# Patient Record
Sex: FEMALE | Race: WHITE | HISPANIC OR LATINO
[De-identification: names, ages, dates, MRNs, and addresses within clinical notes are randomized per-mention and may not be internally consistent; named-entity substitution may affect disease eponyms.]

---

## 2017-05-30 ENCOUNTER — NON-APPOINTMENT (OUTPATIENT)
Age: 62
End: 2017-05-30

## 2017-05-30 ENCOUNTER — APPOINTMENT (OUTPATIENT)
Dept: CARDIOLOGY | Facility: CLINIC | Age: 62
End: 2017-05-30

## 2017-05-30 VITALS
HEIGHT: 62 IN | SYSTOLIC BLOOD PRESSURE: 96 MMHG | BODY MASS INDEX: 44.53 KG/M2 | WEIGHT: 242 LBS | OXYGEN SATURATION: 95 % | HEART RATE: 80 BPM | DIASTOLIC BLOOD PRESSURE: 64 MMHG

## 2017-05-30 VITALS — DIASTOLIC BLOOD PRESSURE: 82 MMHG | SYSTOLIC BLOOD PRESSURE: 134 MMHG

## 2017-09-08 ENCOUNTER — INPATIENT (INPATIENT)
Facility: HOSPITAL | Age: 62
LOS: 16 days | Discharge: HOME | End: 2017-09-25
Attending: PSYCHIATRY & NEUROLOGY | Admitting: PSYCHIATRY & NEUROLOGY
Payer: COMMERCIAL

## 2017-09-08 ENCOUNTER — EMERGENCY (EMERGENCY)
Facility: HOSPITAL | Age: 62
LOS: 0 days | Discharge: PSYCHIATRIC OR SUBSTANCE USE FACILITY | End: 2017-09-08
Attending: EMERGENCY MEDICINE | Admitting: EMERGENCY MEDICINE
Payer: COMMERCIAL

## 2017-09-08 VITALS
HEART RATE: 81 BPM | DIASTOLIC BLOOD PRESSURE: 51 MMHG | SYSTOLIC BLOOD PRESSURE: 124 MMHG | OXYGEN SATURATION: 95 % | RESPIRATION RATE: 19 BRPM | TEMPERATURE: 98 F

## 2017-09-08 VITALS — HEIGHT: 62 IN | RESPIRATION RATE: 20 BRPM | WEIGHT: 212.97 LBS | TEMPERATURE: 97 F

## 2017-09-08 VITALS — HEIGHT: 62 IN | WEIGHT: 235.89 LBS

## 2017-09-08 DIAGNOSIS — F31.4 BIPOLAR DISORDER, CURRENT EPISODE DEPRESSED, SEVERE, WITHOUT PSYCHOTIC FEATURES: ICD-10-CM

## 2017-09-08 DIAGNOSIS — E78.5 HYPERLIPIDEMIA, UNSPECIFIED: ICD-10-CM

## 2017-09-08 DIAGNOSIS — I47.1 SUPRAVENTRICULAR TACHYCARDIA: ICD-10-CM

## 2017-09-08 DIAGNOSIS — F32.2 MAJOR DEPRESSIVE DISORDER, SINGLE EPISODE, SEVERE WITHOUT PSYCHOTIC FEATURES: ICD-10-CM

## 2017-09-08 DIAGNOSIS — R69 ILLNESS, UNSPECIFIED: ICD-10-CM

## 2017-09-08 DIAGNOSIS — E11.9 TYPE 2 DIABETES MELLITUS WITHOUT COMPLICATIONS: ICD-10-CM

## 2017-09-08 LAB
ADD ON TEST-SPECIMEN IN LAB: SIGNIFICANT CHANGE UP
ALBUMIN SERPL ELPH-MCNC: 4.2 G/DL — SIGNIFICANT CHANGE UP (ref 3.3–5)
ALP SERPL-CCNC: 114 U/L — SIGNIFICANT CHANGE UP (ref 40–120)
ALT FLD-CCNC: 30 U/L — SIGNIFICANT CHANGE UP (ref 12–78)
AMPHET UR-MCNC: NEGATIVE — SIGNIFICANT CHANGE UP
ANION GAP SERPL CALC-SCNC: 8 MMOL/L — SIGNIFICANT CHANGE UP (ref 5–17)
APAP SERPL-MCNC: <2 UG/ML — SIGNIFICANT CHANGE UP (ref 10–30)
AST SERPL-CCNC: 14 U/L — LOW (ref 15–37)
BARBITURATES UR SCN-MCNC: NEGATIVE — SIGNIFICANT CHANGE UP
BENZODIAZ UR-MCNC: POSITIVE — SIGNIFICANT CHANGE UP
BILIRUB SERPL-MCNC: 0.6 MG/DL — SIGNIFICANT CHANGE UP (ref 0.2–1.2)
BUN SERPL-MCNC: 11 MG/DL — SIGNIFICANT CHANGE UP (ref 7–23)
CALCIUM SERPL-MCNC: 10.7 MG/DL — HIGH (ref 8.5–10.1)
CHLORIDE SERPL-SCNC: 102 MMOL/L — SIGNIFICANT CHANGE UP (ref 96–108)
CO2 SERPL-SCNC: 27 MMOL/L — SIGNIFICANT CHANGE UP (ref 22–31)
COCAINE METAB.OTHER UR-MCNC: NEGATIVE — SIGNIFICANT CHANGE UP
CREAT SERPL-MCNC: 0.84 MG/DL — SIGNIFICANT CHANGE UP (ref 0.5–1.3)
ETHANOL SERPL-MCNC: <10 MG/DL — SIGNIFICANT CHANGE UP (ref 0–10)
GLUCOSE SERPL-MCNC: 125 MG/DL — HIGH (ref 70–99)
HCT VFR BLD CALC: 45.7 % — HIGH (ref 34.5–45)
HGB BLD-MCNC: 15.7 G/DL — HIGH (ref 11.5–15.5)
MCHC RBC-ENTMCNC: 29.7 PG — SIGNIFICANT CHANGE UP (ref 27–34)
MCHC RBC-ENTMCNC: 34.4 GM/DL — SIGNIFICANT CHANGE UP (ref 32–36)
MCV RBC AUTO: 86.4 FL — SIGNIFICANT CHANGE UP (ref 80–100)
METHADONE UR-MCNC: NEGATIVE — SIGNIFICANT CHANGE UP
OPIATES UR-MCNC: NEGATIVE — SIGNIFICANT CHANGE UP
PCP SPEC-MCNC: SIGNIFICANT CHANGE UP
PCP UR-MCNC: NEGATIVE — SIGNIFICANT CHANGE UP
PLATELET # BLD AUTO: 302 K/UL — SIGNIFICANT CHANGE UP (ref 150–400)
POTASSIUM SERPL-MCNC: 4 MMOL/L — SIGNIFICANT CHANGE UP (ref 3.5–5.3)
POTASSIUM SERPL-SCNC: 4 MMOL/L — SIGNIFICANT CHANGE UP (ref 3.5–5.3)
PROT SERPL-MCNC: 7.8 GM/DL — SIGNIFICANT CHANGE UP (ref 6–8.3)
RBC # BLD: 5.28 M/UL — HIGH (ref 3.8–5.2)
RBC # FLD: 12.5 % — SIGNIFICANT CHANGE UP (ref 10.3–14.5)
SALICYLATES SERPL-MCNC: <1.7 MG/DL — LOW (ref 2.8–20)
SODIUM SERPL-SCNC: 137 MMOL/L — SIGNIFICANT CHANGE UP (ref 135–145)
THC UR QL: NEGATIVE — SIGNIFICANT CHANGE UP
WBC # BLD: 8.2 K/UL — SIGNIFICANT CHANGE UP (ref 3.8–10.5)
WBC # FLD AUTO: 8.2 K/UL — SIGNIFICANT CHANGE UP (ref 3.8–10.5)

## 2017-09-08 PROCEDURE — 99223 1ST HOSP IP/OBS HIGH 75: CPT

## 2017-09-08 PROCEDURE — 99285 EMERGENCY DEPT VISIT HI MDM: CPT

## 2017-09-08 PROCEDURE — 93010 ELECTROCARDIOGRAM REPORT: CPT

## 2017-09-08 RX ORDER — ATENOLOL 25 MG/1
25 TABLET ORAL DAILY
Qty: 0 | Refills: 0 | Status: DISCONTINUED | OUTPATIENT
Start: 2017-09-08 | End: 2017-09-25

## 2017-09-08 RX ORDER — MIRTAZAPINE 45 MG/1
7.5 TABLET, ORALLY DISINTEGRATING ORAL AT BEDTIME
Qty: 0 | Refills: 0 | Status: DISCONTINUED | OUTPATIENT
Start: 2017-09-08 | End: 2017-09-11

## 2017-09-08 RX ORDER — PREGABALIN 225 MG/1
500 CAPSULE ORAL DAILY
Qty: 0 | Refills: 0 | Status: DISCONTINUED | OUTPATIENT
Start: 2017-09-08 | End: 2017-09-25

## 2017-09-08 RX ORDER — ATENOLOL 25 MG/1
25 TABLET ORAL AT BEDTIME
Qty: 0 | Refills: 0 | Status: DISCONTINUED | OUTPATIENT
Start: 2017-09-08 | End: 2017-09-08

## 2017-09-08 RX ORDER — HALOPERIDOL DECANOATE 100 MG/ML
5 INJECTION INTRAMUSCULAR EVERY 6 HOURS
Qty: 0 | Refills: 0 | Status: DISCONTINUED | OUTPATIENT
Start: 2017-09-08 | End: 2017-09-25

## 2017-09-08 RX ORDER — DIPHENHYDRAMINE HCL 50 MG
50 CAPSULE ORAL EVERY 6 HOURS
Qty: 0 | Refills: 0 | Status: DISCONTINUED | OUTPATIENT
Start: 2017-09-08 | End: 2017-09-25

## 2017-09-08 RX ORDER — HYDROXYZINE HCL 10 MG
50 TABLET ORAL EVERY 6 HOURS
Qty: 0 | Refills: 0 | Status: DISCONTINUED | OUTPATIENT
Start: 2017-09-08 | End: 2017-09-25

## 2017-09-08 RX ORDER — DULOXETINE HYDROCHLORIDE 30 MG/1
60 CAPSULE, DELAYED RELEASE ORAL AT BEDTIME
Qty: 0 | Refills: 0 | Status: DISCONTINUED | OUTPATIENT
Start: 2017-09-08 | End: 2017-09-11

## 2017-09-08 RX ORDER — METFORMIN HYDROCHLORIDE 850 MG/1
500 TABLET ORAL
Qty: 0 | Refills: 0 | Status: DISCONTINUED | OUTPATIENT
Start: 2017-09-08 | End: 2017-09-25

## 2017-09-08 RX ORDER — LAMOTRIGINE 25 MG/1
200 TABLET, ORALLY DISINTEGRATING ORAL AT BEDTIME
Qty: 0 | Refills: 0 | Status: DISCONTINUED | OUTPATIENT
Start: 2017-09-08 | End: 2017-09-25

## 2017-09-08 RX ORDER — ALPRAZOLAM 0.25 MG
0.25 TABLET ORAL AT BEDTIME
Qty: 0 | Refills: 0 | Status: DISCONTINUED | OUTPATIENT
Start: 2017-09-08 | End: 2017-09-11

## 2017-09-08 RX ORDER — ATORVASTATIN CALCIUM 80 MG/1
10 TABLET, FILM COATED ORAL AT BEDTIME
Qty: 0 | Refills: 0 | Status: DISCONTINUED | OUTPATIENT
Start: 2017-09-08 | End: 2017-09-25

## 2017-09-08 RX ADMIN — DULOXETINE HYDROCHLORIDE 60 MILLIGRAM(S): 30 CAPSULE, DELAYED RELEASE ORAL at 21:58

## 2017-09-08 RX ADMIN — LAMOTRIGINE 200 MILLIGRAM(S): 25 TABLET, ORALLY DISINTEGRATING ORAL at 21:58

## 2017-09-08 RX ADMIN — MIRTAZAPINE 7.5 MILLIGRAM(S): 45 TABLET, ORALLY DISINTEGRATING ORAL at 21:58

## 2017-09-08 RX ADMIN — METFORMIN HYDROCHLORIDE 500 MILLIGRAM(S): 850 TABLET ORAL at 21:58

## 2017-09-08 RX ADMIN — ATORVASTATIN CALCIUM 10 MILLIGRAM(S): 80 TABLET, FILM COATED ORAL at 21:58

## 2017-09-08 RX ADMIN — Medication 30 MILLIGRAM(S): at 21:58

## 2017-09-08 NOTE — ED PROVIDER NOTE - PSH
Benign Tumor of Breast  right  Fallen Bladder  Bladder Sling  Hernia  Right Inguinal  repair  History of Arthroscopy of Knee  Right  S/P Dilation and Curettage

## 2017-09-08 NOTE — ED ADULT TRIAGE NOTE - CHIEF COMPLAINT QUOTE
Patient comes to ED for SI and paranoia. Pt states she has  been having fears pf work, computers and going to store. Pt states she took xanax last night and this morning. pt states her fear was bad thru the night when she had thoughts of taking 45 xanax to harm herself. Pt was sent to psychiatrist office.

## 2017-09-08 NOTE — ED BEHAVIORAL HEALTH ASSESSMENT NOTE - HPI (INCLUDE ILLNESS QUALITY, SEVERITY, DURATION, TIMING, CONTEXT, MODIFYING FACTORS, ASSOCIATED SIGNS AND SYMPTOMS)
61 yodwf, employed as  for [employer], , lives alone in [place], [facility] Bipolar disorder, Catatonia, past h/o raul, one prior psych admission at  2013 for Catatonia, no h/o SA or SI, no h/o cutting, violence, subsdtance use/abuse, PMH Mitral Regurgitation, SVT, Palpitations, PRESTON, NIDDM, HLD, recent abnormal parathyroid, bib sister from employers parking lot after calling in sick, unable to go into work, due to overwhelming anxiety and fear.    Pt reports increasing depression over past 4 weeks, in treatment at [facility] with Dr Lizette Arredondo, who has been adjusting meds and changed to bedtime.  Current c/o s include severe depression, impaired sleep, using Xanax to sleep, cannot stay asleep, last night took 2, 0.25 mg to fall asleep and 3 more over night.  Recent h/o SI, last night had thoughts of taking entire bottle of Xanax 60 tabs dispensed yesterday,, 45 elsie,  but stopped due to fear of consequence of failed attempt, "being a vegetable", admits to past sporadic SI, thoughts of driving into a wall, but denies prior attempts or SI being part of prior depression.  Pt has h/o raul but never admitted to hospital for raul, with h/o $5000 debt, recently paid off.  Last psych admission at  for Catatonia, when diagnosed Bipolar.  Pt had been in treatment for approx 10 yrs prior to  admission, with  Dr Flores for Depressive Disorder.  Pt reports weight loss, and fear irrational fear of leaving home, or  to get food, occasionally can go to drive through.  Pt  went for DNA testing this past Tues ordered by Psychiatrist due to refractory depression and prior failed meds.  Pt had been on Geodon in past with side effect diaphoresis, and Seroquel caused "addiction", she would obsess about taking it due to sedation and desire to sleep. 61 yodwf, employed as  for [employer], , lives alone in [place], [facility] Bipolar disorder, Catatonia, past h/o raul, one prior psych admission at  2013 for Catatonia, no h/o SA or SI, no h/o cutting, violence, subsdtance use/abuse, PMH Mitral Regurgitation, SVT, Palpitations, PRESTON, NIDDM, HLD, recent abnormal parathyroid, bib sister from employers parking lot after calling in sick, unable to go into work, due to overwhelming anxiety and fear.    Pt reports increasing depression over past 4 weeks, in treatment at [facility] with Dr Lizette Arredondo, who has been adjusting meds and changed to bedtime.  Current c/o s include severe depression, impaired sleep, using Xanax to sleep, cannot stay asleep, last night took 2, 0.25 mg to fall asleep and 3 more over night.  Recent h/o SI, last night had thoughts of taking entire bottle of Xanax 60 tabs dispensed yesterday,, 45 elsie,  but stopped due to fear of consequence of failed attempt, "being a vegetable", admits to past sporadic SI, thoughts of driving into a wall, but denies prior attempts or SI being part of prior depression.  Pt has h/o raul but never admitted to hospital for raul, with h/o $5000 debt, recently paid off.  Last psych admission at  for Catatonia, when diagnosed Bipolar.  Pt had been in treatment for approx 10 yrs prior to  admission, with  Dr Flores for Depressive Disorder.  Pt reports weight loss, and  irrational fear of leaving home, or  to get food, occasionally can go to drive through.  Pt  went for DNA testing this past Tues ordered by Psychiatrist due to refractory depression and prior failed meds.  Pt had been on Geodon in past with side effect of  diaphoresis, and Seroquel caused "addiction", she would obsess about taking it due to sedation and desire to sleep.

## 2017-09-08 NOTE — ED BEHAVIORAL HEALTH ASSESSMENT NOTE - SUMMARY
61 yodwf, PPH Depression, Bipolar,  Catatonia and Claire with one prior psych admission for Catatonia in 2013,  no h/o SA, presents to ED with severe depression, SI, with pan and questionably intent, insomnia weight loss  debilitating anxiety with impaired functioning.  Pt is a potential danger to self and requires in Pt psych admission for mood stabilization, adjustment of meds and safety.  Case reviewed with Dr Patton.  No beds available on 5N today and none at [facility].  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  Will discuss with Pt regarding [facility] vs [place] per abed availability.  Pt wants to say in sytem

## 2017-09-08 NOTE — ED BEHAVIORAL HEALTH ASSESSMENT NOTE - CURRENT MEDICATION
Cymbalta 60 mg qd, Lamictal 100 mg bid, Buspar 30 mg bid, Xanax 0.25 mg bid prn (uses for sleep)  Metformin 500mg qd, Cardizem and Atenolol for SVT, dose unk, B12, [place] col for HLD.

## 2017-09-08 NOTE — ED ADULT NURSE REASSESSMENT NOTE - NS ED NURSE REASSESS COMMENT FT1
Peter Torres NP is at the bedside evaluating patient. Will continue to monitor for further evaluation and treatment

## 2017-09-08 NOTE — ED BEHAVIORAL HEALTH ASSESSMENT NOTE - OTHER PAST PSYCHIATRIC HISTORY (INCLUDE DETAILS REGARDING ONSET, COURSE OF ILLNESS, INPATIENT/OUTPATIENT TREATMENT)
one prior psych admission [facility] 2013 Catatonia  h/o Claire, not hospitalized.  Current Psych MS Dr Lizette Arredondo [facility] Guidance  Past MD Dr Flores  no h/o self harm

## 2017-09-08 NOTE — ED BEHAVIORAL HEALTH ASSESSMENT NOTE - SUICIDE RISK FACTORS
Global insomnia/Anhedonia/Hopelessness/Mood episode/Agitation/severe anxiety/Access to means (pills, firearms, etc.)

## 2017-09-08 NOTE — ED PROVIDER NOTE - PMH
Anxiety    Hypercholesteremia    IBS (Irritable Bowel Syndrome)    Mild Depression    Mitral Valve Prolapse    PRESTON (Obstructive Sleep Apnea)  Diagnosed, no Machine  Sustained Supraventricular Tachycardia

## 2017-09-08 NOTE — ED BEHAVIORAL HEALTH ASSESSMENT NOTE - PATIENT'S CHIEF COMPLAINT
"I am not myself. ..I had thoughts of taking the whole bottle of Xanax and of driving my car into a wall".

## 2017-09-08 NOTE — ED PROVIDER NOTE - OBJECTIVE STATEMENT
62 y/o F with a PMHx of bipolar disorder, SVT on Cardizem, DM, HLD presents to the ED c/o worsening anxiety, depression with SI. Pt states that she takes Xanax at night to help her sleep and thought about taking x45 Xanax last night. Pt states that she has an empty feeling inside, not experiencing any pain, currently calm, able to provide adequate hx and denies any other acute c/o at this time. PMD Bob.

## 2017-09-08 NOTE — ED BEHAVIORAL HEALTH ASSESSMENT NOTE - RISK ASSESSMENT
Pt is mod risk of self harm due to new onset SI with plan and pos intent, h/o severe depression with Catatonia and prolonged depression due to refractory depression on meds.

## 2017-09-08 NOTE — ED ADULT NURSE NOTE - OBJECTIVE STATEMENT
60 y/o F presents to the ED c/o SI and paranoia. Pt states she has lost interest in things and just "sits in front of the TV." She also states she is fearful of going to work, her computer and going to the grocery store. Pt takes xanax for anxiety and states she had a plan to take "45 pills but decided not to."

## 2017-09-08 NOTE — ED BEHAVIORAL HEALTH ASSESSMENT NOTE - DESCRIPTION
Mood Depressed, anxious affect flat, delayed slowed speech, endorsed SI with plans to OD or drive car into wall.  Intent unclear, fears becoming "vegetable", no evidence of a thought disorder, denies AH/VH/OH/Delusions.  Oriented fully. NIDDM, HLD, SVT, PRESTON, Mitral valve regurg, Palpitaion, parathyroid divorded x 2, 2 adult children with first , currently dating 2nd , lives alone, works full time for [employer] as  and worked 30 yrs at [email or web address]

## 2017-09-09 LAB
ALBUMIN SERPL ELPH-MCNC: 4.7 G/DL — SIGNIFICANT CHANGE UP (ref 3.3–5)
ALP SERPL-CCNC: 107 U/L — SIGNIFICANT CHANGE UP (ref 40–120)
ALT FLD-CCNC: 22 U/L — SIGNIFICANT CHANGE UP (ref 4–33)
AMPHET UR-MCNC: NEGATIVE — SIGNIFICANT CHANGE UP
APPEARANCE UR: SIGNIFICANT CHANGE UP
AST SERPL-CCNC: 14 U/L — SIGNIFICANT CHANGE UP (ref 4–32)
BARBITURATES UR SCN-MCNC: NEGATIVE — SIGNIFICANT CHANGE UP
BASOPHILS # BLD AUTO: 0.04 K/UL — SIGNIFICANT CHANGE UP (ref 0–0.2)
BASOPHILS NFR BLD AUTO: 0.5 % — SIGNIFICANT CHANGE UP (ref 0–2)
BENZODIAZ UR-MCNC: NEGATIVE — SIGNIFICANT CHANGE UP
BILIRUB SERPL-MCNC: 0.6 MG/DL — SIGNIFICANT CHANGE UP (ref 0.2–1.2)
BILIRUB UR-MCNC: NEGATIVE — SIGNIFICANT CHANGE UP
BLOOD UR QL VISUAL: NEGATIVE — SIGNIFICANT CHANGE UP
BUN SERPL-MCNC: 12 MG/DL — SIGNIFICANT CHANGE UP (ref 7–23)
CALCIUM SERPL-MCNC: 10.8 MG/DL — HIGH (ref 8.4–10.5)
CANNABINOIDS UR-MCNC: NEGATIVE — SIGNIFICANT CHANGE UP
CHLORIDE SERPL-SCNC: 97 MMOL/L — LOW (ref 98–107)
CHOLEST SERPL-MCNC: 194 MG/DL — SIGNIFICANT CHANGE UP (ref 120–199)
CO2 SERPL-SCNC: 28 MMOL/L — SIGNIFICANT CHANGE UP (ref 22–31)
COCAINE METAB.OTHER UR-MCNC: NEGATIVE — SIGNIFICANT CHANGE UP
COD CRY URNS QL: SIGNIFICANT CHANGE UP (ref 0–0)
COLOR SPEC: YELLOW — SIGNIFICANT CHANGE UP
CREAT SERPL-MCNC: 1.06 MG/DL — SIGNIFICANT CHANGE UP (ref 0.5–1.3)
EOSINOPHIL # BLD AUTO: 0.12 K/UL — SIGNIFICANT CHANGE UP (ref 0–0.5)
EOSINOPHIL NFR BLD AUTO: 1.6 % — SIGNIFICANT CHANGE UP (ref 0–6)
GLUCOSE SERPL-MCNC: 242 MG/DL — HIGH (ref 70–99)
GLUCOSE UR-MCNC: NEGATIVE — SIGNIFICANT CHANGE UP
HBA1C BLD-MCNC: 6.8 % — HIGH (ref 4–5.6)
HCT VFR BLD CALC: 48.6 % — HIGH (ref 34.5–45)
HDLC SERPL-MCNC: 55 MG/DL — SIGNIFICANT CHANGE UP (ref 45–65)
HGB BLD-MCNC: 15.4 G/DL — SIGNIFICANT CHANGE UP (ref 11.5–15.5)
IMM GRANULOCYTES # BLD AUTO: 0.02 # — SIGNIFICANT CHANGE UP
IMM GRANULOCYTES NFR BLD AUTO: 0.3 % — SIGNIFICANT CHANGE UP (ref 0–1.5)
KETONES UR-MCNC: NEGATIVE — SIGNIFICANT CHANGE UP
LEUKOCYTE ESTERASE UR-ACNC: HIGH
LIPID PNL WITH DIRECT LDL SERPL: 127 MG/DL — SIGNIFICANT CHANGE UP
LYMPHOCYTES # BLD AUTO: 2.11 K/UL — SIGNIFICANT CHANGE UP (ref 1–3.3)
LYMPHOCYTES # BLD AUTO: 28.6 % — SIGNIFICANT CHANGE UP (ref 13–44)
MCHC RBC-ENTMCNC: 28.5 PG — SIGNIFICANT CHANGE UP (ref 27–34)
MCHC RBC-ENTMCNC: 31.7 % — LOW (ref 32–36)
MCV RBC AUTO: 90 FL — SIGNIFICANT CHANGE UP (ref 80–100)
METHADONE UR-MCNC: NEGATIVE — SIGNIFICANT CHANGE UP
MONOCYTES # BLD AUTO: 0.49 K/UL — SIGNIFICANT CHANGE UP (ref 0–0.9)
MONOCYTES NFR BLD AUTO: 6.6 % — SIGNIFICANT CHANGE UP (ref 2–14)
MUCOUS THREADS # UR AUTO: SIGNIFICANT CHANGE UP
NEUTROPHILS # BLD AUTO: 4.61 K/UL — SIGNIFICANT CHANGE UP (ref 1.8–7.4)
NEUTROPHILS NFR BLD AUTO: 62.4 % — SIGNIFICANT CHANGE UP (ref 43–77)
NITRITE UR-MCNC: NEGATIVE — SIGNIFICANT CHANGE UP
NRBC # FLD: 0 — SIGNIFICANT CHANGE UP
OPIATES UR-MCNC: NEGATIVE — SIGNIFICANT CHANGE UP
OXYCODONE UR-MCNC: NEGATIVE — SIGNIFICANT CHANGE UP
PCP UR-MCNC: NEGATIVE — SIGNIFICANT CHANGE UP
PH UR: 6 — SIGNIFICANT CHANGE UP (ref 4.6–8)
PLATELET # BLD AUTO: 309 K/UL — SIGNIFICANT CHANGE UP (ref 150–400)
PMV BLD: 11 FL — SIGNIFICANT CHANGE UP (ref 7–13)
POTASSIUM SERPL-MCNC: 4.7 MMOL/L — SIGNIFICANT CHANGE UP (ref 3.5–5.3)
POTASSIUM SERPL-SCNC: 4.7 MMOL/L — SIGNIFICANT CHANGE UP (ref 3.5–5.3)
PROT SERPL-MCNC: 7.8 G/DL — SIGNIFICANT CHANGE UP (ref 6–8.3)
PROT UR-MCNC: 20 — SIGNIFICANT CHANGE UP
RBC # BLD: 5.4 M/UL — HIGH (ref 3.8–5.2)
RBC # FLD: 13.1 % — SIGNIFICANT CHANGE UP (ref 10.3–14.5)
RBC CASTS # UR COMP ASSIST: SIGNIFICANT CHANGE UP (ref 0–?)
SODIUM SERPL-SCNC: 141 MMOL/L — SIGNIFICANT CHANGE UP (ref 135–145)
SP GR SPEC: 1.02 — SIGNIFICANT CHANGE UP (ref 1–1.03)
SQUAMOUS # UR AUTO: SIGNIFICANT CHANGE UP
TRIGL SERPL-MCNC: 155 MG/DL — HIGH (ref 10–149)
TSH SERPL-MCNC: 3.38 UIU/ML — SIGNIFICANT CHANGE UP (ref 0.27–4.2)
UROBILINOGEN FLD QL: NORMAL E.U. — SIGNIFICANT CHANGE UP (ref 0.1–0.2)
WBC # BLD: 7.39 K/UL — SIGNIFICANT CHANGE UP (ref 3.8–10.5)
WBC # FLD AUTO: 7.39 K/UL — SIGNIFICANT CHANGE UP (ref 3.8–10.5)
WBC UR QL: HIGH (ref 0–?)

## 2017-09-09 RX ADMIN — ATORVASTATIN CALCIUM 10 MILLIGRAM(S): 80 TABLET, FILM COATED ORAL at 21:00

## 2017-09-09 RX ADMIN — METFORMIN HYDROCHLORIDE 500 MILLIGRAM(S): 850 TABLET ORAL at 16:46

## 2017-09-09 RX ADMIN — LAMOTRIGINE 200 MILLIGRAM(S): 25 TABLET, ORALLY DISINTEGRATING ORAL at 21:00

## 2017-09-09 RX ADMIN — ATENOLOL 25 MILLIGRAM(S): 25 TABLET ORAL at 08:45

## 2017-09-09 RX ADMIN — PREGABALIN 500 MICROGRAM(S): 225 CAPSULE ORAL at 08:45

## 2017-09-09 RX ADMIN — Medication 30 MILLIGRAM(S): at 21:00

## 2017-09-09 RX ADMIN — MIRTAZAPINE 7.5 MILLIGRAM(S): 45 TABLET, ORALLY DISINTEGRATING ORAL at 21:00

## 2017-09-09 RX ADMIN — DULOXETINE HYDROCHLORIDE 60 MILLIGRAM(S): 30 CAPSULE, DELAYED RELEASE ORAL at 21:00

## 2017-09-10 RX ADMIN — ATENOLOL 25 MILLIGRAM(S): 25 TABLET ORAL at 08:49

## 2017-09-10 RX ADMIN — Medication 30 MILLIGRAM(S): at 21:01

## 2017-09-10 RX ADMIN — LAMOTRIGINE 200 MILLIGRAM(S): 25 TABLET, ORALLY DISINTEGRATING ORAL at 21:01

## 2017-09-10 RX ADMIN — ATORVASTATIN CALCIUM 10 MILLIGRAM(S): 80 TABLET, FILM COATED ORAL at 21:01

## 2017-09-10 RX ADMIN — METFORMIN HYDROCHLORIDE 500 MILLIGRAM(S): 850 TABLET ORAL at 16:48

## 2017-09-10 RX ADMIN — PREGABALIN 500 MICROGRAM(S): 225 CAPSULE ORAL at 08:50

## 2017-09-10 RX ADMIN — MIRTAZAPINE 7.5 MILLIGRAM(S): 45 TABLET, ORALLY DISINTEGRATING ORAL at 21:01

## 2017-09-10 RX ADMIN — DULOXETINE HYDROCHLORIDE 60 MILLIGRAM(S): 30 CAPSULE, DELAYED RELEASE ORAL at 21:01

## 2017-09-11 PROCEDURE — 99232 SBSQ HOSP IP/OBS MODERATE 35: CPT | Mod: GC

## 2017-09-11 RX ORDER — GABAPENTIN 400 MG/1
100 CAPSULE ORAL THREE TIMES A DAY
Qty: 0 | Refills: 0 | Status: DISCONTINUED | OUTPATIENT
Start: 2017-09-11 | End: 2017-09-18

## 2017-09-11 RX ORDER — DULOXETINE HYDROCHLORIDE 30 MG/1
40 CAPSULE, DELAYED RELEASE ORAL DAILY
Qty: 0 | Refills: 0 | Status: DISCONTINUED | OUTPATIENT
Start: 2017-09-11 | End: 2017-09-14

## 2017-09-11 RX ORDER — MIRTAZAPINE 45 MG/1
15 TABLET, ORALLY DISINTEGRATING ORAL AT BEDTIME
Qty: 0 | Refills: 0 | Status: DISCONTINUED | OUTPATIENT
Start: 2017-09-11 | End: 2017-09-14

## 2017-09-11 RX ADMIN — ATENOLOL 25 MILLIGRAM(S): 25 TABLET ORAL at 08:30

## 2017-09-11 RX ADMIN — GABAPENTIN 100 MILLIGRAM(S): 400 CAPSULE ORAL at 21:06

## 2017-09-11 RX ADMIN — MIRTAZAPINE 15 MILLIGRAM(S): 45 TABLET, ORALLY DISINTEGRATING ORAL at 21:07

## 2017-09-11 RX ADMIN — LAMOTRIGINE 200 MILLIGRAM(S): 25 TABLET, ORALLY DISINTEGRATING ORAL at 21:07

## 2017-09-11 RX ADMIN — METFORMIN HYDROCHLORIDE 500 MILLIGRAM(S): 850 TABLET ORAL at 16:14

## 2017-09-11 RX ADMIN — Medication 30 MILLIGRAM(S): at 21:06

## 2017-09-11 RX ADMIN — ATORVASTATIN CALCIUM 10 MILLIGRAM(S): 80 TABLET, FILM COATED ORAL at 21:06

## 2017-09-11 RX ADMIN — PREGABALIN 500 MICROGRAM(S): 225 CAPSULE ORAL at 08:30

## 2017-09-12 PROCEDURE — 99232 SBSQ HOSP IP/OBS MODERATE 35: CPT | Mod: GC

## 2017-09-12 RX ADMIN — GABAPENTIN 100 MILLIGRAM(S): 400 CAPSULE ORAL at 08:06

## 2017-09-12 RX ADMIN — GABAPENTIN 100 MILLIGRAM(S): 400 CAPSULE ORAL at 14:10

## 2017-09-12 RX ADMIN — DULOXETINE HYDROCHLORIDE 40 MILLIGRAM(S): 30 CAPSULE, DELAYED RELEASE ORAL at 08:06

## 2017-09-12 RX ADMIN — METFORMIN HYDROCHLORIDE 500 MILLIGRAM(S): 850 TABLET ORAL at 17:06

## 2017-09-12 RX ADMIN — MIRTAZAPINE 15 MILLIGRAM(S): 45 TABLET, ORALLY DISINTEGRATING ORAL at 20:56

## 2017-09-12 RX ADMIN — PREGABALIN 500 MICROGRAM(S): 225 CAPSULE ORAL at 08:06

## 2017-09-12 RX ADMIN — LAMOTRIGINE 200 MILLIGRAM(S): 25 TABLET, ORALLY DISINTEGRATING ORAL at 20:55

## 2017-09-12 RX ADMIN — ATORVASTATIN CALCIUM 10 MILLIGRAM(S): 80 TABLET, FILM COATED ORAL at 20:56

## 2017-09-12 RX ADMIN — ATENOLOL 25 MILLIGRAM(S): 25 TABLET ORAL at 08:06

## 2017-09-12 RX ADMIN — GABAPENTIN 100 MILLIGRAM(S): 400 CAPSULE ORAL at 20:55

## 2017-09-12 RX ADMIN — Medication 15 MILLIGRAM(S): at 20:56

## 2017-09-13 PROCEDURE — 90853 GROUP PSYCHOTHERAPY: CPT

## 2017-09-13 PROCEDURE — 99232 SBSQ HOSP IP/OBS MODERATE 35: CPT | Mod: 25,GC

## 2017-09-13 RX ADMIN — ATORVASTATIN CALCIUM 10 MILLIGRAM(S): 80 TABLET, FILM COATED ORAL at 20:34

## 2017-09-13 RX ADMIN — GABAPENTIN 100 MILLIGRAM(S): 400 CAPSULE ORAL at 20:34

## 2017-09-13 RX ADMIN — GABAPENTIN 100 MILLIGRAM(S): 400 CAPSULE ORAL at 08:46

## 2017-09-13 RX ADMIN — LAMOTRIGINE 200 MILLIGRAM(S): 25 TABLET, ORALLY DISINTEGRATING ORAL at 20:34

## 2017-09-13 RX ADMIN — ATENOLOL 25 MILLIGRAM(S): 25 TABLET ORAL at 08:46

## 2017-09-13 RX ADMIN — PREGABALIN 500 MICROGRAM(S): 225 CAPSULE ORAL at 08:46

## 2017-09-13 RX ADMIN — Medication 15 MILLIGRAM(S): at 20:34

## 2017-09-13 RX ADMIN — METFORMIN HYDROCHLORIDE 500 MILLIGRAM(S): 850 TABLET ORAL at 17:02

## 2017-09-13 RX ADMIN — MIRTAZAPINE 15 MILLIGRAM(S): 45 TABLET, ORALLY DISINTEGRATING ORAL at 20:34

## 2017-09-13 RX ADMIN — DULOXETINE HYDROCHLORIDE 40 MILLIGRAM(S): 30 CAPSULE, DELAYED RELEASE ORAL at 08:46

## 2017-09-13 RX ADMIN — GABAPENTIN 100 MILLIGRAM(S): 400 CAPSULE ORAL at 12:46

## 2017-09-14 PROCEDURE — 99232 SBSQ HOSP IP/OBS MODERATE 35: CPT | Mod: GC

## 2017-09-14 RX ORDER — DULOXETINE HYDROCHLORIDE 30 MG/1
20 CAPSULE, DELAYED RELEASE ORAL DAILY
Qty: 0 | Refills: 0 | Status: DISCONTINUED | OUTPATIENT
Start: 2017-09-14 | End: 2017-09-19

## 2017-09-14 RX ORDER — MIRTAZAPINE 45 MG/1
30 TABLET, ORALLY DISINTEGRATING ORAL AT BEDTIME
Qty: 0 | Refills: 0 | Status: DISCONTINUED | OUTPATIENT
Start: 2017-09-14 | End: 2017-09-21

## 2017-09-14 RX ADMIN — GABAPENTIN 100 MILLIGRAM(S): 400 CAPSULE ORAL at 20:42

## 2017-09-14 RX ADMIN — MIRTAZAPINE 30 MILLIGRAM(S): 45 TABLET, ORALLY DISINTEGRATING ORAL at 20:56

## 2017-09-14 RX ADMIN — LAMOTRIGINE 200 MILLIGRAM(S): 25 TABLET, ORALLY DISINTEGRATING ORAL at 20:42

## 2017-09-14 RX ADMIN — PREGABALIN 500 MICROGRAM(S): 225 CAPSULE ORAL at 09:01

## 2017-09-14 RX ADMIN — ATENOLOL 25 MILLIGRAM(S): 25 TABLET ORAL at 09:01

## 2017-09-14 RX ADMIN — METFORMIN HYDROCHLORIDE 500 MILLIGRAM(S): 850 TABLET ORAL at 17:32

## 2017-09-14 RX ADMIN — GABAPENTIN 100 MILLIGRAM(S): 400 CAPSULE ORAL at 13:58

## 2017-09-14 RX ADMIN — DULOXETINE HYDROCHLORIDE 40 MILLIGRAM(S): 30 CAPSULE, DELAYED RELEASE ORAL at 09:01

## 2017-09-14 RX ADMIN — Medication 15 MILLIGRAM(S): at 20:42

## 2017-09-14 RX ADMIN — ATORVASTATIN CALCIUM 10 MILLIGRAM(S): 80 TABLET, FILM COATED ORAL at 20:42

## 2017-09-14 RX ADMIN — GABAPENTIN 100 MILLIGRAM(S): 400 CAPSULE ORAL at 09:01

## 2017-09-15 PROCEDURE — 99232 SBSQ HOSP IP/OBS MODERATE 35: CPT | Mod: GC

## 2017-09-15 RX ADMIN — GABAPENTIN 100 MILLIGRAM(S): 400 CAPSULE ORAL at 08:50

## 2017-09-15 RX ADMIN — ATENOLOL 25 MILLIGRAM(S): 25 TABLET ORAL at 08:50

## 2017-09-15 RX ADMIN — DULOXETINE HYDROCHLORIDE 20 MILLIGRAM(S): 30 CAPSULE, DELAYED RELEASE ORAL at 08:50

## 2017-09-15 RX ADMIN — METFORMIN HYDROCHLORIDE 500 MILLIGRAM(S): 850 TABLET ORAL at 17:42

## 2017-09-15 RX ADMIN — GABAPENTIN 100 MILLIGRAM(S): 400 CAPSULE ORAL at 13:49

## 2017-09-15 RX ADMIN — MIRTAZAPINE 30 MILLIGRAM(S): 45 TABLET, ORALLY DISINTEGRATING ORAL at 21:17

## 2017-09-15 RX ADMIN — LAMOTRIGINE 200 MILLIGRAM(S): 25 TABLET, ORALLY DISINTEGRATING ORAL at 21:17

## 2017-09-15 RX ADMIN — GABAPENTIN 100 MILLIGRAM(S): 400 CAPSULE ORAL at 21:17

## 2017-09-15 RX ADMIN — ATORVASTATIN CALCIUM 10 MILLIGRAM(S): 80 TABLET, FILM COATED ORAL at 21:16

## 2017-09-15 RX ADMIN — PREGABALIN 500 MICROGRAM(S): 225 CAPSULE ORAL at 08:50

## 2017-09-16 RX ADMIN — ATORVASTATIN CALCIUM 10 MILLIGRAM(S): 80 TABLET, FILM COATED ORAL at 21:31

## 2017-09-16 RX ADMIN — GABAPENTIN 100 MILLIGRAM(S): 400 CAPSULE ORAL at 13:27

## 2017-09-16 RX ADMIN — GABAPENTIN 100 MILLIGRAM(S): 400 CAPSULE ORAL at 08:56

## 2017-09-16 RX ADMIN — MIRTAZAPINE 30 MILLIGRAM(S): 45 TABLET, ORALLY DISINTEGRATING ORAL at 21:31

## 2017-09-16 RX ADMIN — DULOXETINE HYDROCHLORIDE 20 MILLIGRAM(S): 30 CAPSULE, DELAYED RELEASE ORAL at 08:56

## 2017-09-16 RX ADMIN — PREGABALIN 500 MICROGRAM(S): 225 CAPSULE ORAL at 08:56

## 2017-09-16 RX ADMIN — ATENOLOL 25 MILLIGRAM(S): 25 TABLET ORAL at 08:56

## 2017-09-16 RX ADMIN — METFORMIN HYDROCHLORIDE 500 MILLIGRAM(S): 850 TABLET ORAL at 17:07

## 2017-09-16 RX ADMIN — LAMOTRIGINE 200 MILLIGRAM(S): 25 TABLET, ORALLY DISINTEGRATING ORAL at 21:31

## 2017-09-16 RX ADMIN — GABAPENTIN 100 MILLIGRAM(S): 400 CAPSULE ORAL at 21:31

## 2017-09-17 RX ADMIN — MIRTAZAPINE 30 MILLIGRAM(S): 45 TABLET, ORALLY DISINTEGRATING ORAL at 20:34

## 2017-09-17 RX ADMIN — ATORVASTATIN CALCIUM 10 MILLIGRAM(S): 80 TABLET, FILM COATED ORAL at 20:33

## 2017-09-17 RX ADMIN — Medication 50 MILLIGRAM(S): at 21:34

## 2017-09-17 RX ADMIN — ATENOLOL 25 MILLIGRAM(S): 25 TABLET ORAL at 08:50

## 2017-09-17 RX ADMIN — GABAPENTIN 100 MILLIGRAM(S): 400 CAPSULE ORAL at 08:50

## 2017-09-17 RX ADMIN — LAMOTRIGINE 200 MILLIGRAM(S): 25 TABLET, ORALLY DISINTEGRATING ORAL at 20:34

## 2017-09-17 RX ADMIN — GABAPENTIN 100 MILLIGRAM(S): 400 CAPSULE ORAL at 20:33

## 2017-09-17 RX ADMIN — GABAPENTIN 100 MILLIGRAM(S): 400 CAPSULE ORAL at 13:02

## 2017-09-17 RX ADMIN — PREGABALIN 500 MICROGRAM(S): 225 CAPSULE ORAL at 08:50

## 2017-09-17 RX ADMIN — METFORMIN HYDROCHLORIDE 500 MILLIGRAM(S): 850 TABLET ORAL at 16:08

## 2017-09-17 RX ADMIN — DULOXETINE HYDROCHLORIDE 20 MILLIGRAM(S): 30 CAPSULE, DELAYED RELEASE ORAL at 08:50

## 2017-09-18 PROCEDURE — 99232 SBSQ HOSP IP/OBS MODERATE 35: CPT

## 2017-09-18 RX ORDER — GABAPENTIN 400 MG/1
100 CAPSULE ORAL
Qty: 0 | Refills: 0 | Status: DISCONTINUED | OUTPATIENT
Start: 2017-09-18 | End: 2017-09-20

## 2017-09-18 RX ORDER — LANOLIN ALCOHOL/MO/W.PET/CERES
3 CREAM (GRAM) TOPICAL AT BEDTIME
Qty: 0 | Refills: 0 | Status: DISCONTINUED | OUTPATIENT
Start: 2017-09-18 | End: 2017-09-25

## 2017-09-18 RX ORDER — GABAPENTIN 400 MG/1
200 CAPSULE ORAL AT BEDTIME
Qty: 0 | Refills: 0 | Status: DISCONTINUED | OUTPATIENT
Start: 2017-09-18 | End: 2017-09-20

## 2017-09-18 RX ADMIN — DULOXETINE HYDROCHLORIDE 20 MILLIGRAM(S): 30 CAPSULE, DELAYED RELEASE ORAL at 08:29

## 2017-09-18 RX ADMIN — ATENOLOL 25 MILLIGRAM(S): 25 TABLET ORAL at 08:29

## 2017-09-18 RX ADMIN — Medication 3 MILLIGRAM(S): at 21:45

## 2017-09-18 RX ADMIN — GABAPENTIN 200 MILLIGRAM(S): 400 CAPSULE ORAL at 21:43

## 2017-09-18 RX ADMIN — ATORVASTATIN CALCIUM 10 MILLIGRAM(S): 80 TABLET, FILM COATED ORAL at 21:43

## 2017-09-18 RX ADMIN — LAMOTRIGINE 200 MILLIGRAM(S): 25 TABLET, ORALLY DISINTEGRATING ORAL at 21:43

## 2017-09-18 RX ADMIN — GABAPENTIN 100 MILLIGRAM(S): 400 CAPSULE ORAL at 08:29

## 2017-09-18 RX ADMIN — PREGABALIN 500 MICROGRAM(S): 225 CAPSULE ORAL at 08:29

## 2017-09-18 RX ADMIN — METFORMIN HYDROCHLORIDE 500 MILLIGRAM(S): 850 TABLET ORAL at 16:49

## 2017-09-18 RX ADMIN — GABAPENTIN 100 MILLIGRAM(S): 400 CAPSULE ORAL at 12:22

## 2017-09-18 RX ADMIN — MIRTAZAPINE 30 MILLIGRAM(S): 45 TABLET, ORALLY DISINTEGRATING ORAL at 21:43

## 2017-09-19 PROCEDURE — 99232 SBSQ HOSP IP/OBS MODERATE 35: CPT | Mod: GC

## 2017-09-19 RX ORDER — GLUCAGON INJECTION, SOLUTION 0.5 MG/.1ML
1 INJECTION, SOLUTION SUBCUTANEOUS ONCE
Qty: 0 | Refills: 0 | Status: DISCONTINUED | OUTPATIENT
Start: 2017-09-19 | End: 2017-09-25

## 2017-09-19 RX ORDER — DEXTROSE 50 % IN WATER 50 %
1 SYRINGE (ML) INTRAVENOUS ONCE
Qty: 0 | Refills: 0 | Status: DISCONTINUED | OUTPATIENT
Start: 2017-09-19 | End: 2017-09-25

## 2017-09-19 RX ORDER — DEXTROSE 50 % IN WATER 50 %
25 SYRINGE (ML) INTRAVENOUS ONCE
Qty: 0 | Refills: 0 | Status: DISCONTINUED | OUTPATIENT
Start: 2017-09-19 | End: 2017-09-25

## 2017-09-19 RX ORDER — SODIUM CHLORIDE 9 MG/ML
1000 INJECTION, SOLUTION INTRAVENOUS
Qty: 0 | Refills: 0 | Status: DISCONTINUED | OUTPATIENT
Start: 2017-09-19 | End: 2017-09-25

## 2017-09-19 RX ORDER — INSULIN LISPRO 100/ML
VIAL (ML) SUBCUTANEOUS
Qty: 0 | Refills: 0 | Status: DISCONTINUED | OUTPATIENT
Start: 2017-09-19 | End: 2017-09-25

## 2017-09-19 RX ORDER — DEXTROSE 50 % IN WATER 50 %
12.5 SYRINGE (ML) INTRAVENOUS ONCE
Qty: 0 | Refills: 0 | Status: DISCONTINUED | OUTPATIENT
Start: 2017-09-19 | End: 2017-09-25

## 2017-09-19 RX ADMIN — LAMOTRIGINE 200 MILLIGRAM(S): 25 TABLET, ORALLY DISINTEGRATING ORAL at 20:40

## 2017-09-19 RX ADMIN — METFORMIN HYDROCHLORIDE 500 MILLIGRAM(S): 850 TABLET ORAL at 16:26

## 2017-09-19 RX ADMIN — GABAPENTIN 100 MILLIGRAM(S): 400 CAPSULE ORAL at 14:08

## 2017-09-19 RX ADMIN — GABAPENTIN 200 MILLIGRAM(S): 400 CAPSULE ORAL at 20:39

## 2017-09-19 RX ADMIN — ATORVASTATIN CALCIUM 10 MILLIGRAM(S): 80 TABLET, FILM COATED ORAL at 20:39

## 2017-09-19 RX ADMIN — DULOXETINE HYDROCHLORIDE 20 MILLIGRAM(S): 30 CAPSULE, DELAYED RELEASE ORAL at 08:19

## 2017-09-19 RX ADMIN — GABAPENTIN 100 MILLIGRAM(S): 400 CAPSULE ORAL at 08:19

## 2017-09-19 RX ADMIN — ATENOLOL 25 MILLIGRAM(S): 25 TABLET ORAL at 08:19

## 2017-09-19 RX ADMIN — PREGABALIN 500 MICROGRAM(S): 225 CAPSULE ORAL at 08:19

## 2017-09-19 RX ADMIN — MIRTAZAPINE 30 MILLIGRAM(S): 45 TABLET, ORALLY DISINTEGRATING ORAL at 20:40

## 2017-09-19 RX ADMIN — Medication 3 MILLIGRAM(S): at 21:27

## 2017-09-20 PROCEDURE — 99232 SBSQ HOSP IP/OBS MODERATE 35: CPT | Mod: GC

## 2017-09-20 RX ORDER — GABAPENTIN 400 MG/1
100 CAPSULE ORAL THREE TIMES A DAY
Qty: 0 | Refills: 0 | Status: DISCONTINUED | OUTPATIENT
Start: 2017-09-20 | End: 2017-09-25

## 2017-09-20 RX ADMIN — PREGABALIN 500 MICROGRAM(S): 225 CAPSULE ORAL at 09:54

## 2017-09-20 RX ADMIN — METFORMIN HYDROCHLORIDE 500 MILLIGRAM(S): 850 TABLET ORAL at 17:43

## 2017-09-20 RX ADMIN — GABAPENTIN 100 MILLIGRAM(S): 400 CAPSULE ORAL at 12:59

## 2017-09-20 RX ADMIN — ATENOLOL 25 MILLIGRAM(S): 25 TABLET ORAL at 09:54

## 2017-09-20 RX ADMIN — LAMOTRIGINE 200 MILLIGRAM(S): 25 TABLET, ORALLY DISINTEGRATING ORAL at 21:16

## 2017-09-20 RX ADMIN — GABAPENTIN 100 MILLIGRAM(S): 400 CAPSULE ORAL at 09:54

## 2017-09-20 RX ADMIN — Medication 3 MILLIGRAM(S): at 22:03

## 2017-09-20 RX ADMIN — MIRTAZAPINE 30 MILLIGRAM(S): 45 TABLET, ORALLY DISINTEGRATING ORAL at 21:16

## 2017-09-20 RX ADMIN — GABAPENTIN 100 MILLIGRAM(S): 400 CAPSULE ORAL at 21:16

## 2017-09-20 RX ADMIN — ATORVASTATIN CALCIUM 10 MILLIGRAM(S): 80 TABLET, FILM COATED ORAL at 21:16

## 2017-09-21 PROCEDURE — 99232 SBSQ HOSP IP/OBS MODERATE 35: CPT | Mod: GC

## 2017-09-21 RX ORDER — MIRTAZAPINE 45 MG/1
37.5 TABLET, ORALLY DISINTEGRATING ORAL AT BEDTIME
Qty: 0 | Refills: 0 | Status: DISCONTINUED | OUTPATIENT
Start: 2017-09-21 | End: 2017-09-25

## 2017-09-21 RX ADMIN — PREGABALIN 500 MICROGRAM(S): 225 CAPSULE ORAL at 08:40

## 2017-09-21 RX ADMIN — MIRTAZAPINE 37.5 MILLIGRAM(S): 45 TABLET, ORALLY DISINTEGRATING ORAL at 21:27

## 2017-09-21 RX ADMIN — GABAPENTIN 100 MILLIGRAM(S): 400 CAPSULE ORAL at 12:26

## 2017-09-21 RX ADMIN — ATENOLOL 25 MILLIGRAM(S): 25 TABLET ORAL at 08:40

## 2017-09-21 RX ADMIN — METFORMIN HYDROCHLORIDE 500 MILLIGRAM(S): 850 TABLET ORAL at 16:53

## 2017-09-21 RX ADMIN — GABAPENTIN 100 MILLIGRAM(S): 400 CAPSULE ORAL at 21:27

## 2017-09-21 RX ADMIN — ATORVASTATIN CALCIUM 10 MILLIGRAM(S): 80 TABLET, FILM COATED ORAL at 21:27

## 2017-09-21 RX ADMIN — GABAPENTIN 100 MILLIGRAM(S): 400 CAPSULE ORAL at 08:40

## 2017-09-21 RX ADMIN — LAMOTRIGINE 200 MILLIGRAM(S): 25 TABLET, ORALLY DISINTEGRATING ORAL at 21:27

## 2017-09-22 PROCEDURE — 90832 PSYTX W PT 30 MINUTES: CPT

## 2017-09-22 PROCEDURE — 99232 SBSQ HOSP IP/OBS MODERATE 35: CPT | Mod: GC

## 2017-09-22 RX ADMIN — GABAPENTIN 100 MILLIGRAM(S): 400 CAPSULE ORAL at 21:15

## 2017-09-22 RX ADMIN — GABAPENTIN 100 MILLIGRAM(S): 400 CAPSULE ORAL at 12:19

## 2017-09-22 RX ADMIN — ATENOLOL 25 MILLIGRAM(S): 25 TABLET ORAL at 08:32

## 2017-09-22 RX ADMIN — PREGABALIN 500 MICROGRAM(S): 225 CAPSULE ORAL at 08:32

## 2017-09-22 RX ADMIN — ATORVASTATIN CALCIUM 10 MILLIGRAM(S): 80 TABLET, FILM COATED ORAL at 21:15

## 2017-09-22 RX ADMIN — METFORMIN HYDROCHLORIDE 500 MILLIGRAM(S): 850 TABLET ORAL at 16:46

## 2017-09-22 RX ADMIN — MIRTAZAPINE 37.5 MILLIGRAM(S): 45 TABLET, ORALLY DISINTEGRATING ORAL at 21:15

## 2017-09-22 RX ADMIN — LAMOTRIGINE 200 MILLIGRAM(S): 25 TABLET, ORALLY DISINTEGRATING ORAL at 21:15

## 2017-09-22 RX ADMIN — Medication: at 08:05

## 2017-09-22 RX ADMIN — GABAPENTIN 100 MILLIGRAM(S): 400 CAPSULE ORAL at 08:32

## 2017-09-23 RX ADMIN — METFORMIN HYDROCHLORIDE 500 MILLIGRAM(S): 850 TABLET ORAL at 16:45

## 2017-09-23 RX ADMIN — ATENOLOL 25 MILLIGRAM(S): 25 TABLET ORAL at 08:39

## 2017-09-23 RX ADMIN — GABAPENTIN 100 MILLIGRAM(S): 400 CAPSULE ORAL at 21:37

## 2017-09-23 RX ADMIN — GABAPENTIN 100 MILLIGRAM(S): 400 CAPSULE ORAL at 08:39

## 2017-09-23 RX ADMIN — MIRTAZAPINE 37.5 MILLIGRAM(S): 45 TABLET, ORALLY DISINTEGRATING ORAL at 21:38

## 2017-09-23 RX ADMIN — LAMOTRIGINE 200 MILLIGRAM(S): 25 TABLET, ORALLY DISINTEGRATING ORAL at 21:38

## 2017-09-23 RX ADMIN — PREGABALIN 500 MICROGRAM(S): 225 CAPSULE ORAL at 08:39

## 2017-09-23 RX ADMIN — ATORVASTATIN CALCIUM 10 MILLIGRAM(S): 80 TABLET, FILM COATED ORAL at 21:37

## 2017-09-23 RX ADMIN — GABAPENTIN 100 MILLIGRAM(S): 400 CAPSULE ORAL at 14:02

## 2017-09-24 RX ADMIN — GABAPENTIN 100 MILLIGRAM(S): 400 CAPSULE ORAL at 13:11

## 2017-09-24 RX ADMIN — PREGABALIN 500 MICROGRAM(S): 225 CAPSULE ORAL at 09:05

## 2017-09-24 RX ADMIN — GABAPENTIN 100 MILLIGRAM(S): 400 CAPSULE ORAL at 22:25

## 2017-09-24 RX ADMIN — ATENOLOL 25 MILLIGRAM(S): 25 TABLET ORAL at 09:05

## 2017-09-24 RX ADMIN — ATORVASTATIN CALCIUM 10 MILLIGRAM(S): 80 TABLET, FILM COATED ORAL at 22:25

## 2017-09-24 RX ADMIN — GABAPENTIN 100 MILLIGRAM(S): 400 CAPSULE ORAL at 09:06

## 2017-09-24 RX ADMIN — LAMOTRIGINE 200 MILLIGRAM(S): 25 TABLET, ORALLY DISINTEGRATING ORAL at 20:52

## 2017-09-24 RX ADMIN — MIRTAZAPINE 37.5 MILLIGRAM(S): 45 TABLET, ORALLY DISINTEGRATING ORAL at 20:52

## 2017-09-24 RX ADMIN — Medication: at 08:44

## 2017-09-24 RX ADMIN — METFORMIN HYDROCHLORIDE 500 MILLIGRAM(S): 850 TABLET ORAL at 16:37

## 2017-09-25 VITALS — TEMPERATURE: 97 F | RESPIRATION RATE: 18 BRPM

## 2017-09-25 PROCEDURE — 90853 GROUP PSYCHOTHERAPY: CPT

## 2017-09-25 PROCEDURE — 99239 HOSP IP/OBS DSCHRG MGMT >30: CPT | Mod: GC

## 2017-09-25 RX ORDER — DILTIAZEM HCL 120 MG
1 CAPSULE, EXT RELEASE 24 HR ORAL
Qty: 0 | Refills: 0 | DISCHARGE
Start: 2017-09-25

## 2017-09-25 RX ORDER — MIRTAZAPINE 45 MG/1
5 TABLET, ORALLY DISINTEGRATING ORAL
Qty: 70 | Refills: 0
Start: 2017-09-25 | End: 2017-10-09

## 2017-09-25 RX ORDER — METFORMIN HYDROCHLORIDE 850 MG/1
1 TABLET ORAL
Qty: 0 | Refills: 0 | DISCHARGE
Start: 2017-09-25

## 2017-09-25 RX ORDER — LAMOTRIGINE 25 MG/1
1 TABLET, ORALLY DISINTEGRATING ORAL
Qty: 14 | Refills: 0
Start: 2017-09-25 | End: 2017-10-09

## 2017-09-25 RX ORDER — LANOLIN ALCOHOL/MO/W.PET/CERES
1 CREAM (GRAM) TOPICAL
Qty: 14 | Refills: 0
Start: 2017-09-25 | End: 2017-10-09

## 2017-09-25 RX ORDER — GABAPENTIN 400 MG/1
1 CAPSULE ORAL
Qty: 42 | Refills: 0
Start: 2017-09-25 | End: 2017-10-09

## 2017-09-25 RX ORDER — ATORVASTATIN CALCIUM 80 MG/1
1 TABLET, FILM COATED ORAL
Qty: 0 | Refills: 0 | DISCHARGE
Start: 2017-09-25

## 2017-09-25 RX ORDER — ATENOLOL 25 MG/1
1 TABLET ORAL
Qty: 0 | Refills: 0 | DISCHARGE
Start: 2017-09-25

## 2017-09-25 RX ADMIN — GABAPENTIN 100 MILLIGRAM(S): 400 CAPSULE ORAL at 12:58

## 2017-09-25 RX ADMIN — GABAPENTIN 100 MILLIGRAM(S): 400 CAPSULE ORAL at 09:31

## 2017-09-25 RX ADMIN — PREGABALIN 500 MICROGRAM(S): 225 CAPSULE ORAL at 09:31

## 2017-09-25 RX ADMIN — ATENOLOL 25 MILLIGRAM(S): 25 TABLET ORAL at 09:31

## 2017-12-12 ENCOUNTER — APPOINTMENT (OUTPATIENT)
Dept: CARDIOLOGY | Facility: CLINIC | Age: 62
End: 2017-12-12

## 2017-12-19 ENCOUNTER — APPOINTMENT (OUTPATIENT)
Dept: CARDIOLOGY | Facility: CLINIC | Age: 62
End: 2017-12-19
Payer: COMMERCIAL

## 2017-12-19 ENCOUNTER — NON-APPOINTMENT (OUTPATIENT)
Age: 62
End: 2017-12-19

## 2017-12-19 VITALS
HEIGHT: 62 IN | SYSTOLIC BLOOD PRESSURE: 103 MMHG | OXYGEN SATURATION: 96 % | HEART RATE: 57 BPM | DIASTOLIC BLOOD PRESSURE: 66 MMHG | BODY MASS INDEX: 40.85 KG/M2 | WEIGHT: 222 LBS

## 2017-12-19 PROCEDURE — 99214 OFFICE O/P EST MOD 30 MIN: CPT | Mod: 25

## 2017-12-19 PROCEDURE — 93000 ELECTROCARDIOGRAM COMPLETE: CPT

## 2018-03-14 ENCOUNTER — MEDICATION RENEWAL (OUTPATIENT)
Age: 63
End: 2018-03-14

## 2018-03-19 ENCOUNTER — MEDICATION RENEWAL (OUTPATIENT)
Age: 63
End: 2018-03-19

## 2018-06-19 ENCOUNTER — NON-APPOINTMENT (OUTPATIENT)
Age: 63
End: 2018-06-19

## 2018-06-19 ENCOUNTER — APPOINTMENT (OUTPATIENT)
Dept: CARDIOLOGY | Facility: CLINIC | Age: 63
End: 2018-06-19
Payer: COMMERCIAL

## 2018-06-19 VITALS
HEIGHT: 62 IN | OXYGEN SATURATION: 93 % | HEART RATE: 55 BPM | SYSTOLIC BLOOD PRESSURE: 104 MMHG | WEIGHT: 135 LBS | BODY MASS INDEX: 24.84 KG/M2 | DIASTOLIC BLOOD PRESSURE: 69 MMHG

## 2018-06-19 PROCEDURE — 93000 ELECTROCARDIOGRAM COMPLETE: CPT

## 2018-06-19 PROCEDURE — 99214 OFFICE O/P EST MOD 30 MIN: CPT | Mod: 25

## 2018-07-24 ENCOUNTER — RX RENEWAL (OUTPATIENT)
Age: 63
End: 2018-07-24

## 2018-07-30 ENCOUNTER — MEDICATION RENEWAL (OUTPATIENT)
Age: 63
End: 2018-07-30

## 2018-09-27 VITALS — BODY MASS INDEX: 45.27 KG/M2 | WEIGHT: 246 LBS | HEIGHT: 62 IN

## 2018-10-22 ENCOUNTER — MEDICATION RENEWAL (OUTPATIENT)
Age: 63
End: 2018-10-22

## 2018-11-21 ENCOUNTER — RX RENEWAL (OUTPATIENT)
Age: 63
End: 2018-11-21

## 2019-01-04 ENCOUNTER — NON-APPOINTMENT (OUTPATIENT)
Age: 64
End: 2019-01-04

## 2019-01-04 ENCOUNTER — APPOINTMENT (OUTPATIENT)
Dept: CARDIOLOGY | Facility: CLINIC | Age: 64
End: 2019-01-04
Payer: COMMERCIAL

## 2019-01-04 VITALS — HEIGHT: 62 IN | BODY MASS INDEX: 46.01 KG/M2 | WEIGHT: 250 LBS

## 2019-01-04 VITALS — SYSTOLIC BLOOD PRESSURE: 131 MMHG | DIASTOLIC BLOOD PRESSURE: 81 MMHG | OXYGEN SATURATION: 96 % | HEART RATE: 54 BPM

## 2019-01-04 PROCEDURE — 99214 OFFICE O/P EST MOD 30 MIN: CPT

## 2019-01-04 PROCEDURE — 93000 ELECTROCARDIOGRAM COMPLETE: CPT

## 2019-01-04 NOTE — PHYSICAL EXAM
[General Appearance - Well Developed] : well developed [Normal Conjunctiva] : the conjunctiva exhibited no abnormalities [Normal Oral Mucosa] : normal oral mucosa [] : no respiratory distress [Respiration, Rhythm And Depth] : normal respiratory rhythm and effort [Exaggerated Use Of Accessory Muscles For Inspiration] : no accessory muscle use [Auscultation Breath Sounds / Voice Sounds] : lungs were clear to auscultation bilaterally [Chest Palpation] : palpation of the chest revealed no abnormalities [Lungs Percussion] : the lungs were normal to percussion [Heart Rate And Rhythm] : heart rate and rhythm were normal [Heart Sounds] : normal S1 and S2 [Edema] : no peripheral edema present [Veins - Varicosity Changes] : no varicosital changes were noted in the lower extremities [Systolic grade ___/6] : A grade [unfilled]/6 systolic murmur was heard. [FreeTextEntry1] : MSC [Bowel Sounds] : normal bowel sounds [Abdomen Soft] : soft [Abdomen Mass (___ Cm)] : no abdominal mass palpated [Abnormal Walk] : normal gait [Nail Clubbing] : no clubbing of the fingernails [Cyanosis, Localized] : no localized cyanosis [Skin Turgor] : normal skin turgor [Oriented To Time, Place, And Person] : oriented to person, place, and time [Normal Appearance] : was normal in appearance [Neck Supple] : was supple

## 2019-01-04 NOTE — DISCUSSION/SUMMARY
[Anxiety] : anxiety disorder NOS [Paroxysmal SVT] : paroxysmal supraventricular tachycardia [Patient] : the patient [Paroxysmal Atrial Tachycardia] : paroxysmal atrial tachycardia [Stable] : stable [None] : none

## 2019-01-04 NOTE — REASON FOR VISIT
[Follow-Up - Clinic] : a clinic follow-up of [Hyperlipidemia] : hyperlipidemia [Medication Management] : Medication management [Mitral Regurgitation] : mitral regurgitation [Palpitations] : palpitations [Supraventricular Tachycardia] : supraventricular tachycardia [FreeTextEntry1] : Morbid obesity

## 2019-01-04 NOTE — REVIEW OF SYSTEMS
[Fever] : no fever [Chills] : no chills [Blurry Vision] : no blurred vision [Earache] : no earache [Mouth Sores] : no mouth sores [Shortness Of Breath] : no shortness of breath [Dyspnea on exertion] : not dyspnea during exertion [Chest  Pressure] : no chest pressure [Chest Pain] : no chest pain [Lower Ext Edema] : no extremity edema [Leg Claudication] : no intermittent leg claudication [Palpitations] : no palpitations [Cough] : no cough [Wheezing] : no wheezing [see HPI] : see HPI [Incontinence] : no incontinence [Joint Pain] : no joint pain

## 2019-01-04 NOTE — HISTORY OF PRESENT ILLNESS
[FreeTextEntry1] : Patient with morbid obesity , MVP PSVT. bipolar disorder, sleep apnea not compliant to CPAP, hypothyroidism  DM came for follow up . says she is doing well , had blood work with primary ,  blood work results were acceptable  as per patient \par \par Patient denies any new complaints , her cholesterol is controlled as well as her sugar  no reports available \par \par Patient says her bipolar is under control .Patient is not complaint to CPAP machine \par \par \par Patient was placed on medication for bipolar since February 2013 \par \par \par

## 2019-01-20 ENCOUNTER — RX RENEWAL (OUTPATIENT)
Age: 64
End: 2019-01-20

## 2019-04-20 ENCOUNTER — RX RENEWAL (OUTPATIENT)
Age: 64
End: 2019-04-20

## 2019-04-22 ENCOUNTER — RX RENEWAL (OUTPATIENT)
Age: 64
End: 2019-04-22

## 2019-06-13 ENCOUNTER — APPOINTMENT (OUTPATIENT)
Dept: BARIATRICS | Facility: CLINIC | Age: 64
End: 2019-06-13
Payer: COMMERCIAL

## 2019-06-13 VITALS
BODY MASS INDEX: 47.2 KG/M2 | OXYGEN SATURATION: 95 % | SYSTOLIC BLOOD PRESSURE: 122 MMHG | DIASTOLIC BLOOD PRESSURE: 80 MMHG | HEART RATE: 64 BPM | WEIGHT: 250 LBS | HEIGHT: 61 IN

## 2019-06-13 PROCEDURE — 99244 OFF/OP CNSLTJ NEW/EST MOD 40: CPT

## 2019-06-13 NOTE — REVIEW OF SYSTEMS
[Recent Change In Weight] : ~T recent weight change [Palpitations] : palpitations [Negative] : Allergic/Immunologic [FreeTextEntry9] : joint swelling

## 2019-06-13 NOTE — PHYSICAL EXAM
[Obese, well nourished, in no acute distress] : obese, well nourished, in no acute distress [Normal] : affect appropriate [de-identified] : obese, soft, nontender, no evidence of hernia

## 2019-06-13 NOTE — HISTORY OF PRESENT ILLNESS
[de-identified] : 63 year old woman with a long-standing history of morbid obesity, who has attempted numerous weight loss treatments without long term success. Patient is familiar with the Laparoscopic Adjustable Gastric Band, the Laparoscopic Sleeve Gastrectomy and the Laparoscopic Gastric Bypass. Patient presents today to discuss options for surgery, specifically the Laparoscopic Sleeve Gastrectomy.

## 2019-06-13 NOTE — ASSESSMENT
[FreeTextEntry1] : 63 year old woman with long-standing history of morbid obesity presents today to discuss options for weight loss surgery.  I had an extensive discussion with the patient reviewing the Laparoscopic Sleeve Gastrectomy. Diagrams were used. All questions were answered.  \par \par Complications were discussed including but not limited to: vitamin and protein deficiencies, pneumonia, urinary infection, wound infection, leaks/peritonitis possibly requiring intraabdominal drains or reoperation, bleeding, DVT, pulmonary embolus, severe reflux, sleeve obstruction, abdominal wall hernias, revisions, death, inadequate weight loss. The importance of vitamins and protein supplementation was stressed, as was the importance of follow-up and exercise. \par \par Patient encouraged to make dietary and lifestyle changes in preparation for surgery.\par \par Patient with a long history of morbid obesity.She is interested in the Laparoscopic Sleeve Gastrectomy. She was given written material to review.  Pre-operative evaluations were reviewed. She will need to lose weight prior to surgery and will be seen again prior to surgery.She was told to call with any questions.\par

## 2019-06-13 NOTE — CONSULT LETTER
[Dear  ___] : Dear  [unfilled], [Consult Letter:] : I had the pleasure of evaluating your patient, [unfilled]. [Please see my note below.] : Please see my note below. [Consult Closing:] : Thank you very much for allowing me to participate in the care of this patient.  If you have any questions, please do not hesitate to contact me. [Sincerely,] : Sincerely, [FreeTextEntry3] : Yessica Granado MD, FACS

## 2019-06-21 ENCOUNTER — NON-APPOINTMENT (OUTPATIENT)
Age: 64
End: 2019-06-21

## 2019-06-21 ENCOUNTER — APPOINTMENT (OUTPATIENT)
Dept: CARDIOLOGY | Facility: CLINIC | Age: 64
End: 2019-06-21
Payer: COMMERCIAL

## 2019-06-21 ENCOUNTER — MEDICATION RENEWAL (OUTPATIENT)
Age: 64
End: 2019-06-21

## 2019-06-21 VITALS
SYSTOLIC BLOOD PRESSURE: 123 MMHG | WEIGHT: 246 LBS | HEIGHT: 61 IN | DIASTOLIC BLOOD PRESSURE: 78 MMHG | BODY MASS INDEX: 46.44 KG/M2 | OXYGEN SATURATION: 96 % | HEART RATE: 54 BPM

## 2019-06-21 PROCEDURE — 99214 OFFICE O/P EST MOD 30 MIN: CPT | Mod: 25

## 2019-06-21 PROCEDURE — 93000 ELECTROCARDIOGRAM COMPLETE: CPT

## 2019-06-21 RX ORDER — MIRTAZAPINE 45 MG/1
45 TABLET, FILM COATED ORAL
Refills: 0 | Status: ACTIVE | COMMUNITY

## 2019-06-21 RX ORDER — CHLORHEXIDINE GLUCONATE 4 %
LIQUID (ML) TOPICAL
Refills: 0 | Status: ACTIVE | COMMUNITY

## 2019-06-21 NOTE — REVIEW OF SYSTEMS
[Fever] : no fever [Chills] : no chills [Earache] : no earache [Blurry Vision] : no blurred vision [Shortness Of Breath] : no shortness of breath [Mouth Sores] : no mouth sores [Dyspnea on exertion] : not dyspnea during exertion [Chest Pain] : no chest pain [Chest  Pressure] : no chest pressure [Lower Ext Edema] : no extremity edema [Cough] : no cough [Leg Claudication] : no intermittent leg claudication [Wheezing] : no wheezing [see HPI] : see HPI [Incontinence] : no incontinence [Joint Pain] : no joint pain

## 2019-06-21 NOTE — DISCUSSION/SUMMARY
[Anxiety] : anxiety disorder NOS [Patient] : the patient [Paroxysmal SVT] : paroxysmal supraventricular tachycardia [Stable] : stable [None] : none [Paroxysmal Atrial Tachycardia] : paroxysmal atrial tachycardia

## 2019-06-21 NOTE — HISTORY OF PRESENT ILLNESS
[FreeTextEntry1] : Patient with morbid obesity , MVP PSVT. bipolar disorder, sleep apnea not compliant to CPAP, hypothyroidism  DM came for pre op evaluation for bariatric surgery , as she decided to consider to have surgery  . says she is doing well ,\par \par Patient denies any new complaints , her cholesterol is controlled as well as her sugar   HB A1c 6.5 as per patient \par \par Patient says her bipolar is under control .Patient is not complaint to CPAP machine \par \par \par Patient was placed on medication for bipolar since February 2013 \par \par \par

## 2019-06-21 NOTE — REASON FOR VISIT
[Hyperlipidemia] : hyperlipidemia [Follow-Up - Clinic] : a clinic follow-up of [Medication Management] : Medication management [Palpitations] : palpitations [Mitral Regurgitation] : mitral regurgitation [Supraventricular Tachycardia] : supraventricular tachycardia [FreeTextEntry1] : Morbid obesity , pre op

## 2019-06-21 NOTE — PHYSICAL EXAM
[Normal Conjunctiva] : the conjunctiva exhibited no abnormalities [General Appearance - Well Developed] : well developed [Respiration, Rhythm And Depth] : normal respiratory rhythm and effort [Normal Oral Mucosa] : normal oral mucosa [] : no respiratory distress [Chest Palpation] : palpation of the chest revealed no abnormalities [Lungs Percussion] : the lungs were normal to percussion [Exaggerated Use Of Accessory Muscles For Inspiration] : no accessory muscle use [Auscultation Breath Sounds / Voice Sounds] : lungs were clear to auscultation bilaterally [Heart Rate And Rhythm] : heart rate and rhythm were normal [Heart Sounds] : normal S1 and S2 [Edema] : no peripheral edema present [Systolic grade ___/6] : A grade [unfilled]/6 systolic murmur was heard. [Veins - Varicosity Changes] : no varicosital changes were noted in the lower extremities [Abdomen Soft] : soft [Abdomen Mass (___ Cm)] : no abdominal mass palpated [Bowel Sounds] : normal bowel sounds [FreeTextEntry1] : MSC [Abnormal Walk] : normal gait [Nail Clubbing] : no clubbing of the fingernails [Oriented To Time, Place, And Person] : oriented to person, place, and time [Cyanosis, Localized] : no localized cyanosis [Skin Turgor] : normal skin turgor [Normal Appearance] : was normal in appearance [Neck Supple] : was supple

## 2019-06-22 ENCOUNTER — TRANSCRIPTION ENCOUNTER (OUTPATIENT)
Age: 64
End: 2019-06-22

## 2019-06-24 ENCOUNTER — MEDICATION RENEWAL (OUTPATIENT)
Age: 64
End: 2019-06-24

## 2019-06-26 ENCOUNTER — APPOINTMENT (OUTPATIENT)
Dept: CARDIOLOGY | Facility: CLINIC | Age: 64
End: 2019-06-26
Payer: COMMERCIAL

## 2019-06-26 PROCEDURE — 93306 TTE W/DOPPLER COMPLETE: CPT

## 2019-07-01 ENCOUNTER — APPOINTMENT (OUTPATIENT)
Dept: CARDIOLOGY | Facility: CLINIC | Age: 64
End: 2019-07-01

## 2019-07-15 ENCOUNTER — APPOINTMENT (OUTPATIENT)
Dept: GASTROENTEROLOGY | Facility: CLINIC | Age: 64
End: 2019-07-15
Payer: COMMERCIAL

## 2019-07-15 VITALS
HEIGHT: 61 IN | BODY MASS INDEX: 44.93 KG/M2 | SYSTOLIC BLOOD PRESSURE: 127 MMHG | OXYGEN SATURATION: 97 % | WEIGHT: 238 LBS | DIASTOLIC BLOOD PRESSURE: 71 MMHG | HEART RATE: 59 BPM

## 2019-07-15 DIAGNOSIS — Z01.818 ENCOUNTER FOR OTHER PREPROCEDURAL EXAMINATION: ICD-10-CM

## 2019-07-15 PROCEDURE — 99244 OFF/OP CNSLTJ NEW/EST MOD 40: CPT

## 2019-07-15 NOTE — PHYSICAL EXAM
[General Appearance - Alert] : alert [General Appearance - In No Acute Distress] : in no acute distress [Sclera] : the sclera and conjunctiva were normal [Extraocular Movements] : extraocular movements were intact [Outer Ear] : the ears and nose were normal in appearance [Hearing Threshold Finger Rub Not Taliaferro] : hearing was normal [Neck Appearance] : the appearance of the neck was normal [Neck Cervical Mass (___cm)] : no neck mass was observed [Auscultation Breath Sounds / Voice Sounds] : lungs were clear to auscultation bilaterally [Heart Rate And Rhythm] : heart rate was normal and rhythm regular [Heart Sounds] : normal S1 and S2 [Heart Sounds Gallop] : no gallops [Murmurs] : no murmurs [Heart Sounds Pericardial Friction Rub] : no pericardial rub [Bowel Sounds] : normal bowel sounds [Abdomen Soft] : soft [Abdomen Tenderness] : non-tender [Abdomen Mass (___ Cm)] : no abdominal mass palpated [Cervical Lymph Nodes Enlarged Posterior Bilaterally] : posterior cervical [Cervical Lymph Nodes Enlarged Anterior Bilaterally] : anterior cervical [Abnormal Walk] : normal gait [Nail Clubbing] : no clubbing  or cyanosis of the fingernails [Skin Color & Pigmentation] : normal skin color and pigmentation [] : no rash [Oriented To Time, Place, And Person] : oriented to person, place, and time [Impaired Insight] : insight and judgment were intact [Affect] : the affect was normal

## 2019-07-16 ENCOUNTER — APPOINTMENT (OUTPATIENT)
Dept: CARDIOLOGY | Facility: CLINIC | Age: 64
End: 2019-07-16
Payer: COMMERCIAL

## 2019-07-16 PROBLEM — Z01.818 PRE-OP TESTING: Status: ACTIVE | Noted: 2019-06-13

## 2019-07-16 PROCEDURE — 93015 CV STRESS TEST SUPVJ I&R: CPT

## 2019-07-16 PROCEDURE — 78452 HT MUSCLE IMAGE SPECT MULT: CPT

## 2019-07-16 PROCEDURE — A9500: CPT

## 2019-07-16 NOTE — ASSESSMENT
[FreeTextEntry1] : 62 y/o morbid obesity , MVP PSVT. bipolar disorder, sleep apnea not compliant to CPAP, hypothyroidism DM, who is referred for preop evaluation prior to gastric sleeve.  \par \par The patient is awaiting Laparoscopic Sleeve Gastrectomy.  She would need an upper endoscopy prior to a Sleeve Gastrectomy, to rule out peptic ulcer disease, H. pylori bacteria, precancerous cells such as Barrios's esophagus, lesion etc. under monitored anesthesia care. The risks of the procedure including perforation, bleeding, risks of anesthesia were discussed with the patient she is willing to proceed.\par

## 2019-07-16 NOTE — HISTORY OF PRESENT ILLNESS
[de-identified] : 64 y/o morbid obesity , MVP PSVT. bipolar disorder, sleep apnea not compliant to CPAP, hypothyroidism DM, who is referred for preop evaluation prior to gastric sleeve.  \par \par Patient reports having a history of IBS.   Her IBS symptoms are abdominal cramping and mucous diarrhea.  She was placed on a low FODMAP diet and since then her IBS symptoms are much better.\par \par She underwent a colonoscopy a year ago. She was recommended another colonoscopy in 6 years.\par \par She denies any heartburn, reflux, regurgitation, dysphagia, odynophagia, nausea, vomiting, abnormal weight loss, melena and hematochezia.\par \par She's never had an upper endoscopy.\par \par She denies any family history of gastrointestinal cancers.

## 2019-07-18 ENCOUNTER — APPOINTMENT (OUTPATIENT)
Dept: BARIATRICS | Facility: CLINIC | Age: 64
End: 2019-07-18
Payer: COMMERCIAL

## 2019-07-18 ENCOUNTER — APPOINTMENT (OUTPATIENT)
Dept: BARIATRICS/WEIGHT MGMT | Facility: CLINIC | Age: 64
End: 2019-07-18
Payer: COMMERCIAL

## 2019-07-18 ENCOUNTER — APPOINTMENT (OUTPATIENT)
Dept: CARDIOLOGY | Facility: CLINIC | Age: 64
End: 2019-07-18

## 2019-07-18 VITALS
WEIGHT: 242.72 LBS | HEIGHT: 61 IN | HEART RATE: 49 BPM | OXYGEN SATURATION: 96 % | DIASTOLIC BLOOD PRESSURE: 80 MMHG | SYSTOLIC BLOOD PRESSURE: 120 MMHG | BODY MASS INDEX: 45.83 KG/M2

## 2019-07-18 DIAGNOSIS — Z78.9 OTHER SPECIFIED HEALTH STATUS: ICD-10-CM

## 2019-07-18 PROCEDURE — 99214 OFFICE O/P EST MOD 30 MIN: CPT

## 2019-07-18 PROCEDURE — 90791 PSYCH DIAGNOSTIC EVALUATION: CPT

## 2019-07-18 RX ORDER — ATORVASTATIN CALCIUM 10 MG/1
10 TABLET, FILM COATED ORAL DAILY
Qty: 90 | Refills: 3 | Status: COMPLETED | COMMUNITY
End: 2019-07-18

## 2019-07-19 ENCOUNTER — RX RENEWAL (OUTPATIENT)
Age: 64
End: 2019-07-19

## 2019-07-22 NOTE — ASSESSMENT
[FreeTextEntry1] : 63 year old F undergoing workup for laparoscopic sleeve gastrectomy here for WEIGH IN VISIT. Weight loss since last visit.  Patient is currently in the process of completing her workup as well as a medically supervised diet. \par \par Appointments for clearances and testing have been scheduled. \par Pre op nutrition classes - 8/6/19 - 8/13/2019\par Nutrition one on one - 8/16/2019  Psych consult - 4/18/2019\par 3 additional weigh in visits.\par Upper EGD - 8/1/2019   Sleep study 7/25/19\par Needs pulmonary / cardiac clearance prior to surgery. \par \par Nutritional counseling has been provided. The patient is encouraged to remain calorie conscious and continue a low fat, low carbohydrate, protein focus diet. Pt encouraged to participate in a daily exercise regimen incorporating cardio and strength training. \par \par Return to office in 4 weeks or earlier if any concerns. \par  \par

## 2019-07-22 NOTE — HISTORY OF PRESENT ILLNESS
[de-identified] : 63 year old F undergoing workup for laparoscopic sleeve gastrectomy here for WEIGH IN VISIT. Weight loss since last visit.  Patient is currently in the process of completing her workup as well as a medically supervised diet. Patient continues to make efforts to improve food choices and increased activity.Pt is following a protein focused diet - 3 meals a day - consuming a sufficient amount of zero calorie liquid.  Patient knows she needs to lose weight prior to surgery.Exercising regularly as recommended..All questions were answered.Discussed and reviewed further requirements needed prior to scheduling surgery. \par

## 2019-07-22 NOTE — PHYSICAL EXAM
[Obese, well nourished, in no acute distress] : obese, well nourished, in no acute distress [Normal] : affect appropriate [de-identified] : normoactive bowel sounds, soft and non tender, no hepatosplenomegaly or masses appreciated.

## 2019-07-22 NOTE — REVIEW OF SYSTEMS
[Recent Change In Weight] : ~T recent weight change [Negative] : Endocrine [Fever] : no fever [Dysphagia] : no dysphagia [Chills] : no chills [Chest Pain] : no chest pain [Palpitations] : no palpitations [Wheezing] : no wheezing [Shortness Of Breath] : no shortness of breath [Abdominal Pain] : no abdominal pain [Vomiting] : no vomiting [Constipation] : no constipation [Diarrhea] : no diarrhea [FreeTextEntry2] : weight loss since last visit.  [Reflux/Heartburn] : no reflux/ heartburn

## 2019-07-31 ENCOUNTER — TRANSCRIPTION ENCOUNTER (OUTPATIENT)
Age: 64
End: 2019-07-31

## 2019-08-01 ENCOUNTER — APPOINTMENT (OUTPATIENT)
Dept: GASTROENTEROLOGY | Facility: HOSPITAL | Age: 64
End: 2019-08-01

## 2019-08-01 ENCOUNTER — RESULT REVIEW (OUTPATIENT)
Age: 64
End: 2019-08-01

## 2019-08-01 ENCOUNTER — OUTPATIENT (OUTPATIENT)
Dept: OUTPATIENT SERVICES | Facility: HOSPITAL | Age: 64
LOS: 1 days | End: 2019-08-01
Payer: COMMERCIAL

## 2019-08-01 DIAGNOSIS — K21.9 GASTRO-ESOPHAGEAL REFLUX DISEASE WITHOUT ESOPHAGITIS: ICD-10-CM

## 2019-08-01 PROCEDURE — 88313 SPECIAL STAINS GROUP 2: CPT | Mod: 26

## 2019-08-01 PROCEDURE — 88305 TISSUE EXAM BY PATHOLOGIST: CPT | Mod: 26

## 2019-08-01 PROCEDURE — 88305 TISSUE EXAM BY PATHOLOGIST: CPT

## 2019-08-01 PROCEDURE — 43239 EGD BIOPSY SINGLE/MULTIPLE: CPT

## 2019-08-01 PROCEDURE — 88312 SPECIAL STAINS GROUP 1: CPT

## 2019-08-01 PROCEDURE — 88312 SPECIAL STAINS GROUP 1: CPT | Mod: 26

## 2019-08-01 PROCEDURE — 88313 SPECIAL STAINS GROUP 2: CPT

## 2019-08-16 ENCOUNTER — APPOINTMENT (OUTPATIENT)
Dept: BARIATRICS/WEIGHT MGMT | Facility: CLINIC | Age: 64
End: 2019-08-16
Payer: COMMERCIAL

## 2019-08-16 ENCOUNTER — APPOINTMENT (OUTPATIENT)
Dept: BARIATRICS | Facility: CLINIC | Age: 64
End: 2019-08-16
Payer: COMMERCIAL

## 2019-08-16 VITALS
BODY MASS INDEX: 44.95 KG/M2 | WEIGHT: 238.1 LBS | HEART RATE: 65 BPM | DIASTOLIC BLOOD PRESSURE: 76 MMHG | OXYGEN SATURATION: 96 % | HEIGHT: 61 IN | SYSTOLIC BLOOD PRESSURE: 118 MMHG

## 2019-08-16 VITALS — BODY MASS INDEX: 44.75 KG/M2 | WEIGHT: 237 LBS | HEIGHT: 61 IN

## 2019-08-16 PROCEDURE — 99214 OFFICE O/P EST MOD 30 MIN: CPT

## 2019-08-16 PROCEDURE — 97802 MEDICAL NUTRITION INDIV IN: CPT

## 2019-08-19 NOTE — PHYSICAL EXAM
[Obese, well nourished, in no acute distress] : obese, well nourished, in no acute distress [Normal] : grossly intact [de-identified] : normoactive bowel sounds, soft and non tender, no hepatosplenomegaly or masses appreciated.

## 2019-08-19 NOTE — ASSESSMENT
[FreeTextEntry1] : 63 year old F undergoing workup for laparoscopic sleeve gastrectomy here for WEIGH IN VISIT. Weight loss since last visit.  Patient is currently in the process of completing her workup as well as a medically supervised diet. \par \par Appointments for clearances and testing have been scheduled. \par Pre op nutrition classes -completed\par Nutrition one on one - 8/16/2019  Psych consult - outside clearance prior to surgery. \par 2 additional weigh in visits.- September , October. \par Upper EGD - completed - discussed results + Schatzki ring + small hiatal hernia  pathology - negative. \par Discussed and reviewed recent labs with patient. \par Needs pulmonary / cardiac clearance prior to surgery. \par \par Nutritional counseling has been provided. The patient is encouraged to remain calorie conscious and continue a low fat, low carbohydrate, protein focus diet. Pt encouraged to participate in a daily exercise regimen incorporating cardio and strength training. \par \par Return to office in 4 weeks or earlier if any concerns. \par  \par

## 2019-08-19 NOTE — REVIEW OF SYSTEMS
[Recent Change In Weight] : ~T recent weight change [Negative] : Endocrine [Fever] : no fever [Chills] : no chills [Dysphagia] : no dysphagia [Chest Pain] : no chest pain [Palpitations] : no palpitations [Wheezing] : no wheezing [Shortness Of Breath] : no shortness of breath [Constipation] : no constipation [Abdominal Pain] : no abdominal pain [Vomiting] : no vomiting [Diarrhea] : no diarrhea [Reflux/Heartburn] : no reflux/ heartburn [FreeTextEntry2] : weight loss since last visit.

## 2019-08-19 NOTE — HISTORY OF PRESENT ILLNESS
[de-identified] : 63 year old F undergoing workup for laparoscopic sleeve gastrectomy here for WEIGH IN VISIT. Weight loss since last visit.  Patient is currently in the process of completing her workup as well as a medically supervised diet. Patient continues to make efforts to improve food choices and increased activity.Pt is following a protein focused diet - 3 meals a day - consuming a sufficient amount of zero calorie liquid.  Patient knows she needs to lose weight prior to surgery.Exercising regularly as recommended..All questions were answered.Discussed and reviewed further requirements needed prior to scheduling surgery. \par

## 2019-09-17 ENCOUNTER — APPOINTMENT (OUTPATIENT)
Dept: BARIATRICS | Facility: CLINIC | Age: 64
End: 2019-09-17
Payer: COMMERCIAL

## 2019-09-17 VITALS
SYSTOLIC BLOOD PRESSURE: 118 MMHG | HEART RATE: 62 BPM | DIASTOLIC BLOOD PRESSURE: 76 MMHG | HEIGHT: 61 IN | WEIGHT: 232.36 LBS | OXYGEN SATURATION: 94 % | BODY MASS INDEX: 43.87 KG/M2

## 2019-09-17 PROCEDURE — 99214 OFFICE O/P EST MOD 30 MIN: CPT

## 2019-09-20 NOTE — REVIEW OF SYSTEMS
[Recent Change In Weight] : ~T recent weight change [Negative] : Psychiatric [Fever] : no fever [Chills] : no chills [Dysphagia] : no dysphagia [Chest Pain] : no chest pain [Palpitations] : no palpitations [Shortness Of Breath] : no shortness of breath [Wheezing] : no wheezing [Abdominal Pain] : no abdominal pain [Vomiting] : no vomiting [Constipation] : no constipation [Diarrhea] : no diarrhea [Reflux/Heartburn] : no reflux/ heartburn [Easy Bruising] : no tendency for easy bruising [Easy Bleeding] : no tendency for easy bleeding [FreeTextEntry2] : weight loss since last visit.

## 2019-09-20 NOTE — PHYSICAL EXAM
[Obese, well nourished, in no acute distress] : obese, well nourished, in no acute distress [Normal] : affect appropriate [de-identified] : normoactive bowel sounds, soft and non tender, no hepatosplenomegaly or masses appreciated.

## 2019-09-20 NOTE — HISTORY OF PRESENT ILLNESS
[de-identified] : 63 year old F undergoing workup for laparoscopic sleeve gastrectomy here for WEIGH IN VISIT. Weight loss since last visit.  Patient is currently in the process of completing her workup as well as a medically supervised diet. Patient continues to make efforts to improve food choices and increased activity.Pt is following a protein focused diet - 3 meals a day - consuming a sufficient amount of zero calorie liquid.  Patient knows she needs to lose weight prior to surgery.Exercising regularly as recommended.All questions were answered.Discussed and reviewed further requirements needed prior to scheduling surgery. Discussed and reviewed labs with patient. Plan to forward recent blood work including glucose and HbA1 C performed in July - forwarded to office. \par

## 2019-09-20 NOTE — ASSESSMENT
[FreeTextEntry1] : 63 year old F undergoing workup for laparoscopic sleeve gastrectomy here for WEIGH IN VISIT. Weight loss since last visit.  Patient is currently in the process of completing her workup as well as a medically supervised diet. \par \par Appointments for clearances and testing have been scheduled. \par Pre op nutrition classes -completed\par Nutrition one on one - 8/16/2019  Psych consult - received  outside clearance today\par 1 additional weigh in visits.- September , October. \par Awaiting letter of support from PCP. \par Upper EGD - completed - discussed results + Schatzki ring + small hiatal hernia  pathology - negative. \par Discussed and reviewed recent labs with patient. Plan to forward recent blood performed in July - including glucose and HBAIC.\par Needs pulmonary / cardiac clearance prior to surgery. \par \par Nutritional counseling has been provided. The patient is encouraged to remain calorie conscious and continue a low fat, low carbohydrate, protein focus diet. Pt encouraged to participate in a daily exercise regimen incorporating cardio and strength training. \par \par Return to office in 4 weeks or earlier if any concerns. \par  \par

## 2019-10-15 ENCOUNTER — APPOINTMENT (OUTPATIENT)
Dept: BARIATRICS | Facility: CLINIC | Age: 64
End: 2019-10-15

## 2019-10-15 ENCOUNTER — RESULT REVIEW (OUTPATIENT)
Age: 64
End: 2019-10-15

## 2019-10-15 DIAGNOSIS — Z13.228 ENCOUNTER FOR SCREENING FOR OTHER SUSPECTED ENDOCRINE DISORDER: ICD-10-CM

## 2019-10-15 DIAGNOSIS — Z13.0 ENCOUNTER FOR SCREENING FOR DISEASES OF THE BLOOD AND BLOOD-FORMING ORGANS AND CERTAIN DISORDERS INVOLVING THE IMMUNE MECHANISM: ICD-10-CM

## 2019-10-15 DIAGNOSIS — Z13.29 ENCOUNTER FOR SCREENING FOR OTHER SUSPECTED ENDOCRINE DISORDER: ICD-10-CM

## 2019-10-15 DIAGNOSIS — Z13.21 ENCOUNTER FOR SCREENING FOR OTHER SUSPECTED ENDOCRINE DISORDER: ICD-10-CM

## 2019-10-15 DIAGNOSIS — Z13.0 ENCOUNTER FOR SCREENING FOR OTHER SUSPECTED ENDOCRINE DISORDER: ICD-10-CM

## 2019-10-15 DIAGNOSIS — Z87.898 PERSONAL HISTORY OF OTHER SPECIFIED CONDITIONS: ICD-10-CM

## 2019-10-18 ENCOUNTER — APPOINTMENT (OUTPATIENT)
Dept: CARDIOLOGY | Facility: CLINIC | Age: 64
End: 2019-10-18
Payer: COMMERCIAL

## 2019-10-18 VITALS
HEART RATE: 50 BPM | SYSTOLIC BLOOD PRESSURE: 120 MMHG | DIASTOLIC BLOOD PRESSURE: 75 MMHG | HEIGHT: 61 IN | OXYGEN SATURATION: 95 % | WEIGHT: 232 LBS | BODY MASS INDEX: 43.8 KG/M2

## 2019-10-18 DIAGNOSIS — Z01.810 ENCOUNTER FOR PREPROCEDURAL CARDIOVASCULAR EXAMINATION: ICD-10-CM

## 2019-10-18 PROCEDURE — 93000 ELECTROCARDIOGRAM COMPLETE: CPT

## 2019-10-18 PROCEDURE — 99214 OFFICE O/P EST MOD 30 MIN: CPT

## 2019-10-18 NOTE — PHYSICAL EXAM
[General Appearance - Well Developed] : well developed [Normal Conjunctiva] : the conjunctiva exhibited no abnormalities [Normal Oral Mucosa] : normal oral mucosa [] : no respiratory distress [Respiration, Rhythm And Depth] : normal respiratory rhythm and effort [Exaggerated Use Of Accessory Muscles For Inspiration] : no accessory muscle use [Auscultation Breath Sounds / Voice Sounds] : lungs were clear to auscultation bilaterally [Chest Palpation] : palpation of the chest revealed no abnormalities [Lungs Percussion] : the lungs were normal to percussion [Heart Rate And Rhythm] : heart rate and rhythm were normal [Edema] : no peripheral edema present [Heart Sounds] : normal S1 and S2 [Systolic grade ___/6] : A grade [unfilled]/6 systolic murmur was heard. [Veins - Varicosity Changes] : no varicosital changes were noted in the lower extremities [FreeTextEntry1] : MSC [Abdomen Soft] : soft [Bowel Sounds] : normal bowel sounds [Abdomen Mass (___ Cm)] : no abdominal mass palpated [Nail Clubbing] : no clubbing of the fingernails [Abnormal Walk] : normal gait [Cyanosis, Localized] : no localized cyanosis [Skin Turgor] : normal skin turgor [Normal Appearance] : was normal in appearance [Oriented To Time, Place, And Person] : oriented to person, place, and time [Neck Supple] : was supple

## 2019-10-18 NOTE — REASON FOR VISIT
[Follow-Up - Clinic] : a clinic follow-up of [Hyperlipidemia] : hyperlipidemia [Medication Management] : Medication management [Mitral Regurgitation] : mitral regurgitation [Palpitations] : palpitations [Supraventricular Tachycardia] : supraventricular tachycardia [FreeTextEntry1] : Morbid obesity , pre op

## 2019-10-18 NOTE — HISTORY OF PRESENT ILLNESS
[FreeTextEntry1] : Patient with morbid obesity , MVP PSVT. bipolar disorder, sleep apnea not compliant to CPAP, hypothyroidism  DM came for pre op evaluation for bariatric surgery , says she is doing well ,\par \par Patient denies any new complaints , her cholesterol is controlled as well as her sugar   HB A1c 6.5 as per patient \par \par Patient had normal nuclear perfusion scan   \par Patient says her bipolar is under control .Patient is not complaint to CPAP machine \par \par \par Patient was placed on medication for bipolar since February 2013 \par \par \par

## 2019-10-18 NOTE — DISCUSSION/SUMMARY
[Anxiety] : anxiety disorder NOS [Paroxysmal SVT] : paroxysmal supraventricular tachycardia [Patient] : the patient [Paroxysmal Atrial Tachycardia] : paroxysmal atrial tachycardia [None] : none [Stable] : stable

## 2019-10-18 NOTE — REVIEW OF SYSTEMS
[Fever] : no fever [Chills] : no chills [Blurry Vision] : no blurred vision [Earache] : no earache [Mouth Sores] : no mouth sores [Shortness Of Breath] : no shortness of breath [Dyspnea on exertion] : not dyspnea during exertion [Chest  Pressure] : no chest pressure [Chest Pain] : no chest pain [Lower Ext Edema] : no extremity edema [Cough] : no cough [Leg Claudication] : no intermittent leg claudication [see HPI] : see HPI [Wheezing] : no wheezing [Joint Pain] : no joint pain [Incontinence] : no incontinence

## 2019-10-21 ENCOUNTER — MEDICATION RENEWAL (OUTPATIENT)
Age: 64
End: 2019-10-21

## 2019-10-21 ENCOUNTER — RX RENEWAL (OUTPATIENT)
Age: 64
End: 2019-10-21

## 2019-10-28 ENCOUNTER — APPOINTMENT (OUTPATIENT)
Dept: BARIATRICS | Facility: CLINIC | Age: 64
End: 2019-10-28
Payer: COMMERCIAL

## 2019-10-28 VITALS
OXYGEN SATURATION: 98 % | WEIGHT: 227.73 LBS | HEART RATE: 58 BPM | DIASTOLIC BLOOD PRESSURE: 78 MMHG | BODY MASS INDEX: 43 KG/M2 | HEIGHT: 61 IN | SYSTOLIC BLOOD PRESSURE: 122 MMHG

## 2019-10-28 DIAGNOSIS — Z86.69 PERSONAL HISTORY OF OTHER DISEASES OF THE NERVOUS SYSTEM AND SENSE ORGANS: ICD-10-CM

## 2019-10-28 PROCEDURE — 99214 OFFICE O/P EST MOD 30 MIN: CPT

## 2019-10-28 RX ORDER — OFLOXACIN 3 MG/ML
0.3 SOLUTION/ DROPS OPHTHALMIC
Qty: 5 | Refills: 0 | Status: DISCONTINUED | COMMUNITY
Start: 2019-06-22

## 2019-10-28 NOTE — PHYSICAL EXAM
[Obese, well nourished, in no acute distress] : obese, well nourished, in no acute distress [Normal] : affect appropriate [de-identified] : EOMI, anicteric  [de-identified] : obese, soft, nontender, no evidence of hernia.

## 2019-10-28 NOTE — ASSESSMENT
[FreeTextEntry1] : 64 year old female with longstanding history of obesity completed workup for laparoscopic sleeve gastrectomy and is ready to be scheduled for laparoscopic sleeve gastrectomy. Patient was encouraged to lose weight prior to surgery. Patient will be on preoperative modified liquid diet.  Nutrition and exercise guidelines were reviewed with the patient. Patient will attend preoperative education class. Procedure risks and benefits were again discussed with patient. All questions were answered.\par \par Schedule surgery date\par Two-week preoperative modified liquid diet and full liquids two days prior to surgery\par PST and Medical clearance\par Preoperative education class\par Call if any questions or concerns.

## 2019-10-28 NOTE — REVIEW OF SYSTEMS
[Recent Change In Weight] : ~T recent weight change [Negative] : Endocrine [Fever] : no fever [Chills] : no chills [Dysphagia] : no dysphagia [Chest Pain] : no chest pain [Palpitations] : no palpitations [Shortness Of Breath] : no shortness of breath [Wheezing] : no wheezing [Abdominal Pain] : no abdominal pain [Vomiting] : no vomiting [Constipation] : no constipation [Diarrhea] : no diarrhea [Reflux/Heartburn] : no reflux/ heartburn [FreeTextEntry2] : weight loss since last visit.

## 2019-11-04 ENCOUNTER — OUTPATIENT (OUTPATIENT)
Dept: OUTPATIENT SERVICES | Facility: HOSPITAL | Age: 64
LOS: 1 days | End: 2019-11-04
Payer: COMMERCIAL

## 2019-11-04 VITALS
WEIGHT: 222.01 LBS | DIASTOLIC BLOOD PRESSURE: 83 MMHG | HEIGHT: 61 IN | SYSTOLIC BLOOD PRESSURE: 152 MMHG | OXYGEN SATURATION: 98 % | TEMPERATURE: 98 F

## 2019-11-04 DIAGNOSIS — E66.01 MORBID (SEVERE) OBESITY DUE TO EXCESS CALORIES: ICD-10-CM

## 2019-11-04 DIAGNOSIS — Z01.818 ENCOUNTER FOR OTHER PREPROCEDURAL EXAMINATION: ICD-10-CM

## 2019-11-04 DIAGNOSIS — Z98.51 TUBAL LIGATION STATUS: Chronic | ICD-10-CM

## 2019-11-04 DIAGNOSIS — E11.9 TYPE 2 DIABETES MELLITUS WITHOUT COMPLICATIONS: ICD-10-CM

## 2019-11-04 DIAGNOSIS — G47.33 OBSTRUCTIVE SLEEP APNEA (ADULT) (PEDIATRIC): ICD-10-CM

## 2019-11-04 LAB
ANION GAP SERPL CALC-SCNC: 7 MMOL/L — SIGNIFICANT CHANGE UP (ref 5–17)
BLD GP AB SCN SERPL QL: SIGNIFICANT CHANGE UP
BUN SERPL-MCNC: 21 MG/DL — SIGNIFICANT CHANGE UP (ref 7–23)
CALCIUM SERPL-MCNC: 9.9 MG/DL — SIGNIFICANT CHANGE UP (ref 8.4–10.5)
CHLORIDE SERPL-SCNC: 104 MMOL/L — SIGNIFICANT CHANGE UP (ref 96–108)
CO2 SERPL-SCNC: 27 MMOL/L — SIGNIFICANT CHANGE UP (ref 22–31)
CREAT SERPL-MCNC: 0.81 MG/DL — SIGNIFICANT CHANGE UP (ref 0.5–1.3)
GLUCOSE SERPL-MCNC: 135 MG/DL — HIGH (ref 70–99)
HBA1C BLD-MCNC: 6.3 % — HIGH (ref 4–5.6)
HCT VFR BLD CALC: 43.4 % — SIGNIFICANT CHANGE UP (ref 34.5–45)
HGB BLD-MCNC: 14 G/DL — SIGNIFICANT CHANGE UP (ref 11.5–15.5)
MCHC RBC-ENTMCNC: 28.3 PG — SIGNIFICANT CHANGE UP (ref 27–34)
MCHC RBC-ENTMCNC: 32.3 GM/DL — SIGNIFICANT CHANGE UP (ref 32–36)
MCV RBC AUTO: 87.7 FL — SIGNIFICANT CHANGE UP (ref 80–100)
NRBC # BLD: 0 /100 WBCS — SIGNIFICANT CHANGE UP (ref 0–0)
PLATELET # BLD AUTO: 241 K/UL — SIGNIFICANT CHANGE UP (ref 150–400)
POTASSIUM SERPL-MCNC: 4.2 MMOL/L — SIGNIFICANT CHANGE UP (ref 3.5–5.3)
POTASSIUM SERPL-SCNC: 4.2 MMOL/L — SIGNIFICANT CHANGE UP (ref 3.5–5.3)
RBC # BLD: 4.95 M/UL — SIGNIFICANT CHANGE UP (ref 3.8–5.2)
RBC # FLD: 12.6 % — SIGNIFICANT CHANGE UP (ref 10.3–14.5)
SODIUM SERPL-SCNC: 138 MMOL/L — SIGNIFICANT CHANGE UP (ref 135–145)
WBC # BLD: 5.89 K/UL — SIGNIFICANT CHANGE UP (ref 3.8–10.5)
WBC # FLD AUTO: 5.89 K/UL — SIGNIFICANT CHANGE UP (ref 3.8–10.5)

## 2019-11-04 PROCEDURE — G0463: CPT

## 2019-11-04 RX ORDER — GLUCAGON INJECTION, SOLUTION 0.5 MG/.1ML
1 INJECTION, SOLUTION SUBCUTANEOUS ONCE
Refills: 0 | Status: DISCONTINUED | OUTPATIENT
Start: 2019-11-13 | End: 2019-11-14

## 2019-11-04 RX ORDER — SODIUM CHLORIDE 9 MG/ML
1000 INJECTION, SOLUTION INTRAVENOUS
Refills: 0 | Status: DISCONTINUED | OUTPATIENT
Start: 2019-11-13 | End: 2019-11-14

## 2019-11-04 RX ORDER — DEXTROSE 10 % IN WATER 10 %
125 INTRAVENOUS SOLUTION INTRAVENOUS ONCE
Refills: 0 | Status: DISCONTINUED | OUTPATIENT
Start: 2019-11-13 | End: 2019-11-14

## 2019-11-04 RX ORDER — GABAPENTIN 400 MG/1
2 CAPSULE ORAL
Qty: 0 | Refills: 0 | DISCHARGE

## 2019-11-04 RX ORDER — DEXTROSE 10 % IN WATER 10 %
250 INTRAVENOUS SOLUTION INTRAVENOUS ONCE
Refills: 0 | Status: DISCONTINUED | OUTPATIENT
Start: 2019-11-13 | End: 2019-11-14

## 2019-11-04 RX ORDER — DEXTROSE 50 % IN WATER 50 %
15 SYRINGE (ML) INTRAVENOUS ONCE
Refills: 0 | Status: DISCONTINUED | OUTPATIENT
Start: 2019-11-13 | End: 2019-11-14

## 2019-11-04 NOTE — H&P PST ADULT - NSICDXPROBLEM_GEN_ALL_CORE_FT
PROBLEM DIAGNOSES  Problem: Severe obesity (BMI >= 40)  Assessment and Plan: laproscopic sleeve gastrectomy on 11/13/19  medical clearance  pre op instructions provided

## 2019-11-04 NOTE — H&P PST ADULT - NSICDXPASTMEDICALHX_GEN_ALL_CORE_FT
PAST MEDICAL HISTORY:  Anxiety     Diabetes mellitus, type 2     H/O insomnia     Hypercholesteremia     IBS (Irritable Bowel Syndrome)     Mild Depression     Mitral Valve Prolapse     Obesity, morbid, BMI 40.0-49.9     PRESTON (Obstructive Sleep Apnea) cpap at night    Sustained Supraventricular Tachycardia on meds

## 2019-11-12 RX ORDER — SODIUM CHLORIDE 9 MG/ML
1000 INJECTION, SOLUTION INTRAVENOUS
Refills: 0 | Status: DISCONTINUED | OUTPATIENT
Start: 2019-11-13 | End: 2019-11-14

## 2019-11-12 RX ORDER — HYOSCYAMINE SULFATE 0.13 MG
0.12 TABLET ORAL EVERY 6 HOURS
Refills: 0 | Status: DISCONTINUED | OUTPATIENT
Start: 2019-11-13 | End: 2019-11-14

## 2019-11-12 RX ORDER — HYDROMORPHONE HYDROCHLORIDE 2 MG/ML
0.5 INJECTION INTRAMUSCULAR; INTRAVENOUS; SUBCUTANEOUS EVERY 4 HOURS
Refills: 0 | Status: DISCONTINUED | OUTPATIENT
Start: 2019-11-13 | End: 2019-11-14

## 2019-11-12 RX ORDER — DEXTROSE 50 % IN WATER 50 %
12.5 SYRINGE (ML) INTRAVENOUS ONCE
Refills: 0 | Status: DISCONTINUED | OUTPATIENT
Start: 2019-11-13 | End: 2019-11-14

## 2019-11-12 RX ORDER — GLUCAGON INJECTION, SOLUTION 0.5 MG/.1ML
1 INJECTION, SOLUTION SUBCUTANEOUS ONCE
Refills: 0 | Status: DISCONTINUED | OUTPATIENT
Start: 2019-11-13 | End: 2019-11-14

## 2019-11-12 RX ORDER — DEXTROSE 50 % IN WATER 50 %
25 SYRINGE (ML) INTRAVENOUS ONCE
Refills: 0 | Status: DISCONTINUED | OUTPATIENT
Start: 2019-11-13 | End: 2019-11-14

## 2019-11-12 RX ORDER — SODIUM CHLORIDE 9 MG/ML
2000 INJECTION, SOLUTION INTRAVENOUS
Refills: 0 | Status: DISCONTINUED | OUTPATIENT
Start: 2019-11-13 | End: 2019-11-14

## 2019-11-12 RX ORDER — INSULIN LISPRO 100/ML
VIAL (ML) SUBCUTANEOUS AT BEDTIME
Refills: 0 | Status: DISCONTINUED | OUTPATIENT
Start: 2019-11-13 | End: 2019-11-14

## 2019-11-12 RX ORDER — ENOXAPARIN SODIUM 100 MG/ML
40 INJECTION SUBCUTANEOUS EVERY 12 HOURS
Refills: 0 | Status: DISCONTINUED | OUTPATIENT
Start: 2019-11-13 | End: 2019-11-14

## 2019-11-12 RX ORDER — DEXTROSE 50 % IN WATER 50 %
15 SYRINGE (ML) INTRAVENOUS ONCE
Refills: 0 | Status: DISCONTINUED | OUTPATIENT
Start: 2019-11-13 | End: 2019-11-14

## 2019-11-12 RX ORDER — ONDANSETRON 8 MG/1
4 TABLET, FILM COATED ORAL EVERY 6 HOURS
Refills: 0 | Status: DISCONTINUED | OUTPATIENT
Start: 2019-11-13 | End: 2019-11-14

## 2019-11-12 RX ORDER — PANTOPRAZOLE SODIUM 20 MG/1
40 TABLET, DELAYED RELEASE ORAL DAILY
Refills: 0 | Status: DISCONTINUED | OUTPATIENT
Start: 2019-11-13 | End: 2019-11-14

## 2019-11-12 RX ORDER — INSULIN LISPRO 100/ML
VIAL (ML) SUBCUTANEOUS
Refills: 0 | Status: DISCONTINUED | OUTPATIENT
Start: 2019-11-13 | End: 2019-11-14

## 2019-11-13 ENCOUNTER — TRANSCRIPTION ENCOUNTER (OUTPATIENT)
Age: 64
End: 2019-11-13

## 2019-11-13 ENCOUNTER — RESULT REVIEW (OUTPATIENT)
Age: 64
End: 2019-11-13

## 2019-11-13 ENCOUNTER — APPOINTMENT (OUTPATIENT)
Dept: BARIATRICS | Facility: HOSPITAL | Age: 64
End: 2019-11-13
Payer: COMMERCIAL

## 2019-11-13 ENCOUNTER — INPATIENT (INPATIENT)
Facility: HOSPITAL | Age: 64
LOS: 0 days | Discharge: HOME | DRG: 621 | End: 2019-11-14
Attending: SURGERY | Admitting: SURGERY
Payer: COMMERCIAL

## 2019-11-13 VITALS
OXYGEN SATURATION: 96 % | RESPIRATION RATE: 13 BRPM | SYSTOLIC BLOOD PRESSURE: 111 MMHG | DIASTOLIC BLOOD PRESSURE: 60 MMHG | HEART RATE: 50 BPM | HEIGHT: 62 IN | TEMPERATURE: 99 F | WEIGHT: 216.71 LBS

## 2019-11-13 DIAGNOSIS — E66.01 MORBID (SEVERE) OBESITY DUE TO EXCESS CALORIES: ICD-10-CM

## 2019-11-13 DIAGNOSIS — G47.33 OBSTRUCTIVE SLEEP APNEA (ADULT) (PEDIATRIC): ICD-10-CM

## 2019-11-13 DIAGNOSIS — Z98.51 TUBAL LIGATION STATUS: Chronic | ICD-10-CM

## 2019-11-13 DIAGNOSIS — Z01.818 ENCOUNTER FOR OTHER PREPROCEDURAL EXAMINATION: ICD-10-CM

## 2019-11-13 DIAGNOSIS — E11.9 TYPE 2 DIABETES MELLITUS WITHOUT COMPLICATIONS: ICD-10-CM

## 2019-11-13 LAB
GLUCOSE BLDC GLUCOMTR-MCNC: 121 MG/DL — HIGH (ref 70–99)
GLUCOSE BLDC GLUCOMTR-MCNC: 135 MG/DL — HIGH (ref 70–99)
GLUCOSE BLDC GLUCOMTR-MCNC: 75 MG/DL — SIGNIFICANT CHANGE UP (ref 70–99)

## 2019-11-13 PROCEDURE — 43281 LAP PARAESOPHAG HERN REPAIR: CPT | Mod: AS,59

## 2019-11-13 PROCEDURE — 43281 LAP PARAESOPHAG HERN REPAIR: CPT | Mod: 59

## 2019-11-13 PROCEDURE — 88305 TISSUE EXAM BY PATHOLOGIST: CPT | Mod: 26

## 2019-11-13 PROCEDURE — 43775 LAP SLEEVE GASTRECTOMY: CPT

## 2019-11-13 PROCEDURE — 43775 LAP SLEEVE GASTRECTOMY: CPT | Mod: AS

## 2019-11-13 RX ORDER — ENOXAPARIN SODIUM 100 MG/ML
40 INJECTION SUBCUTANEOUS ONCE
Refills: 0 | Status: COMPLETED | OUTPATIENT
Start: 2019-11-13 | End: 2019-11-13

## 2019-11-13 RX ORDER — SODIUM CHLORIDE 9 MG/ML
1000 INJECTION, SOLUTION INTRAVENOUS
Refills: 0 | Status: DISCONTINUED | OUTPATIENT
Start: 2019-11-13 | End: 2019-11-13

## 2019-11-13 RX ORDER — CHLORHEXIDINE GLUCONATE 213 G/1000ML
1 SOLUTION TOPICAL ONCE
Refills: 0 | Status: COMPLETED | OUTPATIENT
Start: 2019-11-13 | End: 2019-11-13

## 2019-11-13 RX ORDER — ONDANSETRON 8 MG/1
4 TABLET, FILM COATED ORAL ONCE
Refills: 0 | Status: COMPLETED | OUTPATIENT
Start: 2019-11-13 | End: 2019-11-13

## 2019-11-13 RX ORDER — IBUPROFEN 200 MG
800 TABLET ORAL EVERY 6 HOURS
Refills: 0 | Status: DISCONTINUED | OUTPATIENT
Start: 2019-11-13 | End: 2019-11-14

## 2019-11-13 RX ORDER — HYDROMORPHONE HYDROCHLORIDE 2 MG/ML
0.5 INJECTION INTRAMUSCULAR; INTRAVENOUS; SUBCUTANEOUS
Refills: 0 | Status: DISCONTINUED | OUTPATIENT
Start: 2019-11-13 | End: 2019-11-13

## 2019-11-13 RX ORDER — ACETAMINOPHEN 500 MG
1000 TABLET ORAL EVERY 6 HOURS
Refills: 0 | Status: COMPLETED | OUTPATIENT
Start: 2019-11-13 | End: 2019-11-14

## 2019-11-13 RX ORDER — ACETAMINOPHEN 500 MG
1000 TABLET ORAL EVERY 6 HOURS
Refills: 0 | Status: DISCONTINUED | OUTPATIENT
Start: 2019-11-14 | End: 2019-11-14

## 2019-11-13 RX ORDER — APREPITANT 80 MG/1
40 CAPSULE ORAL ONCE
Refills: 0 | Status: COMPLETED | OUTPATIENT
Start: 2019-11-13 | End: 2019-11-13

## 2019-11-13 RX ORDER — CEFAZOLIN SODIUM 1 G
2000 VIAL (EA) INJECTION ONCE
Refills: 0 | Status: COMPLETED | OUTPATIENT
Start: 2019-11-13 | End: 2019-11-13

## 2019-11-13 RX ORDER — ACETAMINOPHEN 500 MG
1000 TABLET ORAL ONCE
Refills: 0 | Status: DISCONTINUED | OUTPATIENT
Start: 2019-11-13 | End: 2019-11-13

## 2019-11-13 RX ADMIN — ENOXAPARIN SODIUM 40 MILLIGRAM(S): 100 INJECTION SUBCUTANEOUS at 09:47

## 2019-11-13 RX ADMIN — ENOXAPARIN SODIUM 40 MILLIGRAM(S): 100 INJECTION SUBCUTANEOUS at 21:06

## 2019-11-13 RX ADMIN — CHLORHEXIDINE GLUCONATE 1 APPLICATION(S): 213 SOLUTION TOPICAL at 09:47

## 2019-11-13 RX ADMIN — Medication 516 MILLIGRAM(S): at 14:00

## 2019-11-13 RX ADMIN — SODIUM CHLORIDE 100 MILLILITER(S): 9 INJECTION, SOLUTION INTRAVENOUS at 13:54

## 2019-11-13 RX ADMIN — HYDROMORPHONE HYDROCHLORIDE 0.5 MILLIGRAM(S): 2 INJECTION INTRAMUSCULAR; INTRAVENOUS; SUBCUTANEOUS at 14:15

## 2019-11-13 RX ADMIN — SODIUM CHLORIDE 150 MILLILITER(S): 9 INJECTION, SOLUTION INTRAVENOUS at 22:33

## 2019-11-13 RX ADMIN — APREPITANT 40 MILLIGRAM(S): 80 CAPSULE ORAL at 09:46

## 2019-11-13 RX ADMIN — ONDANSETRON 4 MILLIGRAM(S): 8 TABLET, FILM COATED ORAL at 14:00

## 2019-11-13 RX ADMIN — Medication 0.12 MILLIGRAM(S): at 19:16

## 2019-11-13 RX ADMIN — Medication 1000 MILLIGRAM(S): at 19:45

## 2019-11-13 RX ADMIN — Medication 800 MILLIGRAM(S): at 14:30

## 2019-11-13 RX ADMIN — Medication 400 MILLIGRAM(S): at 19:16

## 2019-11-13 RX ADMIN — HYDROMORPHONE HYDROCHLORIDE 0.5 MILLIGRAM(S): 2 INJECTION INTRAMUSCULAR; INTRAVENOUS; SUBCUTANEOUS at 13:59

## 2019-11-13 NOTE — CONSULT NOTE ADULT - PROBLEM SELECTOR RECOMMENDATION 9
post op  sleeve gastrectomy  alert and awake  medical supportive care  CPAP for PRESTON - discussed settings and night time use - sleep hygiene discussed  pulse ox monitoring  I dheeraj  dvt p  ambulate  will follow  discussed with pt and  at the bedside

## 2019-11-13 NOTE — BRIEF OPERATIVE NOTE - NSICDXBRIEFPROCEDURE_GEN_ALL_CORE_FT
PROCEDURES:  Laparoscopic sleeve gastrectomy with laparoscopic repair of hiatal hernia 13-Nov-2019 13:12:43 intra-op EGD Umu Hogue

## 2019-11-13 NOTE — DISCHARGE NOTE PROVIDER - HOSPITAL COURSE
63 yo M F with PMHx of morbid obesity admitted to [facility] for scheduled laparoscopic sleeve gastrectomy and intra-operative EGD and hiatal hernia repair.  Post operatively patient did well, good urine output and ambulating well on floor. Pt advanced to bariatric clears which she tolerated . Nutritional guidelines were reviewed with the nutritionist. Patient felt ready for discharge to home. Pt instructed to drink small frequent amounts, start protein drinks and follow dietary guidelines. Instructed to ambulate and use incentive spirometry frequently. Pt to follow up with Dr. Granado in 1 week and call with any questions or concerns. 65 yo M F with PMHx of morbid obesity admitted to [facility] for scheduled laparoscopic sleeve gastrectomy and intra-operative EGD and hiatal hernia repair. Painter Catheter was removed on post op day # 1.  Post operatively patient did well, good urine output and ambulating well on floor. Pt advanced to bariatric clears which she tolerated . Nutritional guidelines were reviewed with the nutritionist. Patient felt ready for discharge to home. Pt instructed to drink small frequent amounts, start protein drinks and follow dietary guidelines. Instructed to ambulate and use incentive spirometry frequently. Pt to follow up with Dr. Granado in 1 week and call with any questions or concerns.

## 2019-11-13 NOTE — BRIEF OPERATIVE NOTE - NSICDXBRIEFPOSTOP_GEN_ALL_CORE_FT
POST-OP DIAGNOSIS:  Hiatal hernia 13-Nov-2019 13:13:25  Umu Hogue  Morbid obesity 13-Nov-2019 13:13:09  Umu Hogue

## 2019-11-13 NOTE — PATIENT PROFILE ADULT - NSASFUNCLEVELADLTOILET_GEN_A_NUR
[facility]    [address] [address]    Phone:  [phone]    Fax:  [phone]       Thank You for choosing us for your health care visit. We are glad to serve you and happy to provide you with this summary of your visit. Please help us to ensure we have accurate records. If you find anything that needs to be changed, please let our staff know as soon as possible.          Your Demographic Information     Patient Name Sex     Oren Caceres Male 1980       Ethnic Group Patient Race    Not of  or  Origin White      Your Visit Details     Date & Time Provider Department    3/30/2017 11:35 AM JOSE Cerda [facility]      Your Upcoming Appointment*(Max 10)     2017  9:30 AM CDT   [facility]   [facility])    [address] [address]   [phone]              1:30 PM CDT   Lab Visit with [facility]   [facility])    [address] [facility] [address] [phone]              1:45 PM CDT   Follow-up Visit with Ruma Cohen MD   [facility] ([facility])    [address] [facility] [address]   [phone]            2017  9:30 AM CDT   MRI BRAIN COMBO with [facility] MRI 2   [facility] - MRI Scan ([facility])    [address]  [facility] [address]   [phone]            2017  1:00 PM CDT   Lab Visit with [facility]   [facility] ([facility])    [address]  [facility] [address]   [phone]          Wednesday July 12, 2017  1:30 PM CDT   Follow-up Visit with Sathya Tomas MD   [facility] ([facility])    [address]  [address]  [facility] [id number] [phone]              Your To Do List     Follow-Up    Return for PRN for lasting or worsening symptoms.      Conditions Discussed Today or Order-Related Diagnoses        Comments    Acute non-recurrent pansinusitis    -  Primary       Your Vitals Were     BP Pulse Temp Resp Height    113/79 (BP Location: [facility] Patient Position: Sitting, Cuff Size: Large Adult) 84 97.5 °F (36.4 °C) (Temporal Artery) 16 5' 8.5\" (1.74 m)    Weight SpO2 BMI Smoking Status       190 lb 4.1 oz (86.3 kg) 98% 28.51 kg/m2 Never Smoker       Medications Prescribed or Re-Ordered Today     amoxicillin-clavulanate (AUGMENTIN) 875-125 MG per tablet    Sig - Route: Take 1 tablet by mouth 2 times daily for 10 days. - Oral    Class: Eprescribe    Pharmacy: [facility] [address] [place] [address] AT [address] [place] [address] & [facility] #: [phone]      Your Current Medications Are        Disp Refills Start End    amoxicillin-clavulanate (AUGMENTIN) 875-125 MG per tablet 20 tablet 0 3/30/2017 4/9/2017    Sig - Route: Take 1 tablet by mouth 2 times daily for 10 days. - Oral    Class: Eprescribe    ALPRAZolam (XANAX) 0.25 MG tablet 30 tablet 0 3/8/2017     Sig: Take 1 tablet every 4-6 hours for anxiety or pseudo seizures    Cosign for Ordering: Accepted by Sathya Tomas MD on 3/8/2017 11:31 AM    levETIRAcetam (KEPPRA) 250 MG tablet 1080 tablet 6 3/2/2017     Sig: TAKE 5 TABLETS BY MOUTH TWICE DAILY    Class: Eprescribe    Notes to Pharmacy: **Patient requests 90 days supply**    escitalopram (LEXAPRO) 10 MG tablet 60 tablet 6 3/1/2017     Sig - Route: Take 1.5 tablets by mouth nightly. 1 and 1/2 tab (=15mg) - Oral    Class: Eprescribe    temozolomide (TEMODAR) 140 MG capsule 10 capsule 4 2/16/2017     Sig: Take 2 capsule (total  daily dose 280mg) nightly every 3rd Monday every other month starting 3/20/17.    Class: Eprescribe    metoCLOPramide (REGLAN) 10 MG tablet 30 tablet 6 2/8/2017     Sig - Route: Take 1 tablet by mouth 4 times daily. Take for the 5 days of chemotherapy and 2 days after completion of the temodar treatment every month - Oral    Class: Eprescribe    LORazepam (ATIVAN) 1 MG tablet 30 tablet 6 2/8/2017     Sig - Route: Take 1 tablet by mouth nightly. - Oral    ondansetron (ZOFRAN ODT) 8 MG disintegrating tablet 30 tablet 3 2/8/2017     Sig - Route: Take 1 tablet by mouth every 8 hours as needed for Nausea. - Oral    Class: Eprescribe    lamotrigine (LAMICTAL) 200 MG tablet 60 tablet 3 2/6/2017     Sig - Route: Take 1 tablet by mouth 2 times daily. - Oral    Class: Eprescribe    cyclobenzaprine (FLEXERIL) 5 MG tablet 30 tablet 0 6/2/2016     Sig - Route: Take 1-2 tablets by mouth 3 times daily as needed for Muscle spasms. - Oral    Class: Eprescribe    traZODONE (DESYREL) 50 MG tablet 30 tablet 6 4/26/2016     Sig - Route: Take 1 tablet by mouth nightly. - Oral    Class: Eprescribe    prochlorperazine (COMPAZINE) 10 MG tablet 60 tablet 3 4/18/2016     Sig - Route: Take 1 tablet by mouth every 6 hours as needed for Nausea or Vomiting. - Oral    Class: Eprescribe    acetaminophen 650 MG Tab   1/26/2016     Sig - Route: Take 650 mg by mouth every 4 hours as needed for Pain. - Oral    Class: OTC    docusate sodium-sennosides (SENOKOT S) 50-8.6 MG per tablet 30 tablet 0 1/10/2016     Sig - Route: Take 1 tablet by mouth 2 times daily as needed for Constipation. - Oral    Class: Eprescribe    omeprazole (PRILOSEC) 20 MG capsule        Sig - Route: Take 20 mg by mouth daily. - Oral    Class: Historical Med      Allergies     Dust Other (See Comments)    Coughing, sneezing, asthmatic symptoms    Seasonal Ic [Seasonal] Other (See Comments)    Coughing, sneezing, asthmatic symptoms      Immunizations History as of 3/30/2017     Name  Date    DTaP 7/15/1986, 12/7/1982, 2/16/1982, 1981, 1981    Hepatitis A - Adult 9/6/2013 12:55 PM    Hepatitis B Child 8/13/1996, 4/9/1996, 2/27/1996    INFLUENZA QUADRIVALENT 12/6/2015  1:06 PM    Influenza 11/9/2012, 10/30/2011, 12/13/2010    Influenza Quadrivalent Preservative Free 10/13/2016  2:13 PM    MMR 7/14/1992, 9/7/1982    Meningococcal Conjugate MCV4P (Menactra) 9/6/2013 12:55 PM    Polio, INACTIVATED 9/6/2013 12:55 PM    Polio, ORAL 7/15/1996, 12/7/1982, 2/16/1982, 1981, 1981    Td:Adult type tetanus/diphtheria 2/27/1996    Tdap 9/10/2008    Typhoid, Intramuscular 9/6/2013 12:55 PM    Yellow Fever 9/6/2013 12:55 PM      Problem List as of 3/30/2017     Bipolar I disorder    History of seizures    Hx of melanoma excision    Astrocytoma, grade II    Instability of left shoulder joint    Status post labral repair of shoulder 5/11/16              Patient Instructions    Take the antibiotic as directed. Take a probiotic (such as acidophilus or florigen) while taking this to prevent diarrhea and nausea.  Recommend William-Med Sinus Rinse system or other saline therapy twice daily for sinus drainage and congestion. Afrin Nasal Spray as directed on the package for 3 days only as longer will cause congestion versus improve congestion. Warm, moist compresses to the sinuses. Follow up as needed.               Acute Sinusitis    Acute sinusitis is inflammation (irritation and swelling) of the sinuses. It is usually from a bacterial infection that follows an upper respiratory viral infection. Your doctor can help you find relief. Read on to learn more.  What is acute sinusitis?  Sinuses are air-filled spaces in the skull behind the face. They are kept moist and clean by a lining of mucosa. Things such as pollen, smoke, and chemical fumes can irritate the mucosa. It can then become inflamed (swell up). As a response to irritation, the mucosa makes more mucus and other fluids. Tiny hairlike cilia  cover the mucosa. Cilia help transport mucus toward the opening of the sinus. Too much mucus may cause the cilia to stop working. This blocks the sinus opening. A buildup of fluid in the sinuses then leads to symptoms such as pain and pressure. It can also encourage growth of bacteria in the sinuses.  Common symptoms of acute sinusitis  You may have:  · Facial soreness pain  · Headache  · Fever  · Postnasal drip (drainage in the back of the throat)  · Congestion  · Drainage that is thick and colored, instead of clear  · Cough  Diagnosis of acute sinusitis  The doctor will ask about your symptoms and medical history. He or she will examine your ear, nose, and throat. X-rays are usually not needed. If your sinusitis recurs, you may have a culture to check for bacteria or imaging tests.     An evaluation will be done. A culture (sample of mucus) is sometimes taken to check for bacteria. If you have multiple bouts of sinusitis, imaging (X-rays or CAT scans) may be done to check for an anatomic cause of the infection.  Treatment of acute sinusitis  Treatment is designed to unblock the sinus opening and help the cilia work again. Antihistamine and decongestant medications may be prescribed. These can reduce inflammation and decrease fluid production. If a bacterial infection is present, it is treated with antibiotic medication for 10 to 14 days. This medication should be taken until it is gone, even if you feel better.  © 8458-5774 [employer]. [address] [place] [address]. All rights reserved. This information is not intended as a substitute for professional medical care. Always follow your healthcare professional's instructions.              MEDICATION: AUGMENTIN  Augmentin (generic: amoxicillin + clavulanate) is a penicillin-type antibiotic.  DIRECTIONS FOR USE:  Take Augmentin with food to avoid stomach upset.  Take the medicine at regular intervals. If the label says “every 8 hours”, this  means 3 times per day. Doses don't have to be exactly eight hours apart, but you should take three doses a day, in the morning, afternoon and at bedtime. Take all of the medicine until it is gone, even if you are feeling better. This will assure that the infection is fully treated.  WHAT TO WATCH FOR:  POSSIBLE SIDE EFFECTS: Nausea, diarrhea, anxiety, dizziness: Contact your doctor if any of these symptoms persist or become severe. White spots in the mouth (thrush), vaginal itching or discharge: Contact your doctor.  ALLERGIC REACTION: Rash, itching, swelling, trouble breathing or swallowing: Contact your doctor or return to this facility promptly.  MEDICAL CONDITIONS: Before starting this medicine, be sure your doctor knows if you have any of the following conditions:  · Allergic reaction to cephalosporin or penicillin-type drugs in the past  · Current infection with mononucleosis  · Breastfeeding  DRUG INTERACTION: Before starting this medicine, be sure your doctor knows if you are taking any of the following drugs:  · Allopurinol, Probenecid, methotrexate, birth control pills (see below)  WARNING:  · In rare cases, this medicine may block the effect of birth control pills. Therefore, to be safe, use another form of contraception during the menstrual cycle(s) in which you are taking this medicine.  · Do not drive, ride a bicycle or operate dangerous equipment while taking this medicine until you know how it will affect you.  [NOTE: This information topic may not include all directions, precautions, medical conditions, drug/food interactions and warnings for this drug. Check with your doctor, nurse, or pharmacist for any questions that you may have.]  © 1101-2501 [employer], [address] [place] [address], [place] [address]. All rights reserved. This information is not intended as a substitute for professional medical care. Always follow your healthcare professional's instructions.        0 = independent

## 2019-11-13 NOTE — CONSULT NOTE ADULT - SUBJECTIVE AND OBJECTIVE BOX
Date/Time Patient Seen:  		  Referring MD:   Data Reviewed	       Patient is a 64y old  Female who presents with a chief complaint of     Subjective/HPI    Pre-Op Diagnosis, Post-Op Diagnosis and Procedure:    Pre-Op, Post-Op and Procedure Selector:  ·  PRE-OP DIAGNOSIS:  Morbid obesity 13-Nov-2019 13:12:57  Umu Hogue  ·  POST-OP DIAGNOSIS:  Hiatal hernia 13-Nov-2019 13:13:25  Umu Hogue  Morbid obesity 13-Nov-2019 13:13:09  Umu Hogue  ·  PROCEDURES:  Laparoscopic sleeve gastrectomy with laparoscopic repair of hiatal hernia 13-Nov-2019 13:12:43 intra-op EGD Umu Hogue     Social History:  · Marital Status	  · Lives With	alone     Substance Use History:  · Substance Use Comment	quit     Alcohol Use History:  · Have you ever consumed alcohol	never     Tobacco Usage:  · Tobacco Usage: Never smoker    Presurgical Screening:    Cardiovascular:  · Energy expenditure (mets)	>5     Cardiac Tests:  · Last Stress Test	6/2019-no ischemia     Sleep Apnea Screening:  Confirmed Obstructive Sleep Apnea (PRESTON)?: Yes     Airway:  · Airway	normal  · Mallampati Score	Class II - visualization of the soft palate, fauces, and uvula     Anesthesia History:  · Previous Reaction to Anesthesia	none     Transfusion History:  · Blood Avoidance/Restrictions	none  · Previous Blood Transfusion	no    Core Measures/Disease Management:    Heart Failure:  Does this patient have a history of or has been diagnosed with heart failure? no.    Clinical Risk Assessment:    Sepsis:  · Does patient meet criteria for Sepsis	No     Catheterizations/Incisions/Drainage:  · Urinary Catheter	no  · Central Venous Catheter/PICC Line	no  · Surgical Site Incision	no  · Venous Thromboembolism	no  · Pulmonary Embolus	no  · Pressure Injury	no      VTE Risk Factor Assessment:   · Age	(2) 61-74 years  · BMI	(1) obesity (BMI greater than 25)  · History	(0) indicator not present  · Current Status	(0) indicator not present  · Current Labs/Test Results	none  · For Women Only	(0) indicator not present  · VTE Score	3     Hepatitis C Status:  · Hepatitis C Status	Negative     HIV Status:  · HIV Status: Negative/Unknown       Devices:  · Implants/Medical Devices	None    Health Management:    Health Management:  · Health Management/Screening	mammogram  · Last Mammogram	2018  · Results of Last Mammogram	normal  · Pap smear test done in past 3 years	yes     Obstetrical Information:  · Term Deliveries	2  · Living Children	2           PAST MEDICAL & SURGICAL HISTORY:  Bipolar 1 disorder  Obesity, morbid, BMI 40.0-49.9  H/O insomnia  Diabetes mellitus, type 2  PRESTON (Obstructive Sleep Apnea): cpap at night  IBS (Irritable Bowel Syndrome)  Hypercholesteremia  Anxiety  Mild Depression  Sustained Supraventricular Tachycardia: on meds  Mitral Valve Prolapse  H/O tubal ligation  Hernia: Right Inguinal  repair  History of Arthroscopy of Knee: Right  Fallen Bladder: Bladder Sling  Benign Tumor of Breast: right  S/P Dilation and Curettage        Medication list         MEDICATIONS  (STANDING):  acetaminophen  IVPB .. 1000 milliGRAM(s) IV Intermittent every 6 hours    MEDICATIONS  (PRN):         Vitals log        ICU Vital Signs Last 24 Hrs  T(C): 37.2 (13 Nov 2019 09:21), Max: 37.2 (13 Nov 2019 09:21)  T(F): 98.9 (13 Nov 2019 09:21), Max: 98.9 (13 Nov 2019 09:21)  HR: 50 (13 Nov 2019 09:21) (50 - 50)  BP: 111/60 (13 Nov 2019 09:21) (111/60 - 111/60)  BP(mean): --  ABP: --  ABP(mean): --  RR: 13 (13 Nov 2019 09:21) (13 - 13)  SpO2: 96% (13 Nov 2019 09:21) (96% - 96%)           Input and Output:  I&O's Detail      Lab Data                  Review of Systems	      Objective     Physical Examination        Pertinent Lab findings & Imaging      Painetr:  NO   Adequate UO     I&O's Detail           Discussed with:     Cultures:	        Radiology        Ventricular Rate 73 BPM    Atrial Rate 73 BPM    P-R Interval 132 ms    QRS Duration 76 ms    Q-T Interval 370 ms    QTC Calculation(Bezet) 407 ms    P Axis 48 degrees    R Axis 1 degrees    T Axis 7 degrees    Diagnosis Line Normal sinus rhythm  Normal ECG  When compared with ECG of 21-FEB-2013 23:22,  No significant change was found  Confirmed by DEMARCUS MUNOZ, CHRIS ([id number]) on 9/8/2017 6:34:47 PM Date/Time Patient Seen:  		  Referring MD:   Data Reviewed	       Patient is a 64y old  Female who presents with a chief complaint of     Subjective/HPI  in bed  seen and examined  vs and meds reviewed  labs reviewed  H and P reviewed  post op  has PRESTON  uses CPAP      Pre-Op Diagnosis, Post-Op Diagnosis and Procedure:    Pre-Op, Post-Op and Procedure Selector:  ·  PRE-OP DIAGNOSIS:  Morbid obesity 13-Nov-2019 13:12:57  Umu Hogue  ·  POST-OP DIAGNOSIS:  Hiatal hernia 13-Nov-2019 13:13:25  Umu Hogue  Morbid obesity 13-Nov-2019 13:13:09  Umu Hogue  ·  PROCEDURES:  Laparoscopic sleeve gastrectomy with laparoscopic repair of hiatal hernia 13-Nov-2019 13:12:43 intra-op EGD Umu Hogue     Social History:  · Marital Status	  · Lives With	alone     Substance Use History:  · Substance Use Comment	quit     Alcohol Use History:  · Have you ever consumed alcohol	never     Tobacco Usage:  · Tobacco Usage: Never smoker    Presurgical Screening:    Cardiovascular:  · Energy expenditure (mets)	>5     Cardiac Tests:  · Last Stress Test	6/2019-no ischemia     Sleep Apnea Screening:  Confirmed Obstructive Sleep Apnea (PRESTON)?: Yes     Airway:  · Airway	normal  · Mallampati Score	Class II - visualization of the soft palate, fauces, and uvula     Anesthesia History:  · Previous Reaction to Anesthesia	none     Transfusion History:  · Blood Avoidance/Restrictions	none  · Previous Blood Transfusion	no    Core Measures/Disease Management:    Heart Failure:  Does this patient have a history of or has been diagnosed with heart failure? no.    Clinical Risk Assessment:    Sepsis:  · Does patient meet criteria for Sepsis	No     Catheterizations/Incisions/Drainage:  · Urinary Catheter	no  · Central Venous Catheter/PICC Line	no  · Surgical Site Incision	no  · Venous Thromboembolism	no  · Pulmonary Embolus	no  · Pressure Injury	no      VTE Risk Factor Assessment:   · Age	(2) 61-74 years  · BMI	(1) obesity (BMI greater than 25)  · History	(0) indicator not present  · Current Status	(0) indicator not present  · Current Labs/Test Results	none  · For Women Only	(0) indicator not present  · VTE Score	3     Hepatitis C Status:  · Hepatitis C Status	Negative     HIV Status:  · HIV Status: Negative/Unknown       Devices:  · Implants/Medical Devices	None    Health Management:    Health Management:  · Health Management/Screening	mammogram  · Last Mammogram	2018  · Results of Last Mammogram	normal  · Pap smear test done in past 3 years	yes     Obstetrical Information:  · Term Deliveries	2  · Living Children	2           PAST MEDICAL & SURGICAL HISTORY:  Bipolar 1 disorder  Obesity, morbid, BMI 40.0-49.9  H/O insomnia  Diabetes mellitus, type 2  PRESTON (Obstructive Sleep Apnea): cpap at night  IBS (Irritable Bowel Syndrome)  Hypercholesteremia  Anxiety  Mild Depression  Sustained Supraventricular Tachycardia: on meds  Mitral Valve Prolapse  H/O tubal ligation  Hernia: Right Inguinal  repair  History of Arthroscopy of Knee: Right  Fallen Bladder: Bladder Sling  Benign Tumor of Breast: right  S/P Dilation and Curettage        Medication list         MEDICATIONS  (STANDING):  acetaminophen  IVPB .. 1000 milliGRAM(s) IV Intermittent every 6 hours    MEDICATIONS  (PRN):         Vitals log        ICU Vital Signs Last 24 Hrs  T(C): 37.2 (13 Nov 2019 09:21), Max: 37.2 (13 Nov 2019 09:21)  T(F): 98.9 (13 Nov 2019 09:21), Max: 98.9 (13 Nov 2019 09:21)  HR: 50 (13 Nov 2019 09:21) (50 - 50)  BP: 111/60 (13 Nov 2019 09:21) (111/60 - 111/60)  BP(mean): --  ABP: --  ABP(mean): --  RR: 13 (13 Nov 2019 09:21) (13 - 13)  SpO2: 96% (13 Nov 2019 09:21) (96% - 96%)           Input and Output:  I&O's Detail      Lab Data                  Review of Systems	  post op      Objective     Physical Examination  heart s1s2  lung dec BS  abd soft  cn grossly int  head nc  obese  alert  verbal      Pertinent Lab findings & Imaging      Painter:  NO   Adequate UO     I&O's Detail           Discussed with:     Cultures:	        Radiology        Ventricular Rate 73 BPM    Atrial Rate 73 BPM    P-R Interval 132 ms    QRS Duration 76 ms    Q-T Interval 370 ms    QTC Calculation(Bezet) 407 ms    P Axis 48 degrees    R Axis 1 degrees    T Axis 7 degrees    Diagnosis Line Normal sinus rhythm  Normal ECG  When compared with ECG of 21-FEB-2013 23:22,  No significant change was found  Confirmed by CHRIS TRACY MD ([id number]) on 9/8/2017 6:34:47 PM

## 2019-11-13 NOTE — DISCHARGE NOTE PROVIDER - REASON FOR ADMISSION
65 yo M F with PMHx of morbid obesity admitted to [facility] for scheduled laparoscopic sleeve gastrectomy and intra-operative EGD and hiatal hernia repair.

## 2019-11-13 NOTE — DISCHARGE NOTE PROVIDER - NSDCCPCAREPLAN_GEN_ALL_CORE_FT
PRINCIPAL DISCHARGE DIAGNOSIS  Diagnosis: Morbid obesity  Assessment and Plan of Treatment: Instructed to ambulate and use incentive spirometry frequently. Ice packs to abdominal wall and shoulders as needed for discomfort. Cont bariatric Continue Bariatric Clear Diet . Plan to start Protein shakes at home . Avoid long heat exposure -outdoors. and follow nutritional guidelines as instructed. Pain meds as needed by MD. Pt instructed  to follow up with Dr. Granado in 1 week.      SECONDARY DISCHARGE DIAGNOSES  Diagnosis: S/P laparoscopic sleeve gastrectomy  Assessment and Plan of Treatment: Instructed to ambulate and use incentive spirometry frequently. Ice packs to abdominal wall and shoulders as needed for discomfort. Cont bariatric Continue Bariatric Clear Diet . Plan to start Protein shakes at home . Avoid long heat exposure -outdoors. and follow nutritional guidelines as instructed. Pain meds as needed by MD. Pt instructed  to follow up with Dr. Granado in 1 week.

## 2019-11-13 NOTE — DISCHARGE NOTE PROVIDER - CARE PROVIDER_API CALL
Yessica Granado (MD)  Surgery  [address] [place] [address]  Phone: [phone]  Fax: [phone]  Follow Up Time:

## 2019-11-13 NOTE — DISCHARGE NOTE PROVIDER - NSDCMRMEDTOKEN_GEN_ALL_CORE_FT
atenolol 25 mg oral tablet: 1 tab(s) orally once a day  dilTIAZem 120 mg/24 hours oral capsule, extended release: 1 cap(s) orally once a day  lamoTRIgine 200 mg oral tablet: 1 tab(s) orally once a day (at bedtime)  Lipitor 20 mg oral tablet: 1 tab(s) orally once a day  Melatonin 10 mg oral tablet, disintegratin tab(s) orally once a day (at bedtime)  metFORMIN 500 mg oral tablet: 1 tab(s) orally once a day (before a meal)  Neurontin 100 mg oral capsule: 1 cap(s) orally once a day  Remeron 45 mg oral tablet: 1 tab(s) orally once a day (at bedtime) dilTIAZem 30 mg oral tablet: 1 tab(s) orally 3 times a day- 30 mg am 30 mg afternoon and 60 mg at night. script sent to local pharmacy.   lamoTRIgine 200 mg oral tablet: 1 tab(s) orally once a day (at bedtime)  Lipitor 20 mg oral tablet: 1 tab(s) orally once a day crush and put in low fat yogurt or pudding.   Melatonin 10 mg oral tablet, disintegratin tab(s) orally once a day (at bedtime)  metFORMIN 500 mg oral tablet: 1 tab(s) orally once a day (before a meal) crush and put in low fat yogurt or pudding.   Neurontin 100 mg oral capsule: 1 cap(s) orally once a day crush and put in low fat yogurt or pudding.   omeprazole 20 mg oral delayed release capsule: 1 cap(s) orally once a day open  and put in low fat yogurt or pudding.   ondansetron 4 mg oral tablet, disintegratin tab(s) orally 3 times a day as needed for nausea.   Percocet 5/325 oral tablet: 1 tab(s) orally every 6 hours as needed for moderate to sever pain crush and put in low fat yogurt or pudding.   Remeron 45 mg oral tablet: 1 tab(s) orally once a day (at bedtime) crush and put in low fat yogurt or pudding.

## 2019-11-13 NOTE — DISCHARGE NOTE PROVIDER - NSDCCPTREATMENT_GEN_ALL_CORE_FT
PRINCIPAL PROCEDURE  Procedure: Gastrectomy, sleeve, laparoscopic, with laparoscopic hiatal hernia repair  Findings and Treatment: Tolerated procedure well      SECONDARY PROCEDURE  Procedure: EGD  Findings and Treatment: Tolerated procedure well.

## 2019-11-14 ENCOUNTER — TRANSCRIPTION ENCOUNTER (OUTPATIENT)
Age: 64
End: 2019-11-14

## 2019-11-14 VITALS
OXYGEN SATURATION: 99 % | TEMPERATURE: 98 F | SYSTOLIC BLOOD PRESSURE: 129 MMHG | RESPIRATION RATE: 18 BRPM | DIASTOLIC BLOOD PRESSURE: 63 MMHG | HEART RATE: 52 BPM

## 2019-11-14 LAB
ANION GAP SERPL CALC-SCNC: 16 MMOL/L — SIGNIFICANT CHANGE UP (ref 5–17)
BUN SERPL-MCNC: 11 MG/DL — SIGNIFICANT CHANGE UP (ref 7–23)
CALCIUM SERPL-MCNC: 9.2 MG/DL — SIGNIFICANT CHANGE UP (ref 8.4–10.5)
CHLORIDE SERPL-SCNC: 104 MMOL/L — SIGNIFICANT CHANGE UP (ref 96–108)
CO2 SERPL-SCNC: 17 MMOL/L — LOW (ref 22–31)
CREAT SERPL-MCNC: 0.67 MG/DL — SIGNIFICANT CHANGE UP (ref 0.5–1.3)
GLUCOSE BLDC GLUCOMTR-MCNC: 107 MG/DL — HIGH (ref 70–99)
GLUCOSE BLDC GLUCOMTR-MCNC: 110 MG/DL — HIGH (ref 70–99)
GLUCOSE BLDC GLUCOMTR-MCNC: 124 MG/DL — HIGH (ref 70–99)
GLUCOSE BLDC GLUCOMTR-MCNC: 97 MG/DL — SIGNIFICANT CHANGE UP (ref 70–99)
GLUCOSE SERPL-MCNC: 134 MG/DL — HIGH (ref 70–99)
HCT VFR BLD CALC: 43.6 % — SIGNIFICANT CHANGE UP (ref 34.5–45)
HGB BLD-MCNC: 14.2 G/DL — SIGNIFICANT CHANGE UP (ref 11.5–15.5)
MCHC RBC-ENTMCNC: 28.6 PG — SIGNIFICANT CHANGE UP (ref 27–34)
MCHC RBC-ENTMCNC: 32.6 GM/DL — SIGNIFICANT CHANGE UP (ref 32–36)
MCV RBC AUTO: 87.7 FL — SIGNIFICANT CHANGE UP (ref 80–100)
NRBC # BLD: 0 /100 WBCS — SIGNIFICANT CHANGE UP (ref 0–0)
PLATELET # BLD AUTO: 263 K/UL — SIGNIFICANT CHANGE UP (ref 150–400)
POTASSIUM SERPL-MCNC: 4.6 MMOL/L — SIGNIFICANT CHANGE UP (ref 3.5–5.3)
POTASSIUM SERPL-SCNC: 4.6 MMOL/L — SIGNIFICANT CHANGE UP (ref 3.5–5.3)
RBC # BLD: 4.97 M/UL — SIGNIFICANT CHANGE UP (ref 3.8–5.2)
RBC # FLD: 12.6 % — SIGNIFICANT CHANGE UP (ref 10.3–14.5)
SODIUM SERPL-SCNC: 137 MMOL/L — SIGNIFICANT CHANGE UP (ref 135–145)
WBC # BLD: 9.02 K/UL — SIGNIFICANT CHANGE UP (ref 3.8–10.5)
WBC # FLD AUTO: 9.02 K/UL — SIGNIFICANT CHANGE UP (ref 3.8–10.5)

## 2019-11-14 PROCEDURE — 85027 COMPLETE CBC AUTOMATED: CPT

## 2019-11-14 PROCEDURE — 80048 BASIC METABOLIC PNL TOTAL CA: CPT

## 2019-11-14 PROCEDURE — 82962 GLUCOSE BLOOD TEST: CPT

## 2019-11-14 PROCEDURE — 88305 TISSUE EXAM BY PATHOLOGIST: CPT

## 2019-11-14 PROCEDURE — C1889: CPT

## 2019-11-14 PROCEDURE — 94660 CPAP INITIATION&MGMT: CPT

## 2019-11-14 RX ORDER — DILTIAZEM HCL 120 MG
60 CAPSULE, EXT RELEASE 24 HR ORAL
Refills: 0 | Status: DISCONTINUED | OUTPATIENT
Start: 2019-11-14 | End: 2019-11-14

## 2019-11-14 RX ORDER — DILTIAZEM HCL 120 MG
30 CAPSULE, EXT RELEASE 24 HR ORAL
Refills: 0 | Status: DISCONTINUED | OUTPATIENT
Start: 2019-11-14 | End: 2019-11-14

## 2019-11-14 RX ORDER — DILTIAZEM HCL 120 MG
30 CAPSULE, EXT RELEASE 24 HR ORAL THREE TIMES A DAY
Refills: 0 | Status: DISCONTINUED | OUTPATIENT
Start: 2019-11-14 | End: 2019-11-14

## 2019-11-14 RX ORDER — ATORVASTATIN CALCIUM 80 MG/1
1 TABLET, FILM COATED ORAL
Qty: 0 | Refills: 0 | DISCHARGE

## 2019-11-14 RX ORDER — MIRTAZAPINE 45 MG/1
1 TABLET, ORALLY DISINTEGRATING ORAL
Qty: 0 | Refills: 0 | DISCHARGE

## 2019-11-14 RX ORDER — DILTIAZEM HCL 120 MG
1 CAPSULE, EXT RELEASE 24 HR ORAL
Qty: 0 | Refills: 0 | DISCHARGE

## 2019-11-14 RX ORDER — GABAPENTIN 400 MG/1
1 CAPSULE ORAL
Qty: 0 | Refills: 0 | DISCHARGE

## 2019-11-14 RX ADMIN — ONDANSETRON 4 MILLIGRAM(S): 8 TABLET, FILM COATED ORAL at 06:31

## 2019-11-14 RX ADMIN — ONDANSETRON 4 MILLIGRAM(S): 8 TABLET, FILM COATED ORAL at 11:59

## 2019-11-14 RX ADMIN — Medication 1000 MILLIGRAM(S): at 01:30

## 2019-11-14 RX ADMIN — PANTOPRAZOLE SODIUM 40 MILLIGRAM(S): 20 TABLET, DELAYED RELEASE ORAL at 11:58

## 2019-11-14 RX ADMIN — Medication 516 MILLIGRAM(S): at 05:23

## 2019-11-14 RX ADMIN — ONDANSETRON 4 MILLIGRAM(S): 8 TABLET, FILM COATED ORAL at 17:25

## 2019-11-14 RX ADMIN — Medication 800 MILLIGRAM(S): at 05:45

## 2019-11-14 RX ADMIN — Medication 400 MILLIGRAM(S): at 13:21

## 2019-11-14 RX ADMIN — Medication 1000 MILLIGRAM(S): at 13:35

## 2019-11-14 RX ADMIN — Medication 1000 MILLIGRAM(S): at 08:00

## 2019-11-14 RX ADMIN — SODIUM CHLORIDE 150 MILLILITER(S): 9 INJECTION, SOLUTION INTRAVENOUS at 05:23

## 2019-11-14 RX ADMIN — Medication 30 MILLIGRAM(S): at 12:13

## 2019-11-14 RX ADMIN — ENOXAPARIN SODIUM 40 MILLIGRAM(S): 100 INJECTION SUBCUTANEOUS at 09:31

## 2019-11-14 RX ADMIN — ONDANSETRON 4 MILLIGRAM(S): 8 TABLET, FILM COATED ORAL at 00:15

## 2019-11-14 RX ADMIN — Medication 400 MILLIGRAM(S): at 07:43

## 2019-11-14 RX ADMIN — Medication 400 MILLIGRAM(S): at 01:02

## 2019-11-14 RX ADMIN — SODIUM CHLORIDE 150 MILLILITER(S): 9 INJECTION, SOLUTION INTRAVENOUS at 13:18

## 2019-11-14 NOTE — PROGRESS NOTE ADULT - SUBJECTIVE AND OBJECTIVE BOX
BARIATRIC SURGERY     Pre-Op Dx: Morbid obesity/Bariatric Surgery Status.  Procedure: S/P Lap Sleeve Gastrectomy with intra op EGD and Hiatal Hernia Repair.  Surgeon: Vannesa MUNOZ      Subjective:    64y Female post op day # 1 s/p Lap Sleeve Gastrectomy with intra op EGD and hiatal hernia repair. Minimal incisional discomfort. Denies any nausea or vomiting. Patient has started bariatric clear diet this am and tolerating without any issues.  Ambulating on Floor/ Painter Catheter in place./ Utilizing incentive spirometry as instructed. Nursing indicated HR - ~ 47 BPM this am. Currently taking Diltiazem and Atenolol at home.     LABS:                        14.2   9.02  )-----------( 263      ( 14 Nov 2019 07:16 )             43.6     11-14    137  |  104  |  11  ----------------------------<  134<H>  4.6   |  17<L>  |  0.67    Ca    9.2      14 Nov 2019 07:16        CAPILLARY BLOOD GLUCOSE      POCT Blood Glucose.: 110 mg/dL (14 Nov 2019 07:37)  POCT Blood Glucose.: 124 mg/dL (14 Nov 2019 06:01)  POCT Blood Glucose.: 135 mg/dL (13 Nov 2019 21:29)  POCT Blood Glucose.: 121 mg/dL (13 Nov 2019 18:32)  POCT Blood Glucose.: 75 mg/dL (13 Nov 2019 09:16)            Vital Signs Last 24 Hrs  T(C): 36.6 (14 Nov 2019 07:42), Max: 37.2 (13 Nov 2019 09:21)  T(F): 97.9 (14 Nov 2019 07:42), Max: 98.9 (13 Nov 2019 09:21)  HR: 51 (14 Nov 2019 07:42) (46 - 82)  BP: 105/67 (14 Nov 2019 07:42) (102/56 - 156/58)  BP(mean): --  RR: 18 (14 Nov 2019 07:42) (11 - 18)  SpO2: 97% (14 Nov 2019 07:42) (95% - 100%)    11-13 @ 07:01  -  11-14 @ 07:00  --------------------------------------------------------  IN: 2600 mL / OUT: 3700 mL / NET: -1100 mL        Physical Exam:  General: Alert & Oriented x 3.  NAD, resting comfortably in bed.    Abdominal: Soft - Non tender - No swelling noted. Incision site clean / dry /intact. Normoactive Bowel Sounds.  Extremities: No swelling/ edema / erythema noted bilateral upper / lower extremities.  Neuro: Motor / Sensory function grossly intact bilateral lower/ upper extremities.          Assessment: 64 y  Female Post OP Day # 1 S/P Lap Sleeve Gastrectomy with intra OP EGD and hiatal hernia repair.  History of SVTs/ Bradycardia this am as per nursing- ~ 47 BPM.  History of urinary incontinence/ bladder sling. No prior history of post op urinary retention. Painter catheter in place. Pt is hemodynamically stable.    Plan:  Cont GI / DVT prophylaxis.   Dietician consult.   Cont  OOB / ambulation on floor / incentive spirometry.  Will discuss with hospitalist / may be seen by urology prior to removal of Painter Catheter.   Cont bariatric clear diet as tolerated .  Plan to restart Diltiazem 30 mg am 30 mg pm afternoon and 60 mg at night - as per PCP ( 120 mg / day). Atenolol 25 mg po QD.   Discussed and reviewed home meds  and pain medication with patient . Discussed medication that requires crushing.  Plan to begin protein shakes at home.  Possibly discharged later today or tomorrow.  Dr. Granado  saw pt.

## 2019-11-14 NOTE — PROGRESS NOTE ADULT - ATTENDING COMMENTS
Agree with above.  Patient seen and examined.  Tolerating clears, good urine output, ambulating.  Painter out if able to void will probable discharge home later today.  Follow up in the office next week, call with any concerns.

## 2019-11-14 NOTE — PHARMACOTHERAPY INTERVENTION NOTE - COMMENTS
Patient was educated on home medications: diltiazem 30mg QID, atenolol 25mg, metformin IR 500mg, lamotrigine 200mg, gabapentin, Lipitor 20mg, Remeron 45mg and melatonin 10mg. Patient was on diltiazem  mg but PCP changed to 30mg QID which patient can crush. Informed that home medications can be crushed/open capsule and mixed into puddings, yogurt or applesauce. Told the patient to hold on to any vitamins, OTC/herbal products (even melatonin until the first post-op visit if patient can or get melatonin ODT if possible) and educated to avoid any NSAIDs (provided patient handout with list of NSAIDs medications). Counseled and educated on discharge medications: Percocet, Prilosec, Zofran ODT, Liquid APAP and Bariatric Fusion Vitamins. Informed that percocet does have Tylenol component, advise not to take more than instructed. Informed the patient that Prilosec caps can be opened but told not to chew on the pellets. Advised patient to hydrate and ambulate. Patient was educated on home medications: diltiazem 30mg QID, atenolol 25mg, metformin IR 500mg, lamotrigine 200mg, gabapentin, Lipitor 20mg, Remeron 45mg and melatonin 10mg. Patient was on diltiazem  mg but PCP changed to 30mg twice daily and 60mg (2x30mg tabs) in the evening which patient can crush. Informed that home medications can be crushed/open capsule and mixed into puddings, yogurt or applesauce. Told the patient to hold on to any vitamins, OTC/herbal products (even melatonin until the first post-op visit if patient can or get melatonin ODT if possible) and educated to avoid any NSAIDs (provided patient handout with list of NSAIDs medications). Counseled and educated on discharge medications: Percocet, Prilosec, Zofran ODT, Liquid APAP and Bariatric Fusion Vitamins. Informed that percocet does have Tylenol component, advise not to take more than instructed. Informed the patient that Prilosec caps can be opened but told not to chew on the pellets. Advised patient to hydrate and ambulate.

## 2019-11-14 NOTE — PROGRESS NOTE ADULT - PROBLEM SELECTOR PLAN 1
post op  ladonna  cpap use - nightly  pain assessment  ambulate  I dheeraj  dvt p  oral and skin and wound care  labs and imaging  replete lyeliane  sleep hygiene discussed  will follow

## 2019-11-14 NOTE — PROGRESS NOTE ADULT - SUBJECTIVE AND OBJECTIVE BOX
Date/Time Patient Seen:  		  Referring MD:   Data Reviewed	       Patient is a 64y old  Female who presents with a chief complaint of 63 yo M F with PMHx of morbid obesity admitted to [facility] for scheduled laparoscopic sleeve gastrectomy and intra-operative EGD and hiatal hernia repair. (13 Nov 2019 16:16)      Subjective/HPI     PAST MEDICAL & SURGICAL HISTORY:  Bipolar 1 disorder  Obesity, morbid, BMI 40.0-49.9  H/O insomnia  Diabetes mellitus, type 2  PRESTON (Obstructive Sleep Apnea): cpap at night  IBS (Irritable Bowel Syndrome)  Hypercholesteremia  Anxiety  Mild Depression  Sustained Supraventricular Tachycardia: on meds  Mitral Valve Prolapse  H/O tubal ligation  Hernia: Right Inguinal  repair  History of Arthroscopy of Knee: Right  Fallen Bladder: Bladder Sling  Benign Tumor of Breast: right  S/P Dilation and Curettage        Medication list         MEDICATIONS  (STANDING):  dextrose 10% Bolus 125 milliLiter(s) IV Bolus once  dextrose 10% Bolus 250 milliLiter(s) IV Bolus once  dextrose 5%. 1000 milliLiter(s) (50 mL/Hr) IV Continuous <Continuous>  dextrose 5%. 1000 milliLiter(s) (50 mL/Hr) IV Continuous <Continuous>  dextrose 50% Injectable 12.5 Gram(s) IV Push once  dextrose 50% Injectable 25 Gram(s) IV Push once  dextrose 50% Injectable 25 Gram(s) IV Push once  enoxaparin Injectable 40 milliGRAM(s) SubCutaneous every 12 hours  insulin lispro (HumaLOG) corrective regimen sliding scale   SubCutaneous three times a day before meals  insulin lispro (HumaLOG) corrective regimen sliding scale   SubCutaneous at bedtime  lactated ringers. 1000 milliLiter(s) (150 mL/Hr) IV Continuous <Continuous>  lactated ringers. 2000 milliLiter(s) (1000 mL/Hr) IV Continuous <Continuous>  ondansetron Injectable 4 milliGRAM(s) IV Push every 6 hours  pantoprazole  Injectable 40 milliGRAM(s) IV Push daily    MEDICATIONS  (PRN):  acetaminophen  IVPB .. 1000 milliGRAM(s) IV Intermittent every 6 hours PRN Mild Pain (1 - 3)  dextrose 40% Gel 15 Gram(s) Oral once PRN Blood Glucose LESS THAN 70 milliGRAM(s)/deciLiter  dextrose 40% Gel 15 Gram(s) Oral once PRN Blood Glucose LESS THAN 70 milliGRAM(s)/deciliter  glucagon  Injectable 1 milliGRAM(s) IntraMuscular once PRN Glucose <70 milliGRAM(s)/deciLiter  glucagon  Injectable 1 milliGRAM(s) IntraMuscular once PRN Glucose LESS THAN 70 milligrams/deciliter  HYDROmorphone  Injectable 0.5 milliGRAM(s) IV Push every 4 hours PRN Severe Pain (7 - 10)  hyoscyamine SL 0.125 milliGRAM(s) SubLingual every 6 hours PRN Nausea and/or vomiting  ibuprofen IVPB .. 800 milliGRAM(s) IV Intermittent every 6 hours PRN Moderate Pain (4 - 6)         Vitals log        ICU Vital Signs Last 24 Hrs  T(C): 36.6 (14 Nov 2019 07:42), Max: 37.2 (13 Nov 2019 09:21)  T(F): 97.9 (14 Nov 2019 07:42), Max: 98.9 (13 Nov 2019 09:21)  HR: 51 (14 Nov 2019 07:42) (46 - 82)  BP: 105/67 (14 Nov 2019 07:42) (102/56 - 156/58)  BP(mean): --  ABP: --  ABP(mean): --  RR: 18 (14 Nov 2019 07:42) (11 - 18)  SpO2: 97% (14 Nov 2019 07:42) (95% - 100%)           Input and Output:  I&O's Detail    13 Nov 2019 07:01  -  14 Nov 2019 07:00  --------------------------------------------------------  IN:    IV PiggyBack: 250 mL    lactated ringers.: 2350 mL  Total IN: 2600 mL    OUT:    Indwelling Catheter - Urethral: 3100 mL    Voided: 600 mL  Total OUT: 3700 mL    Total NET: -1100 mL          Lab Data                        14.2   9.02  )-----------( 263      ( 14 Nov 2019 07:16 )             43.6     11-14    137  |  104  |  11  ----------------------------<  134<H>  4.6   |  17<L>  |  0.67    Ca    9.2      14 Nov 2019 07:16              Review of Systems	      Objective     Physical Examination    heart s1s2  lung dec BS  abd soft      Pertinent Lab findings & Imaging      Madina:  NO   Adequate UO     I&O's Detail    13 Nov 2019 07:01  -  14 Nov 2019 07:00  --------------------------------------------------------  IN:    IV PiggyBack: 250 mL    lactated ringers.: 2350 mL  Total IN: 2600 mL    OUT:    Indwelling Catheter - Urethral: 3100 mL    Voided: 600 mL  Total OUT: 3700 mL    Total NET: -1100 mL               Discussed with:     Cultures:	        Radiology

## 2019-11-14 NOTE — PHARMACOTHERAPY INTERVENTION NOTE - SECONDARY MEDICATION
diltiazem, atenolo, metformin, lamotrigene, gabapentin, lipitor, remeron, melatonin, zofran, bariatric fusion vitamins, percocet, prilosec and liquid APAP

## 2019-11-14 NOTE — PROGRESS NOTE ADULT - REASON FOR ADMISSION
63 yo  F with PMHx of morbid obesity admitted to [facility] for scheduled laparoscopic sleeve gastrectomy and intra-operative EGD and hiatal hernia repair.

## 2019-11-14 NOTE — DISCHARGE NOTE NURSING/CASE MANAGEMENT/SOCIAL WORK - PATIENT PORTAL LINK FT
You can access the FollowMyHealth Patient Portal offered by [facility] by registering at the following website: [email or web address]. By joining [facility]’s FollowMyHealth portal, you will also be able to view your health information using other applications (apps) compatible with our system.

## 2019-11-14 NOTE — PROGRESS NOTE ADULT - SUBJECTIVE AND OBJECTIVE BOX
pt comfortable   awake and alert   pain well controlled   VSS abd soft, obese. mild tender. bs         cv s1s2        chest air entry   labs reviewed    a/p 64 f post op day 1 - lap sleeve gastrectomy and hiatal hernia repair   intraop EGD w/o bleeding or extravasation. lumen patent    cont w/ acid suppression and anti emetic prn    ambulation encouraged     surgical f/u re diet     d/c plan

## 2019-11-15 ENCOUNTER — MESSAGE (OUTPATIENT)
Age: 64
End: 2019-11-15

## 2019-11-15 LAB — SURGICAL PATHOLOGY STUDY: SIGNIFICANT CHANGE UP

## 2019-11-21 ENCOUNTER — APPOINTMENT (OUTPATIENT)
Dept: BARIATRICS | Facility: CLINIC | Age: 64
End: 2019-11-21
Payer: COMMERCIAL

## 2019-11-21 VITALS
WEIGHT: 214.95 LBS | HEIGHT: 61 IN | SYSTOLIC BLOOD PRESSURE: 102 MMHG | OXYGEN SATURATION: 95 % | BODY MASS INDEX: 40.58 KG/M2 | HEART RATE: 59 BPM | DIASTOLIC BLOOD PRESSURE: 64 MMHG

## 2019-11-21 PROCEDURE — 99024 POSTOP FOLLOW-UP VISIT: CPT

## 2019-11-21 RX ORDER — OXYCODONE AND ACETAMINOPHEN 5; 325 MG/1; MG/1
5-325 TABLET ORAL 3 TIMES DAILY
Qty: 9 | Refills: 0 | Status: DISCONTINUED | COMMUNITY
Start: 2019-11-07 | End: 2019-11-21

## 2019-11-21 RX ORDER — ONDANSETRON 4 MG/1
4 TABLET, ORALLY DISINTEGRATING ORAL 4 TIMES DAILY
Qty: 15 | Refills: 0 | Status: DISCONTINUED | COMMUNITY
Start: 2019-11-07 | End: 2019-11-21

## 2019-11-21 RX ORDER — ONDANSETRON 4 MG/1
4 TABLET, ORALLY DISINTEGRATING ORAL 4 TIMES DAILY
Qty: 15 | Refills: 0 | Status: DISCONTINUED | COMMUNITY
Start: 2019-11-06 | End: 2019-11-21

## 2019-11-21 RX ORDER — OMEPRAZOLE 20 MG/1
20 CAPSULE, DELAYED RELEASE ORAL DAILY
Qty: 30 | Refills: 1 | Status: DISCONTINUED | COMMUNITY
Start: 2019-11-06 | End: 2019-11-21

## 2019-11-21 RX ORDER — OXYCODONE AND ACETAMINOPHEN 5; 325 MG/1; MG/1
5-325 TABLET ORAL 3 TIMES DAILY
Qty: 9 | Refills: 0 | Status: DISCONTINUED | COMMUNITY
Start: 2019-11-06 | End: 2019-11-21

## 2019-11-21 RX ORDER — CHLORHEXIDINE GLUCONATE 4 %
1000 LIQUID (ML) TOPICAL DAILY
Refills: 0 | Status: DISCONTINUED | COMMUNITY
End: 2019-11-21

## 2019-11-22 NOTE — ASSESSMENT
[FreeTextEntry1] : 64 year old female one week s/p laparoscopic sleeve gastrectomy and hiatal hernia repair presents for first post operative visit. She is doing well and losing weight. Incisions are healing appropriately. The patient was encouraged to continue a liquid low fat, low carbohydrate and high protein diet for another week and then progress to soft/pureed foods as tolerated.  Patient was advised to increase ambulation and not to do any heavy lifting until 6 weeks post op.\par \par Nutrition and exercise counseling provided.\par Start Vitamins and continue omeprazole\par Post op nutrition classes\par Follow up in 3 weeks\par Call with any questions or concerns\par

## 2019-11-22 NOTE — HISTORY OF PRESENT ILLNESS
[Procedure: ___] : Procedure performed: [unfilled]  [Date of Surgery: ___] : Date of Surgery:   [unfilled] [Surgeon Name:   ___] : Surgeon Name: Dr. CAMPOVERDE [Pre-Op Weight ___] : Pre-op weight was [unfilled] lbs [de-identified] : 64 year old female one week s/p laparoscopic sleeve gastrectomy and hiatal hernia repair presents today for first post operative visit. Patient lost 13 lbs since last visit. She is consuming the appropriate amount of protein and fluids daily. She takes omeprazole daily. She did not have BM since surgery but does not feel constipated. Patient denies abdominal pain, acid reflux symptoms, nausea, vomiting and diarrhea. She is ambulating frequently for exercise.

## 2019-11-22 NOTE — REVIEW OF SYSTEMS
[Recent Change In Weight] : ~T recent weight change [Constipation] : constipation [Negative] : Psychiatric [Fever] : no fever [Chills] : no chills [Night Sweats] : no night sweats [Fatigue] : no fatigue [Eye Pain] : no eye pain [Red Eyes] : eyes not red [Vision Problems] : no vision problems [Dysphagia] : no dysphagia [Hoarseness] : no hoarseness [Odynophagia] : no odynophagia [Chest Pain] : no chest pain [Palpitations] : no palpitations [Leg Claudication] : no intermittent leg claudication [Lower Ext Edema] : no lower extremity edema [Shortness Of Breath] : no shortness of breath [Wheezing] : no wheezing [Cough] : no cough [Abdominal Pain] : no abdominal pain [Reflux/Heartburn] : no reflux/ heartburn [FreeTextEntry2] : intentional weight loss

## 2019-12-17 ENCOUNTER — APPOINTMENT (OUTPATIENT)
Dept: BARIATRICS | Facility: CLINIC | Age: 64
End: 2019-12-17
Payer: COMMERCIAL

## 2019-12-17 VITALS
WEIGHT: 206 LBS | OXYGEN SATURATION: 94 % | DIASTOLIC BLOOD PRESSURE: 82 MMHG | SYSTOLIC BLOOD PRESSURE: 130 MMHG | BODY MASS INDEX: 38.89 KG/M2 | HEART RATE: 50 BPM | HEIGHT: 61 IN

## 2019-12-17 PROBLEM — Z87.898 PERSONAL HISTORY OF OTHER SPECIFIED CONDITIONS: Chronic | Status: ACTIVE | Noted: 2019-11-04

## 2019-12-17 PROBLEM — F31.9 BIPOLAR DISORDER, UNSPECIFIED: Chronic | Status: ACTIVE | Noted: 2019-11-13

## 2019-12-17 PROBLEM — E66.01 MORBID (SEVERE) OBESITY DUE TO EXCESS CALORIES: Chronic | Status: ACTIVE | Noted: 2019-11-04

## 2019-12-17 PROBLEM — E11.9 TYPE 2 DIABETES MELLITUS WITHOUT COMPLICATIONS: Chronic | Status: ACTIVE | Noted: 2019-11-04

## 2019-12-17 PROCEDURE — 99024 POSTOP FOLLOW-UP VISIT: CPT

## 2019-12-18 NOTE — REVIEW OF SYSTEMS
[Recent Change In Weight] : ~T recent weight change [Constipation] : constipation [Diarrhea] : diarrhea [Negative] : Psychiatric [Fever] : no fever [Night Sweats] : no night sweats [Chills] : no chills [Fatigue] : no fatigue [Eye Pain] : no eye pain [Red Eyes] : eyes not red [Vision Problems] : no vision problems [Dysphagia] : no dysphagia [Hoarseness] : no hoarseness [Odynophagia] : no odynophagia [Chest Pain] : no chest pain [Leg Claudication] : no intermittent leg claudication [Palpitations] : no palpitations [Lower Ext Edema] : no lower extremity edema [Wheezing] : no wheezing [Shortness Of Breath] : no shortness of breath [Cough] : no cough [Abdominal Pain] : no abdominal pain [Reflux/Heartburn] : no reflux/ heartburn [FreeTextEntry2] : intentional weight loss

## 2019-12-18 NOTE — ASSESSMENT
[FreeTextEntry1] : 64 year old female one month s/p laparoscopic sleeve gastrectomy and hiatal hernia repair presents for post operative visit. She is doing well and continues to lose weight. Incisions are healing appropriately. The patient was encouraged to continue a pureed/soft low fat, low carbohydrate and high protein diet for another week and then progress to regular foods as tolerated.  Patient was advised to increase ambulation and not to do any heavy lifting until 6 weeks post op.\par \par Nutrition and exercise counseling provided.\par Switch Vitamins to capsules\par Food journal and nutritionist appointment\par Follow up in 2 months with labs prior to visit\par Call with any questions or concerns\par

## 2019-12-18 NOTE — HISTORY OF PRESENT ILLNESS
[Procedure: ___] : Procedure performed: [unfilled]  [Date of Surgery: ___] : Date of Surgery:   [unfilled] [Surgeon Name:   ___] : Surgeon Name: Dr. CAMPOVERDE [Pre-Op Weight ___] : Pre-op weight was [unfilled] lbs [de-identified] : 64 year old female one month s/p laparoscopic sleeve gastrectomy and hiatal hernia repair presents for post operative visit. She lost 8 lbs since last visit. Based on brief diet history, she may not be consuming the appropriate amount of protein and water daily. She is tolerating advancement of diet to pureed/soft foods.  Patient has baseline bowel movements of fluctuating between diarrhea and constipation. Patient is taking vitamins 2-3 times a day.  She stopped taking omeprazole at 30 days post op. She denies acid reflux symptoms, nausea and vomiting. She is ambulating frequently for exercise.

## 2020-01-14 ENCOUNTER — APPOINTMENT (OUTPATIENT)
Dept: BARIATRICS | Facility: CLINIC | Age: 65
End: 2020-01-14
Payer: COMMERCIAL

## 2020-01-14 VITALS
DIASTOLIC BLOOD PRESSURE: 76 MMHG | HEIGHT: 61 IN | OXYGEN SATURATION: 96 % | SYSTOLIC BLOOD PRESSURE: 122 MMHG | WEIGHT: 207.01 LBS | BODY MASS INDEX: 39.08 KG/M2 | HEART RATE: 49 BPM

## 2020-01-14 DIAGNOSIS — E66.01 MORBID (SEVERE) OBESITY DUE TO EXCESS CALORIES: ICD-10-CM

## 2020-01-14 PROCEDURE — 99024 POSTOP FOLLOW-UP VISIT: CPT

## 2020-01-14 RX ORDER — OMEPRAZOLE 20 MG/1
20 CAPSULE, DELAYED RELEASE ORAL DAILY
Qty: 30 | Refills: 1 | Status: DISCONTINUED | COMMUNITY
Start: 2019-11-07 | End: 2020-01-14

## 2020-01-14 NOTE — ASSESSMENT
[FreeTextEntry1] : 64 year old female two months s/p laparoscopic sleeve gastrectomy and hiatal hernia repair presents for post operative visit. She is doing well, but didn’t lose any weight. Incisions healed appropriately. The patient was encouraged to continue a low fat, low carbohydrate and high protein diet for another week including increasing protein and water intake.  Patient was advised to increase amount of walking to reach goal of 10,000 steps daily and to incorporate strength exercise.\par \par Nutrition and exercise counseling provided.\par Continue vitamins (increase to 4 times a day)\par Food journal and nutritionist appointment\par Follow up in 1 month with labs prior to visit\par Call with any questions or concerns\par

## 2020-01-14 NOTE — REVIEW OF SYSTEMS
[Recent Change In Weight] : ~T recent weight change [Constipation] : constipation [Diarrhea] : diarrhea [Negative] : Psychiatric [Fever] : no fever [Eye Pain] : no eye pain [Chills] : no chills [Fatigue] : no fatigue [Night Sweats] : no night sweats [Red Eyes] : eyes not red [Dysphagia] : no dysphagia [Vision Problems] : no vision problems [Chest Pain] : no chest pain [Hoarseness] : no hoarseness [Odynophagia] : no odynophagia [Leg Claudication] : no intermittent leg claudication [Palpitations] : no palpitations [Lower Ext Edema] : no lower extremity edema [Cough] : no cough [Wheezing] : no wheezing [Shortness Of Breath] : no shortness of breath [Abdominal Pain] : no abdominal pain [Reflux/Heartburn] : no reflux/ heartburn [FreeTextEntry2] : intentional weight loss

## 2020-01-14 NOTE — PHYSICAL EXAM
[Obese, well nourished, in no acute distress] : obese, well nourished, in no acute distress [Normal] : affect appropriate [de-identified] : obese, soft, nontender, no evidence of hernia. incisions well healed

## 2020-01-14 NOTE — HISTORY OF PRESENT ILLNESS
[Procedure: ___] : Procedure performed: [unfilled]  [Date of Surgery: ___] : Date of Surgery:   [unfilled] [Surgeon Name:   ___] : Surgeon Name: Dr. CAMPOVERDE [Pre-Op Weight ___] : Pre-op weight was [unfilled] lbs [de-identified] : 64 year old female two months s/p laparoscopic sleeve gastrectomy and hiatal hernia repair presents for post operative visit. She maintained weight since last visit, which she attributes to decrease physical activity when she had the flu a few weeks ago. Based on brief diet history, she is consuming more protein than last visit, but may still not be having enough especially at breakfast. She snacks 1-2 times a day on hummus and carrots. She is not having sufficient amount of water daily.  She is tolerating advancement of diet to regular foods.  Patient has baseline bowel movements of fluctuating between diarrhea and constipation. Patient is taking vitamins 3 times a day. She denies acid reflux symptoms, nausea and vomiting. She is ambulating frequently for exercise.

## 2020-01-28 ENCOUNTER — APPOINTMENT (OUTPATIENT)
Dept: BARIATRICS/WEIGHT MGMT | Facility: CLINIC | Age: 65
End: 2020-01-28
Payer: COMMERCIAL

## 2020-01-28 VITALS — HEIGHT: 61 IN | WEIGHT: 203.7 LBS | BODY MASS INDEX: 38.46 KG/M2

## 2020-01-28 PROCEDURE — 97803 MED NUTRITION INDIV SUBSEQ: CPT

## 2020-02-18 ENCOUNTER — APPOINTMENT (OUTPATIENT)
Dept: BARIATRICS | Facility: CLINIC | Age: 65
End: 2020-02-18
Payer: COMMERCIAL

## 2020-02-18 VITALS
WEIGHT: 203.7 LBS | OXYGEN SATURATION: 98 % | SYSTOLIC BLOOD PRESSURE: 138 MMHG | BODY MASS INDEX: 38.46 KG/M2 | HEART RATE: 48 BPM | DIASTOLIC BLOOD PRESSURE: 81 MMHG | HEIGHT: 61 IN

## 2020-02-18 DIAGNOSIS — Z87.898 PERSONAL HISTORY OF OTHER SPECIFIED CONDITIONS: ICD-10-CM

## 2020-02-18 PROCEDURE — 99214 OFFICE O/P EST MOD 30 MIN: CPT

## 2020-02-19 NOTE — ASSESSMENT
[FreeTextEntry1] : 64 year old F s/p lap sleeve gastrectomy and intra- op EGD and hiatal hernia repair. Weight loss since last visit.\par \par Discussed and reviewed recent labs with patient. Plan to repeat blood work in March 2020 - PCP. \par Will continue multivitamins and continue protein and liquid intake as instructed.\par Call for any questions or concerns.\par \par Nutritional counseling has been provided. The patient is encouraged to remain calorie conscious and continue a low fat, low carbohydrate, protein focus diet. Pt encouraged to participate in a daily exercise regimen incorporating cardio and  strength training. \par \par Return to office in 6-8 weeks or earlier if any concerns.\par

## 2020-02-19 NOTE — REVIEW OF SYSTEMS
[Recent Change In Weight] : ~T recent weight change [Diarrhea] : diarrhea [Negative] : Endocrine [Fever] : no fever [Chills] : no chills [Dysphagia] : no dysphagia [Chest Pain] : no chest pain [Lower Ext Edema] : no lower extremity edema [Wheezing] : no wheezing [Cough] : no cough [SOB on Exertion] : no shortness of breath during exertion [Abdominal Pain] : no abdominal pain [Vomiting] : no vomiting [Constipation] : no constipation [Reflux/Heartburn] : no reflux/ heartburn [Hernia] : no hernia [FreeTextEntry2] : weight loss

## 2020-02-19 NOTE — PHYSICAL EXAM
[Obese, well nourished, in no acute distress] : obese, well nourished, in no acute distress [Normal] : affect appropriate [de-identified] : normoactive bowel sounds, soft and non tender, no hepatosplenomegaly or masses appreciated.

## 2020-02-19 NOTE — HISTORY OF PRESENT ILLNESS
[Procedure: ___] : Procedure performed: [unfilled]  [Date of Surgery: ___] : Date of Surgery:   [unfilled] [Surgeon Name:   ___] : Surgeon Name: Dr. CAMPOVERDE [Pre-Op Weight ___] : Pre-op weight was [unfilled] lbs [de-identified] : 64 year old F s/p lap sleeve gastrectomy and intra- op EGD and hiatal hernia repair. Weight loss since last visit.Denies any food intolerances. Pt is consuming consistent protein focus meals and consuming a sufficient amount of zero calorie liquid per day. Pt aware she needs to increase her daily liquid and protein intake Consuming 3 meals per day. Patient is taking vitamin supplements as directed. No change in BM. No reflux symptoms. Exercising regularly incorporating cardio. Plan to start  strength training and track her daily steps. Pt recently saw nutritionist on 1/28/2020- requesting to discuss and review recent labs performed.  Elevated TSH - 5.90 . Plan to follow up with PCP in March 2020 and repeat.

## 2020-02-20 ENCOUNTER — APPOINTMENT (OUTPATIENT)
Dept: CARDIOLOGY | Facility: CLINIC | Age: 65
End: 2020-02-20
Payer: COMMERCIAL

## 2020-02-20 ENCOUNTER — NON-APPOINTMENT (OUTPATIENT)
Age: 65
End: 2020-02-20

## 2020-02-20 VITALS — HEIGHT: 61 IN | WEIGHT: 203 LBS | BODY MASS INDEX: 38.33 KG/M2

## 2020-02-20 VITALS — OXYGEN SATURATION: 97 % | DIASTOLIC BLOOD PRESSURE: 54 MMHG | HEART RATE: 44 BPM | SYSTOLIC BLOOD PRESSURE: 105 MMHG

## 2020-02-20 PROCEDURE — 93000 ELECTROCARDIOGRAM COMPLETE: CPT

## 2020-02-20 PROCEDURE — 99214 OFFICE O/P EST MOD 30 MIN: CPT

## 2020-02-20 NOTE — HISTORY OF PRESENT ILLNESS
[FreeTextEntry1] : Patient with morbid obesity , MVP PSVT. bipolar disorder, sleep apnea not compliant to CPAP, hypothyroidism  DM who had bariatric surgery , , says she is doing well , she lost 20 pounds since her surgery \par \par Patient denies any new complaints , her cholesterol is controlled as well as her sugar  \par  patient  heart rate is markedly decreased   her TSH 5.9 normal free T4  , patient denies any dizziness \par \par Patient had normal nuclear perfusion scan   \par Patient says her bipolar is under control .Patient is not complaint to CPAP machine \par \par \par Patient was placed on medication for bipolar since February 2013 \par \par \par

## 2020-02-20 NOTE — DISCUSSION/SUMMARY
[Anxiety] : anxiety disorder NOS [Paroxysmal SVT] : paroxysmal supraventricular tachycardia [Patient] : the patient [Stable] : stable [Paroxysmal Atrial Tachycardia] : paroxysmal atrial tachycardia [None] : none

## 2020-02-20 NOTE — REVIEW OF SYSTEMS
[Fever] : no fever [Chills] : no chills [Blurry Vision] : no blurred vision [Earache] : no earache [Mouth Sores] : no mouth sores [Shortness Of Breath] : no shortness of breath [Dyspnea on exertion] : not dyspnea during exertion [Chest Pain] : no chest pain [Chest  Pressure] : no chest pressure [Lower Ext Edema] : no extremity edema [Leg Claudication] : no intermittent leg claudication [Cough] : no cough [see HPI] : see HPI [Wheezing] : no wheezing [Incontinence] : no incontinence [Joint Pain] : no joint pain

## 2020-02-20 NOTE — REASON FOR VISIT
[Follow-Up - Clinic] : a clinic follow-up of [Hyperlipidemia] : hyperlipidemia [Medication Management] : Medication management [Palpitations] : palpitations [Mitral Regurgitation] : mitral regurgitation [Supraventricular Tachycardia] : supraventricular tachycardia [FreeTextEntry1] : Morbid obesity , pre op

## 2020-02-20 NOTE — PHYSICAL EXAM
[General Appearance - Well Developed] : well developed [Normal Oral Mucosa] : normal oral mucosa [Normal Conjunctiva] : the conjunctiva exhibited no abnormalities [Respiration, Rhythm And Depth] : normal respiratory rhythm and effort [Exaggerated Use Of Accessory Muscles For Inspiration] : no accessory muscle use [] : no respiratory distress [Chest Palpation] : palpation of the chest revealed no abnormalities [Lungs Percussion] : the lungs were normal to percussion [Auscultation Breath Sounds / Voice Sounds] : lungs were clear to auscultation bilaterally [Heart Rate And Rhythm] : heart rate and rhythm were normal [Heart Sounds] : normal S1 and S2 [Veins - Varicosity Changes] : no varicosital changes were noted in the lower extremities [Edema] : no peripheral edema present [Bowel Sounds] : normal bowel sounds [FreeTextEntry1] : MSC [Systolic grade ___/6] : A grade [unfilled]/6 systolic murmur was heard. [Nail Clubbing] : no clubbing of the fingernails [Abdomen Mass (___ Cm)] : no abdominal mass palpated [Abdomen Soft] : soft [Abnormal Walk] : normal gait [Cyanosis, Localized] : no localized cyanosis [Skin Turgor] : normal skin turgor [Oriented To Time, Place, And Person] : oriented to person, place, and time [Neck Supple] : was supple [Normal Appearance] : was normal in appearance

## 2020-03-03 ENCOUNTER — APPOINTMENT (OUTPATIENT)
Dept: BARIATRICS/WEIGHT MGMT | Facility: CLINIC | Age: 65
End: 2020-03-03
Payer: COMMERCIAL

## 2020-03-03 VITALS — WEIGHT: 200.18 LBS | BODY MASS INDEX: 37.79 KG/M2 | HEIGHT: 61 IN

## 2020-03-03 PROCEDURE — 97803 MED NUTRITION INDIV SUBSEQ: CPT

## 2020-03-04 LAB
25(OH)D3 SERPL-MCNC: 32.5 NG/ML
ALBUMIN SERPL ELPH-MCNC: 4.2 G/DL
ALP BLD-CCNC: 126 U/L
ALT SERPL-CCNC: 22 U/L
ANION GAP SERPL CALC-SCNC: 13 MMOL/L
AST SERPL-CCNC: 13 U/L
BASOPHILS # BLD AUTO: 0.03 K/UL
BASOPHILS NFR BLD AUTO: 0.6 %
BILIRUB SERPL-MCNC: 0.3 MG/DL
BUN SERPL-MCNC: 15 MG/DL
CALCIUM SERPL-MCNC: 9.9 MG/DL
CHLORIDE SERPL-SCNC: 108 MMOL/L
CHOLEST SERPL-MCNC: 111 MG/DL
CHOLEST/HDLC SERPL: 2.8 RATIO
CO2 SERPL-SCNC: 23 MMOL/L
CREAT SERPL-MCNC: 0.69 MG/DL
EOSINOPHIL # BLD AUTO: 0.14 K/UL
EOSINOPHIL NFR BLD AUTO: 2.8 %
FERRITIN SERPL-MCNC: 55 NG/ML
FOLATE SERPL-MCNC: 16 NG/ML
GLUCOSE SERPL-MCNC: 117 MG/DL
HCT VFR BLD CALC: 42.3 %
HDLC SERPL-MCNC: 40 MG/DL
HGB BLD-MCNC: 13.6 G/DL
IMM GRANULOCYTES NFR BLD AUTO: 0.2 %
IRON SATN MFR SERPL: 17 %
IRON SERPL-MCNC: 54 UG/DL
LDLC SERPL CALC-MCNC: 50 MG/DL
LYMPHOCYTES # BLD AUTO: 2.19 K/UL
LYMPHOCYTES NFR BLD AUTO: 43.6 %
MAN DIFF?: NORMAL
MCHC RBC-ENTMCNC: 29.3 PG
MCHC RBC-ENTMCNC: 32.2 GM/DL
MCV RBC AUTO: 91.2 FL
MONOCYTES # BLD AUTO: 0.25 K/UL
MONOCYTES NFR BLD AUTO: 5 %
NEUTROPHILS # BLD AUTO: 2.4 K/UL
NEUTROPHILS NFR BLD AUTO: 47.8 %
PLATELET # BLD AUTO: 237 K/UL
POTASSIUM SERPL-SCNC: 4.2 MMOL/L
PROT SERPL-MCNC: 6.6 G/DL
RBC # BLD: 4.64 M/UL
RBC # FLD: 12.6 %
SODIUM SERPL-SCNC: 144 MMOL/L
T4 FREE SERPL-MCNC: 1.2 NG/DL
TIBC SERPL-MCNC: 309 UG/DL
TRIGL SERPL-MCNC: 102 MG/DL
TSH SERPL-ACNC: 5.9 UIU/ML
UIBC SERPL-MCNC: 255 UG/DL
VIT A SERPL-MCNC: 37.5 UG/DL
VIT B1 SERPL-MCNC: 133.9 NMOL/L
VIT B12 SERPL-MCNC: 639 PG/ML
VIT B2 SERPL-MCNC: 155 UG/L
VIT B6 SERPL-MCNC: 3.8 UG/L
WBC # FLD AUTO: 5.02 K/UL
ZINC SERPL-MCNC: 88 UG/DL

## 2020-03-12 ENCOUNTER — APPOINTMENT (OUTPATIENT)
Dept: BARIATRICS | Facility: CLINIC | Age: 65
End: 2020-03-12
Payer: COMMERCIAL

## 2020-03-12 VITALS
OXYGEN SATURATION: 97 % | SYSTOLIC BLOOD PRESSURE: 110 MMHG | HEART RATE: 52 BPM | HEIGHT: 61 IN | BODY MASS INDEX: 38.29 KG/M2 | DIASTOLIC BLOOD PRESSURE: 70 MMHG | WEIGHT: 202.82 LBS

## 2020-03-12 DIAGNOSIS — E55.9 VITAMIN D DEFICIENCY, UNSPECIFIED: ICD-10-CM

## 2020-03-12 DIAGNOSIS — E53.0 RIBOFLAVIN DEFICIENCY: ICD-10-CM

## 2020-03-12 PROCEDURE — 99213 OFFICE O/P EST LOW 20 MIN: CPT

## 2020-03-12 NOTE — HISTORY OF PRESENT ILLNESS
[Procedure: ___] : Procedure performed: [unfilled]  [Date of Surgery: ___] : Date of Surgery:   [unfilled] [Surgeon Name:   ___] : Surgeon Name: Dr. CAMPOVERDE [Pre-Op Weight ___] : Pre-op weight was [unfilled] lbs [de-identified] : 64 year old female four months s/p laparoscopic sleeve gastrectomy and hiatal hernia repair presents for follow up visit. She lost 1 lb since last visit. She expresses frustration with plateau of weight loss. Patient saw nutritionist last week and identified snacking and lack of strength exercises to possibly hindering weight loss. She is consuming the appropriate amount of protein daily and improved water intake daily. She is snacking less frequently. She is taking vitamins daily. Patient has baseline bowel movements of fluctuating between diarrhea and constipation. She denies acid reflux symptoms, nausea and vomiting. For exercise, she is walking frequently. Patient had labs drawn last month and results were already reviewed with patient. She will be repeating TSH at PCP office next week.

## 2020-03-12 NOTE — REVIEW OF SYSTEMS
[Recent Change In Weight] : ~T recent weight change [Constipation] : constipation [Diarrhea] : diarrhea [Negative] : Heme/Lymph [Fever] : no fever [Chills] : no chills [Dysphagia] : no dysphagia [Chest Pain] : no chest pain [Lower Ext Edema] : no lower extremity edema [Wheezing] : no wheezing [Cough] : no cough [SOB on Exertion] : no shortness of breath during exertion [Abdominal Pain] : no abdominal pain [Vomiting] : no vomiting [Reflux/Heartburn] : no reflux/ heartburn [Hernia] : no hernia [FreeTextEntry2] : weight loss

## 2020-03-12 NOTE — PHYSICAL EXAM
[Obese, well nourished, in no acute distress] : obese, well nourished, in no acute distress [Normal] : affect appropriate [de-identified] : EOMI, anicteric  [de-identified] : obese, soft, nontender, no evidence of hernia. incisions well healed

## 2020-03-12 NOTE — ASSESSMENT
[FreeTextEntry1] : 64 year old female 4 months s/p laparoscopic sleeve gastrectomy and hiatal hernia repair presents for follow up visit. She is frustrated with inability to lose weight.  The patient was encouraged to continue a low fat, low carbohydrate and high protein diet and minimize snacking outside of physical hunger.  Patient was again advised to increase amount of walking to reach goal of 10,000 steps daily and to incorporate strength exercises. Given information on exercise. \par \par Nutrition and exercise counseling provided.\par Continue vitamins\par Appointment with Dr. Moffett in two weeks\par Follow up appointment with nutritionist in beginning of May\par Follow up in 1 month\par Call with any questions or concerns\par

## 2020-03-24 ENCOUNTER — APPOINTMENT (OUTPATIENT)
Dept: BARIATRICS/WEIGHT MGMT | Facility: CLINIC | Age: 65
End: 2020-03-24
Payer: COMMERCIAL

## 2020-03-24 PROCEDURE — 98968 PH1 ASSMT&MGMT NQHP 21-30: CPT

## 2020-04-14 ENCOUNTER — APPOINTMENT (OUTPATIENT)
Dept: BARIATRICS | Facility: CLINIC | Age: 65
End: 2020-04-14
Payer: COMMERCIAL

## 2020-04-14 ENCOUNTER — APPOINTMENT (OUTPATIENT)
Dept: BARIATRICS | Facility: CLINIC | Age: 65
End: 2020-04-14

## 2020-04-14 VITALS — BODY MASS INDEX: 37.23 KG/M2 | HEIGHT: 61 IN | WEIGHT: 197.2 LBS

## 2020-04-14 PROCEDURE — 99214 OFFICE O/P EST MOD 30 MIN: CPT | Mod: 95

## 2020-04-15 NOTE — ASSESSMENT
[FreeTextEntry1] : 64 year old female 5 months s/p laparoscopic sleeve gastrectomy and hiatal hernia repair presents for follow up visit. She is doing well and losing weight.  The patient was encouraged to continue a low fat, low carbohydrate and high protein diet. It was recommended that she food journal prior to nutritionist appointment next month. Patient was again advised to increase amount of walking to reach goal of 10,000 steps daily and to incorporate strength exercises. \par \par Nutrition and exercise counseling provided.\par Continue vitamins - advised to take 2 chewable tabs twice a day\par Food journal and nutritionist appointment in May\par Follow up in 6 weeks\par Call with any questions or concerns\par

## 2020-04-15 NOTE — HISTORY OF PRESENT ILLNESS
[Procedure: ___] : Procedure performed: [unfilled]  [Date of Surgery: ___] : Date of Surgery:   [unfilled] [Surgeon Name:   ___] : Surgeon Name: Dr. CAMPOVERDE [Pre-Op Weight ___] : Pre-op weight was [unfilled] lbs [Home] : at home, [unfilled] , at the time of the visit. [Other Location: e.g. Home (Enter Location, City,State)___] : at [unfilled] [Patient] : the patient [Self] : self [de-identified] : 64 year old female five months s/p laparoscopic sleeve gastrectomy and hiatal hernia repair presents for follow up visit. She lost 3 lb since last visit. Patient reports that PCP started on levothyroxine on April 1, but was unable to get TSH rechecked prior to initiation of medication. She will be following up with PCP in May. Based on brief diet history, she is consuming the appropriate amount of protein daily and water daily. She is no longer snacking on pretzels, but occasionally has carrots with hummus. She is taking vitamins daily and some days only has 3 out of 4 chewable vitamins. Patient is now having more regular bowel movements. She denies acid reflux symptoms, nausea and vomiting. For exercise, she goes for walks (25-30 minutes) has long as it is nice out.

## 2020-04-15 NOTE — REVIEW OF SYSTEMS
[Recent Change In Weight] : ~T recent weight change [Negative] : Heme/Lymph [Fever] : no fever [Chills] : no chills [Dysphagia] : no dysphagia [Chest Pain] : no chest pain [Lower Ext Edema] : no lower extremity edema [Wheezing] : no wheezing [Cough] : no cough [SOB on Exertion] : no shortness of breath during exertion [Abdominal Pain] : no abdominal pain [Vomiting] : no vomiting [Constipation] : no constipation [Diarrhea] : no diarrhea [Reflux/Heartburn] : no reflux/ heartburn [Hernia] : no hernia [FreeTextEntry2] : weight loss

## 2020-04-15 NOTE — PHYSICAL EXAM
[Obese, well nourished, in no acute distress] : obese, well nourished, in no acute distress [Normal] : affect appropriate [de-identified] : corrective lenses, EOMI, anicteric

## 2020-04-25 ENCOUNTER — MESSAGE (OUTPATIENT)
Age: 65
End: 2020-04-25

## 2020-05-05 ENCOUNTER — APPOINTMENT (OUTPATIENT)
Dept: BARIATRICS/WEIGHT MGMT | Facility: CLINIC | Age: 65
End: 2020-05-05
Payer: COMMERCIAL

## 2020-05-05 PROCEDURE — 97803 MED NUTRITION INDIV SUBSEQ: CPT

## 2020-05-05 PROCEDURE — 98968 PH1 ASSMT&MGMT NQHP 21-30: CPT

## 2020-05-14 ENCOUNTER — APPOINTMENT (OUTPATIENT)
Dept: CARDIOLOGY | Facility: CLINIC | Age: 65
End: 2020-05-14

## 2020-05-15 ENCOUNTER — APPOINTMENT (OUTPATIENT)
Dept: DISASTER EMERGENCY | Facility: CLINIC | Age: 65
End: 2020-05-15

## 2020-05-15 LAB
SARS-COV-2 IGG SERPL IA-ACNC: <0.1 INDEX
SARS-COV-2 IGG SERPL QL IA: NEGATIVE

## 2020-06-01 VITALS — HEIGHT: 61 IN | BODY MASS INDEX: 36.44 KG/M2 | WEIGHT: 193 LBS

## 2020-06-02 ENCOUNTER — APPOINTMENT (OUTPATIENT)
Dept: BARIATRICS | Facility: CLINIC | Age: 65
End: 2020-06-02
Payer: COMMERCIAL

## 2020-06-02 VITALS — BODY MASS INDEX: 36.44 KG/M2 | HEIGHT: 61 IN | WEIGHT: 193 LBS

## 2020-06-02 PROCEDURE — 99214 OFFICE O/P EST MOD 30 MIN: CPT | Mod: 95

## 2020-06-03 NOTE — ADDENDUM
[FreeTextEntry1] : Addendum : Received most recent labs performed by PCP on 5/14/2020 - CMP - within normal limits. Plan to forward script for additional labs including vitamin levels.

## 2020-06-03 NOTE — HISTORY OF PRESENT ILLNESS
[Home] : at home, [unfilled] , at the time of the visit. [Verbal consent obtained from patient] : the patient, [unfilled] [Other Location: e.g. Home (Enter Location, City,State)___] : at [unfilled] [Procedure: ___] : Procedure performed: [unfilled]  [Date of Surgery: ___] : Date of Surgery:   [unfilled] [Surgeon Name:   ___] : Surgeon Name: Dr. CAMPOVERDE [Pre-Op Weight ___] : Pre-op weight was [unfilled] lbs [de-identified] : 63 yo F s/p laparoscopic sleeve gastrectomy and hiatal hernia repair presents for follow up TELEMEDICINE Visit due to recent Pandemic. Pt lost 4 lbs since last visit. Recent Serology IgG testing - negative for COVID 19 .She will be following up with PCP in May. Based on brief diet history, she is consuming the appropriate amount of protein daily and water daily. Pt is consuming 3 meals a day /denies snacking.Pt is currently working from home.  She is taking vitamins daily as instructed. Patient is now having more regular bowel movements/ occasional constipation . She denies acid reflux symptoms, nausea and vomiting. For exercise, she goes for walks (25-30 minutes) has long as it is nice out. Plan to have most recent blood work performed in May 2020 faxed over from PCP.Plan to transition from bariatric Chewables to PO pills. Pt recently saw nutritionist on 5/5/2020 and discussed nutritional guidelines and weight loss strategies.

## 2020-06-03 NOTE — REVIEW OF SYSTEMS
[Recent Change In Weight] : ~T recent weight change [Constipation] : constipation [Negative] : Allergic/Immunologic [Fever] : no fever [Chills] : no chills [Dysphagia] : no dysphagia [Shortness Of Breath] : no shortness of breath [Loss of Hearing] : no loss of hearing [Hoarseness] : no hoarseness [Wheezing] : no wheezing [Cough] : no cough [SOB on Exertion] : no shortness of breath during exertion [Abdominal Pain] : no abdominal pain [Vomiting] : no vomiting [Diarrhea] : no diarrhea [FreeTextEntry2] : weight loss since last visit.  [Reflux/Heartburn] : no reflux/ heartburn

## 2020-06-03 NOTE — ASSESSMENT
[FreeTextEntry1] : 65 yo F s/p laparoscopic sleeve gastrectomy and hiatal hernia repair presents for follow up TELEMEDICINE Visit due to recent Pandemic. Pt lost 4 lbs since last visit.\par \par Plan to email exercise packet\par Nutrition and exercise counseling provided.\par Continue vitamins - Plan to transition from bariatric Chewables to PO pills. \par Called PCP awaiting results of recent blood work to be faxed to office  - will review and order additional labs if needed. \par Follow up in 8 weeks- Telemedicine Visit. \par Call with any questions or concerns.\par \par \par

## 2020-06-16 ENCOUNTER — APPOINTMENT (OUTPATIENT)
Dept: BARIATRICS/WEIGHT MGMT | Facility: CLINIC | Age: 65
End: 2020-06-16
Payer: COMMERCIAL

## 2020-06-16 PROCEDURE — 97803 MED NUTRITION INDIV SUBSEQ: CPT | Mod: 95

## 2020-06-17 VITALS — HEIGHT: 61 IN | WEIGHT: 190 LBS | BODY MASS INDEX: 35.87 KG/M2

## 2020-07-14 ENCOUNTER — RX RENEWAL (OUTPATIENT)
Age: 65
End: 2020-07-14

## 2020-08-04 ENCOUNTER — APPOINTMENT (OUTPATIENT)
Dept: BARIATRICS | Facility: CLINIC | Age: 65
End: 2020-08-04
Payer: COMMERCIAL

## 2020-08-04 VITALS — HEIGHT: 61 IN | BODY MASS INDEX: 35.3 KG/M2 | WEIGHT: 187 LBS

## 2020-08-04 DIAGNOSIS — Z87.19 PERSONAL HISTORY OF OTHER DISEASES OF THE DIGESTIVE SYSTEM: ICD-10-CM

## 2020-08-04 DIAGNOSIS — Z86.39 PERSONAL HISTORY OF OTHER ENDOCRINE, NUTRITIONAL AND METABOLIC DISEASE: ICD-10-CM

## 2020-08-04 PROCEDURE — 99214 OFFICE O/P EST MOD 30 MIN: CPT | Mod: 95

## 2020-08-04 NOTE — PHYSICAL EXAM
[Obese, well nourished, in no acute distress] : obese, well nourished, in no acute distress [Normal] : affect appropriate [de-identified] : corrective lenses, EOMI, anicteric

## 2020-08-04 NOTE — HISTORY OF PRESENT ILLNESS
[Home] : at home, [unfilled] , at the time of the visit. [Medical Office: ([facility])___] : at the medical office located in  [Verbal consent obtained from patient] : the patient, [unfilled] [Date of Surgery: ___] : Date of Surgery:   [unfilled] [Procedure: ___] : Procedure performed: [unfilled]  [Surgeon Name:   ___] : Surgeon Name: Dr. CAMPOVERDE [Pre-Op Weight ___] : Pre-op weight was [unfilled] lbs [de-identified] : 64 year old female eight months s/p laparoscopic sleeve gastrectomy and hiatal hernia repair presents for follow up visit. She lost 6 lb since last visit.  Patient had follow up nutritionist appointment last month and based on brief diet history, she is consuming the appropriate amount of protein daily and water daily. She is not snacking. Patient was supposed to have nutritionist appointment today but there were technical difficulties secondary to loss of power. She switched vitamins to capsules and added Calcium chews. Patient occasionally has constipation. She denies acid reflux symptoms, nausea and vomiting. She walks frequently for exercise and did not start strength exercises.

## 2020-08-04 NOTE — REVIEW OF SYSTEMS
[Recent Change In Weight] : ~T recent weight change [Negative] : Neurological [Fever] : no fever [Chills] : no chills [Dysphagia] : no dysphagia [Chest Pain] : no chest pain [Wheezing] : no wheezing [Lower Ext Edema] : no lower extremity edema [SOB on Exertion] : no shortness of breath during exertion [Cough] : no cough [Constipation] : no constipation [Vomiting] : no vomiting [Abdominal Pain] : no abdominal pain [Diarrhea] : no diarrhea [Hernia] : no hernia [Reflux/Heartburn] : no reflux/ heartburn [FreeTextEntry2] : weight loss

## 2020-08-04 NOTE — ASSESSMENT
[FreeTextEntry1] : 64 year old female 8 months s/p laparoscopic sleeve gastrectomy and hiatal hernia repair presents for follow up visit. She is doing well and losing weight.  The patient was encouraged to continue a low fat, low carbohydrate and high protein diet. It was recommended that she reschedule today's nutritionist appointment prior to next visit. Patient was again advised to increase amount of walking to reach goal of 10,000 steps daily and to incorporate strength exercises. \par \par Nutrition and exercise counseling provided.\par Continue vitamins\par Food journal and nutritionist appointment \par Follow up in 2 months with labs prior to visit\par Call with any questions or concerns\par

## 2020-08-18 ENCOUNTER — APPOINTMENT (OUTPATIENT)
Dept: BARIATRICS/WEIGHT MGMT | Facility: CLINIC | Age: 65
End: 2020-08-18
Payer: COMMERCIAL

## 2020-08-18 PROCEDURE — 97803 MED NUTRITION INDIV SUBSEQ: CPT | Mod: 95

## 2020-08-19 VITALS — BODY MASS INDEX: 34.74 KG/M2 | HEIGHT: 61 IN | WEIGHT: 184 LBS

## 2020-09-03 ENCOUNTER — NON-APPOINTMENT (OUTPATIENT)
Age: 65
End: 2020-09-03

## 2020-09-03 ENCOUNTER — APPOINTMENT (OUTPATIENT)
Dept: CARDIOLOGY | Facility: CLINIC | Age: 65
End: 2020-09-03
Payer: COMMERCIAL

## 2020-09-03 VITALS
BODY MASS INDEX: 33.99 KG/M2 | SYSTOLIC BLOOD PRESSURE: 116 MMHG | OXYGEN SATURATION: 97 % | WEIGHT: 180 LBS | HEIGHT: 61 IN | DIASTOLIC BLOOD PRESSURE: 71 MMHG | HEART RATE: 50 BPM

## 2020-09-03 PROCEDURE — 99214 OFFICE O/P EST MOD 30 MIN: CPT

## 2020-09-03 PROCEDURE — 93000 ELECTROCARDIOGRAM COMPLETE: CPT

## 2020-09-03 RX ORDER — ANTIARTHRITIC COMBINATION NO.2 900 MG
5000 TABLET ORAL
Refills: 0 | Status: ACTIVE | COMMUNITY

## 2020-09-03 RX ORDER — ATORVASTATIN CALCIUM 10 MG/1
10 TABLET, FILM COATED ORAL DAILY
Qty: 90 | Refills: 3 | Status: ACTIVE | COMMUNITY

## 2020-09-03 NOTE — DISCUSSION/SUMMARY
[Anxiety] : anxiety disorder NOS [Paroxysmal SVT] : paroxysmal supraventricular tachycardia [Paroxysmal Atrial Tachycardia] : paroxysmal atrial tachycardia [Patient] : the patient [Stable] : stable [None] : none

## 2020-09-03 NOTE — PHYSICAL EXAM
[General Appearance - Well Developed] : well developed [Normal Oral Mucosa] : normal oral mucosa [Normal Conjunctiva] : the conjunctiva exhibited no abnormalities [Exaggerated Use Of Accessory Muscles For Inspiration] : no accessory muscle use [Respiration, Rhythm And Depth] : normal respiratory rhythm and effort [] : no respiratory distress [Auscultation Breath Sounds / Voice Sounds] : lungs were clear to auscultation bilaterally [Chest Palpation] : palpation of the chest revealed no abnormalities [Lungs Percussion] : the lungs were normal to percussion [Heart Rate And Rhythm] : heart rate and rhythm were normal [Edema] : no peripheral edema present [Heart Sounds] : normal S1 and S2 [Veins - Varicosity Changes] : no varicosital changes were noted in the lower extremities [Systolic grade ___/6] : A grade [unfilled]/6 systolic murmur was heard. [Bowel Sounds] : normal bowel sounds [Abdomen Soft] : soft [Abdomen Mass (___ Cm)] : no abdominal mass palpated [Abnormal Walk] : normal gait [Nail Clubbing] : no clubbing of the fingernails [Skin Turgor] : normal skin turgor [Cyanosis, Localized] : no localized cyanosis [Oriented To Time, Place, And Person] : oriented to person, place, and time [Normal Appearance] : was normal in appearance [Neck Supple] : was supple [FreeTextEntry1] : MSC

## 2020-09-03 NOTE — REVIEW OF SYSTEMS
[see HPI] : see HPI [Fever] : no fever [Chills] : no chills [Earache] : no earache [Blurry Vision] : no blurred vision [Mouth Sores] : no mouth sores [Chest  Pressure] : no chest pressure [Shortness Of Breath] : no shortness of breath [Dyspnea on exertion] : not dyspnea during exertion [Lower Ext Edema] : no extremity edema [Chest Pain] : no chest pain [Cough] : no cough [Leg Claudication] : no intermittent leg claudication [Wheezing] : no wheezing [Incontinence] : no incontinence [Joint Pain] : no joint pain

## 2020-09-03 NOTE — HISTORY OF PRESENT ILLNESS
[FreeTextEntry1] : Patient with morbid obesity , MVP PSVT. bipolar disorder, sleep apnea not compliant to CPAP, hypothyroidism  DM came for follow up , Patient lost significant weight after bariatric surgery , patient is feeling much better , \par \par Patient denies any new complaints , her cholesterol is controlled as well as her sugar   HB A1c  5.8  as per patient \par \par Patient says her bipolar is under control .Patient is not complaint to CPAP machine   her blood pressure is normal  denies any palpitation ,  \par \par \par Patient was placed on medication for bipolar since February 2013 \par \par \par

## 2020-09-30 ENCOUNTER — APPOINTMENT (OUTPATIENT)
Dept: BARIATRICS/WEIGHT MGMT | Facility: CLINIC | Age: 65
End: 2020-09-30
Payer: COMMERCIAL

## 2020-09-30 VITALS — WEIGHT: 176 LBS | BODY MASS INDEX: 33.23 KG/M2 | HEIGHT: 61 IN

## 2020-09-30 PROCEDURE — 97803 MED NUTRITION INDIV SUBSEQ: CPT | Mod: 95

## 2020-10-03 ENCOUNTER — RX RENEWAL (OUTPATIENT)
Age: 65
End: 2020-10-03

## 2020-10-12 ENCOUNTER — RX RENEWAL (OUTPATIENT)
Age: 65
End: 2020-10-12

## 2020-10-15 ENCOUNTER — APPOINTMENT (OUTPATIENT)
Dept: BARIATRICS | Facility: CLINIC | Age: 65
End: 2020-10-15
Payer: COMMERCIAL

## 2020-10-15 VITALS — BODY MASS INDEX: 32.47 KG/M2 | WEIGHT: 172 LBS | HEIGHT: 61 IN

## 2020-10-15 PROCEDURE — 99214 OFFICE O/P EST MOD 30 MIN: CPT | Mod: 95

## 2020-10-19 NOTE — REVIEW OF SYSTEMS
[Recent Change In Weight] : ~T recent weight change [Diarrhea] : diarrhea [Negative] : Heme/Lymph [Fever] : no fever [Chills] : no chills [Dysphagia] : no dysphagia [Chest Pain] : no chest pain [Lower Ext Edema] : no lower extremity edema [Wheezing] : no wheezing [Cough] : no cough [SOB on Exertion] : no shortness of breath during exertion [Abdominal Pain] : no abdominal pain [Vomiting] : no vomiting [Constipation] : no constipation [Reflux/Heartburn] : no reflux/ heartburn [Hernia] : no hernia [FreeTextEntry2] : weight loss since last visit.

## 2020-10-19 NOTE — HISTORY OF PRESENT ILLNESS
[Home] : at home, [unfilled] , at the time of the visit. [Medical Office: ([facility])___] : at the medical office located in  [Verbal consent obtained from patient] : the patient, [unfilled] [Date of Surgery: ___] : Date of Surgery:   [unfilled] [Procedure: ___] : Procedure performed: [unfilled]  [Pre-Op Weight ___] : Pre-op weight was [unfilled] lbs [Surgeon Name:   ___] : Surgeon Name: Dr. CAMPOVERDE [de-identified] : 65 year old F 11 month s/p lap sleeve gastrectomy and intra- op EGD and hiatal hernia repair here for a follow up TELEMEDICINE VISIT due to COVID-19 Pandemic. Weight loss since last visit. Current weight is 172 lbs. Pt is feeling well overall and happy with weight loss since surgery. Denies any food intolerances. Pt is consuming consistent protein focus meals and consuming a sufficient amount of zero calorie liquid per day. Pt aware she needs to increase her daily liquid and protein intake Consuming 3 meals per day. Patient is taking vitamin supplements as directed. No change in BM. No reflux symptoms. Exercising regularly incorporating cardio/ will increase strength training and track her daily steps. Scheduled follow up visit with nutritionist on 11/24/2020. Most recent labs were performed in August 2020. Pt is requesting a script to have repeat blood work performed prior to 1 year post op visit.

## 2020-10-19 NOTE — ASSESSMENT
[FreeTextEntry1] : 65 year old F 11 month s/p lap sleeve gastrectomy and intra- op EGD and hiatal hernia repair here for a follow up TELEMEDICINE VISIT due to COVID-19 Pandemic. Weight loss since last visit. Current weight is 172 lbs. Pt is feeling well overall and happy with weight loss since surgery.\par \par Will continue multivitamins and continue protein and liquid intake as instructed.\par Script given for routine blood work to be performed prior to next visit. \par Call for any questions or concerns.\par \par Nutritional counseling has been provided. The patient is encouraged to remain calorie conscious and continue a low fat, low carbohydrate, protein focus diet. Pt encouraged to participate in a daily exercise regimen incorporating cardio and  strength training. \par \par Return to office in 6 weeks or earlier if any concerns.Plan to have routine blood work performed prior to next visit. \par

## 2020-10-19 NOTE — PHYSICAL EXAM
[Obese, well nourished, in no acute distress] : obese, well nourished, in no acute distress [Normal] : affect appropriate [de-identified] : normoactive bowel sounds, soft and non tender, no hepatosplenomegaly or masses appreciated.

## 2020-11-24 ENCOUNTER — APPOINTMENT (OUTPATIENT)
Dept: BARIATRICS/WEIGHT MGMT | Facility: CLINIC | Age: 65
End: 2020-11-24
Payer: COMMERCIAL

## 2020-11-24 VITALS — HEIGHT: 61 IN | WEIGHT: 172 LBS | BODY MASS INDEX: 32.47 KG/M2

## 2020-11-24 PROCEDURE — 97803 MED NUTRITION INDIV SUBSEQ: CPT | Mod: 95

## 2020-12-03 ENCOUNTER — APPOINTMENT (OUTPATIENT)
Dept: BARIATRICS | Facility: CLINIC | Age: 65
End: 2020-12-03
Payer: COMMERCIAL

## 2020-12-03 VITALS — WEIGHT: 166 LBS | HEIGHT: 61 IN | BODY MASS INDEX: 31.34 KG/M2

## 2020-12-03 PROCEDURE — 99214 OFFICE O/P EST MOD 30 MIN: CPT | Mod: 95

## 2020-12-03 NOTE — REVIEW OF SYSTEMS
[Fever] : no fever [Chills] : no chills [Dysphagia] : no dysphagia [Chest Pain] : no chest pain [Lower Ext Edema] : no lower extremity edema [Wheezing] : no wheezing [Cough] : no cough [SOB on Exertion] : no shortness of breath during exertion [Abdominal Pain] : no abdominal pain [Vomiting] : no vomiting [Constipation] : no constipation [Reflux/Heartburn] : no reflux/ heartburn [Hernia] : no hernia [Suicidal] : not suicidal [Insomnia] : no insomnia [Anxiety] : no anxiety [FreeTextEntry2] : weight loss since last visit.  [de-identified] : History of Bipolar Disease stable on medication - currently under the care of a private therapist and psychiatrist.

## 2020-12-03 NOTE — HISTORY OF PRESENT ILLNESS
[de-identified] : 65 year old F > 1 year s/p lap sleeve gastrectomy and intra- op EGD and hiatal hernia repair here for a follow up TELEMEDICINE VISIT due to COVID-19 Pandemic. Weight loss since last visit. Current weight is 166  lbs. Pt is feeling well overall and happy with weight loss since surgery. Denies any food intolerances. Pt is consuming consistent protein focus meals and consuming a sufficient amount of zero calorie liquid per day. Pt believes she is consuming an adequate amount of  liquid and protein during the day. Consuming 3 meals per day. Patient is taking vitamin supplements as directed. No change in BM. No reflux symptoms. Exercising regularly incorporating cardio/ will increase strength training and track her daily steps. Scheduled follow up visit with nutritionist on 11/24/2020. Most recent labs were performed in August 2020. Pt indicated that she recently had routine blood work performed this week - results are still pending.

## 2020-12-03 NOTE — PHYSICAL EXAM
Pt refusing home oxygen.       Mya Caldwell RN  11/15/20 1949 [de-identified] : normoactive bowel sounds, soft and non tender, no hepatosplenomegaly or masses appreciated.

## 2020-12-03 NOTE — ASSESSMENT
[FreeTextEntry1] : 65 year old F >1 year  s/p lap sleeve gastrectomy and intra- op EGD and hiatal hernia repair here for a follow up TELEMEDICINE VISIT due to COVID-19 Pandemic. Weight loss since last visit. Current weight is 166 lbs. Pt is feeling well overall and happy with weight loss since surgery.\par \par Will continue multivitamins and continue protein and liquid intake as instructed.\par \par Call for any questions or concerns.\par \par Nutritional counseling has been provided. The patient is encouraged to remain calorie conscious and continue a low fat, low carbohydrate, protein focus diet. Pt encouraged to participate in a daily exercise regimen incorporating cardio and  strength training. \par \par \par Plan to continue to to follow up with private therapist  and  psychiatrist  as advised. \par Plan to schedule a follow up telemedicine visit with Lynne HOOD in January 2021. \par \par Return to office in 6 weeks or earlier if any concerns.Plan to discuss and review routine blood work when finalized.\par

## 2020-12-22 ENCOUNTER — NON-APPOINTMENT (OUTPATIENT)
Age: 65
End: 2020-12-22

## 2021-01-11 ENCOUNTER — RX RENEWAL (OUTPATIENT)
Age: 66
End: 2021-01-11

## 2021-01-28 ENCOUNTER — APPOINTMENT (OUTPATIENT)
Dept: BARIATRICS | Facility: CLINIC | Age: 66
End: 2021-01-28
Payer: COMMERCIAL

## 2021-01-28 DIAGNOSIS — G47.30 SLEEP APNEA, UNSPECIFIED: ICD-10-CM

## 2021-01-28 PROCEDURE — 99214 OFFICE O/P EST MOD 30 MIN: CPT | Mod: 95

## 2021-02-02 ENCOUNTER — APPOINTMENT (OUTPATIENT)
Dept: BARIATRICS/WEIGHT MGMT | Facility: CLINIC | Age: 66
End: 2021-02-02
Payer: COMMERCIAL

## 2021-02-02 VITALS — WEIGHT: 170 LBS | BODY MASS INDEX: 32.1 KG/M2 | HEIGHT: 61 IN

## 2021-02-02 VITALS — BODY MASS INDEX: 32.1 KG/M2 | HEIGHT: 61 IN | WEIGHT: 170 LBS

## 2021-02-02 PROCEDURE — 97803 MED NUTRITION INDIV SUBSEQ: CPT | Mod: 95

## 2021-02-02 NOTE — REVIEW OF SYSTEMS
[Dysphagia] : no dysphagia [Hoarseness] : no hoarseness [Chest Pain] : no chest pain [Palpitations] : no palpitations [Lower Ext Edema] : no lower extremity edema [Shortness Of Breath] : no shortness of breath [Wheezing] : no wheezing [Cough] : no cough [SOB on Exertion] : no shortness of breath during exertion [Abdominal Pain] : no abdominal pain [Vomiting] : no vomiting [Constipation] : no constipation [Diarrhea] : no diarrhea [Reflux/Heartburn] : no reflux/ heartburn [Suicidal] : not suicidal [Insomnia] : no insomnia [Anxiety] : no anxiety [FreeTextEntry2] : weight gain since last visit.  [de-identified] : History of Bipolar Disease.

## 2021-02-02 NOTE — ASSESSMENT
[FreeTextEntry1] : 65 year old F > 1 year s/p lap sleeve gastrectomy and intra- op EGD and hiatal hernia repair here for a follow up TELEMEDICINE VISIT due to COVID-19 Pandemic. Current weight is 170  lbs. Weight gain since last visit /recent night time snacking . History of Bipolar Disease. \par \par Will continue multivitamins and continue protein and liquid intake as instructed.\par Call for any questions or concerns.\par \par Nutritional counseling has been provided. The patient is encouraged to remain calorie conscious and continue a low fat, low carbohydrate, protein focus diet. Pt encouraged to participate in a daily exercise regimen incorporating cardio and  strength training. \par \par Discussed with patient avoiding processed foods/saturated fats/ refined carbohydrates and importance of front loading calories. Discussed with patient avoiding night time snacking / consuming a protein shake as a meal replacement if needed. \par \par Plan to continue to follow up with private therapist and psychiatrist as advised. \par \par Scheduled follow up telemedicine visit with Lynne HOOD. \par \par \par Return to office in 3 months  or earlier if any concerns.\par

## 2021-02-02 NOTE — HISTORY OF PRESENT ILLNESS
[de-identified] : 65 year old F > 1 year s/p lap sleeve gastrectomy and intra- op EGD and hiatal hernia repair here for a follow up TELEMEDICINE VISIT due to COVID-19 Pandemic. Current weight is 170  lbs. Weight gain since last visit.Pt admits to making some poor food choices during the holiday season including snacking at night and consuming more sweets. Pt is ready to get back on track and focused on consuming 3 meals a day  and consuming a sufficient amount of zero calorie liquid per day. Pt believes she is consuming an adequate amount of  liquid and protein during the day.  Patient is taking vitamin supplements as directed. No change in BM. No reflux symptoms. Exercising regularly incorporating cardio/ will increase strength training and track her daily steps.Pt is planning to schedule a follow up visit with nutritionist next month.

## 2021-02-04 ENCOUNTER — NON-APPOINTMENT (OUTPATIENT)
Age: 66
End: 2021-02-04

## 2021-03-04 ENCOUNTER — NON-APPOINTMENT (OUTPATIENT)
Age: 66
End: 2021-03-04

## 2021-03-04 ENCOUNTER — APPOINTMENT (OUTPATIENT)
Dept: CARDIOLOGY | Facility: CLINIC | Age: 66
End: 2021-03-04
Payer: COMMERCIAL

## 2021-03-04 VITALS
SYSTOLIC BLOOD PRESSURE: 122 MMHG | HEIGHT: 61 IN | WEIGHT: 173 LBS | DIASTOLIC BLOOD PRESSURE: 74 MMHG | BODY MASS INDEX: 32.66 KG/M2 | TEMPERATURE: 98.7 F | OXYGEN SATURATION: 99 % | HEART RATE: 51 BPM

## 2021-03-04 DIAGNOSIS — R00.1 BRADYCARDIA, UNSPECIFIED: ICD-10-CM

## 2021-03-04 PROCEDURE — 99214 OFFICE O/P EST MOD 30 MIN: CPT

## 2021-03-04 PROCEDURE — 93000 ELECTROCARDIOGRAM COMPLETE: CPT

## 2021-03-04 PROCEDURE — 99072 ADDL SUPL MATRL&STAF TM PHE: CPT

## 2021-03-04 NOTE — REVIEW OF SYSTEMS
[Fever] : no fever [Chills] : no chills [Blurry Vision] : no blurred vision [Earache] : no earache [Mouth Sores] : no mouth sores [Shortness Of Breath] : no shortness of breath [Dyspnea on exertion] : not dyspnea during exertion [Chest  Pressure] : no chest pressure [Chest Pain] : no chest pain [Lower Ext Edema] : no extremity edema [Leg Claudication] : no intermittent leg claudication [Cough] : no cough [Wheezing] : no wheezing [see HPI] : see HPI [Incontinence] : no incontinence [Joint Pain] : no joint pain

## 2021-03-04 NOTE — HISTORY OF PRESENT ILLNESS
[FreeTextEntry1] : Patient with morbid obesity , MVP PSVT. bipolar disorder, sleep apnea not compliant to CPAP, hypothyroidism  DM came for follow up says she is doing well , lost weight ,\par \par Patient denies any new complaints , her cholesterol is controlled as well as her sugar   HB A1c  5.7   normal lipids \par \par Patient says her bipolar is under control .Patient is not complaint to CPAP machine   her blood pressure is normal  denies any palpitation ,  \par \par Patient was placed on medication for bipolar since February 2013 \par \par \par

## 2021-03-04 NOTE — REASON FOR VISIT
[Follow-Up - Clinic] : a clinic follow-up of [Hyperlipidemia] : hyperlipidemia [Medication Management] : Medication management [Mitral Regurgitation] : mitral regurgitation [Palpitations] : palpitations [Supraventricular Tachycardia] : supraventricular tachycardia [FreeTextEntry1] : Morbid obesity ,

## 2021-03-30 ENCOUNTER — APPOINTMENT (OUTPATIENT)
Dept: BARIATRICS/WEIGHT MGMT | Facility: CLINIC | Age: 66
End: 2021-03-30

## 2021-04-15 ENCOUNTER — NON-APPOINTMENT (OUTPATIENT)
Age: 66
End: 2021-04-15

## 2021-04-15 ENCOUNTER — APPOINTMENT (OUTPATIENT)
Dept: CARDIOLOGY | Facility: CLINIC | Age: 66
End: 2021-04-15
Payer: COMMERCIAL

## 2021-04-15 VITALS
OXYGEN SATURATION: 99 % | HEART RATE: 52 BPM | DIASTOLIC BLOOD PRESSURE: 70 MMHG | BODY MASS INDEX: 33.42 KG/M2 | SYSTOLIC BLOOD PRESSURE: 126 MMHG | WEIGHT: 177 LBS | HEIGHT: 61 IN

## 2021-04-15 VITALS — SYSTOLIC BLOOD PRESSURE: 126 MMHG | HEART RATE: 50 BPM | DIASTOLIC BLOOD PRESSURE: 70 MMHG | OXYGEN SATURATION: 99 %

## 2021-04-15 PROCEDURE — 93000 ELECTROCARDIOGRAM COMPLETE: CPT

## 2021-04-15 PROCEDURE — 99214 OFFICE O/P EST MOD 30 MIN: CPT

## 2021-04-15 PROCEDURE — 99072 ADDL SUPL MATRL&STAF TM PHE: CPT

## 2021-04-15 NOTE — HISTORY OF PRESENT ILLNESS
[FreeTextEntry1] : Patient with morbid obesity , MVP PSVT. bipolar disorder, sleep apnea not compliant to CPAP, hypothyroidism  DM came with complain that she felt nausea last half of the day yesterday  while working  while sitting in chair , when she got up  she felt unsteady feeling  , denies any headache , no slurry speech , patient was having palpitations , atenolol to  37.5 mg , patient heart rate is low but stable , no evidence of immediate orthostasis ,\par patient does have vertigo , patient blood pressure supine was 120/80 standing is 130/82 \par \par \par Patient denies any new complaints , her cholesterol is controlled as well as her sugar   HB A1c  5.7   normal lipids \par \par Patient says her bipolar is under control .Patient is not complaint to CPAP machine   her blood pressure is normal  denies any palpitation ,  \par \par Patient was placed on medication for bipolar since February 2013 \par \par \par

## 2021-04-15 NOTE — REVIEW OF SYSTEMS
[see HPI] : see HPI [Fever] : no fever [Chills] : no chills [Blurry Vision] : no blurred vision [Earache] : no earache [Mouth Sores] : no mouth sores [Shortness Of Breath] : no shortness of breath [Dyspnea on exertion] : not dyspnea during exertion [Chest  Pressure] : no chest pressure [Chest Pain] : no chest pain [Lower Ext Edema] : no extremity edema [Leg Claudication] : no intermittent leg claudication [Cough] : no cough [Wheezing] : no wheezing [Incontinence] : no incontinence [Joint Pain] : no joint pain

## 2021-04-15 NOTE — PHYSICAL EXAM
[General Appearance - Well Developed] : well developed [Normal Conjunctiva] : the conjunctiva exhibited no abnormalities [Normal Oral Mucosa] : normal oral mucosa [] : no respiratory distress [Respiration, Rhythm And Depth] : normal respiratory rhythm and effort [Exaggerated Use Of Accessory Muscles For Inspiration] : no accessory muscle use [Auscultation Breath Sounds / Voice Sounds] : lungs were clear to auscultation bilaterally [Chest Palpation] : palpation of the chest revealed no abnormalities [Lungs Percussion] : the lungs were normal to percussion [Heart Rate And Rhythm] : heart rate and rhythm were normal [Heart Sounds] : normal S1 and S2 [Edema] : no peripheral edema present [Veins - Varicosity Changes] : no varicosital changes were noted in the lower extremities [Systolic grade ___/6] : A grade [unfilled]/6 systolic murmur was heard. [Bowel Sounds] : normal bowel sounds [Abdomen Soft] : soft [Abdomen Mass (___ Cm)] : no abdominal mass palpated [Abnormal Walk] : normal gait [Nail Clubbing] : no clubbing of the fingernails [Cyanosis, Localized] : no localized cyanosis [Skin Turgor] : normal skin turgor [Oriented To Time, Place, And Person] : oriented to person, place, and time [Normal Appearance] : was normal in appearance [Neck Supple] : was supple [FreeTextEntry1] : MSC

## 2021-04-30 ENCOUNTER — NON-APPOINTMENT (OUTPATIENT)
Age: 66
End: 2021-04-30

## 2021-05-13 ENCOUNTER — APPOINTMENT (OUTPATIENT)
Dept: BARIATRICS | Facility: CLINIC | Age: 66
End: 2021-05-13
Payer: COMMERCIAL

## 2021-05-13 VITALS — HEIGHT: 61 IN | BODY MASS INDEX: 33.04 KG/M2 | WEIGHT: 175 LBS

## 2021-05-13 DIAGNOSIS — R42 DIZZINESS AND GIDDINESS: ICD-10-CM

## 2021-05-13 PROCEDURE — 99214 OFFICE O/P EST MOD 30 MIN: CPT | Mod: 95

## 2021-05-19 NOTE — REVIEW OF SYSTEMS
[Depression] : depression [Negative] : Endocrine [Recent Change In Weight] : ~T recent weight change [Fever] : no fever [Chills] : no chills [Night Sweats] : no night sweats [Fatigue] : no fatigue [Dysphagia] : no dysphagia [Hoarseness] : no hoarseness [Chest Pain] : no chest pain [Palpitations] : no palpitations [Lower Ext Edema] : no lower extremity edema [Shortness Of Breath] : no shortness of breath [Wheezing] : no wheezing [Cough] : no cough [SOB on Exertion] : no shortness of breath during exertion [Abdominal Pain] : no abdominal pain [Vomiting] : no vomiting [Constipation] : no constipation [Diarrhea] : no diarrhea [Reflux/Heartburn] : no reflux/ heartburn [Dysuria] : no dysuria [Incontinence] : no incontinence [Joint Pain] : no joint pain [Joint Stiffness] : no joint stiffness [Suicidal] : not suicidal [Insomnia] : no insomnia [Anxiety] : no anxiety [FreeTextEntry2] : weight gain since last visit.  [de-identified] : History of Bipolar Disease.

## 2021-05-19 NOTE — ASSESSMENT
[FreeTextEntry1] : 65 year old F  s/p lap sleeve gastrectomy and intra- op EGD and hiatal hernia repair here for a follow up TELEMEDICINE VISIT . Current weight is 175 lbs. Weight gain since last visit.Pt reports making better food choices since last visit and is consuming ~ 3 meals per day. History of Bipolar Disease. \par \par Will continue multivitamins and continue protein and liquid intake as instructed.\par Call for any questions or concerns.\par \par Nutritional counseling has been provided. The patient is encouraged to remain calorie conscious and continue a low fat, low carbohydrate, protein focus diet. Pt encouraged to participate in a daily exercise regimen incorporating cardio and  strength training. \par \par Plan to continue to follow up with private therapist and psychiatrist as advised. \par \par [facility] any recent blood work and imaging studies performed. \par \par Will consider obesity medicine consult. \par \par Return to office in 8 weeks  or earlier if any concerns.\par \par Additional time spent  reviewing chart before and after visit. \par

## 2021-05-19 NOTE — PHYSICAL EXAM
[Obese, well nourished, in no acute distress] : obese, well nourished, in no acute distress [Normal] : affect appropriate [de-identified] : EOMI , Anicteric

## 2021-05-19 NOTE — HISTORY OF PRESENT ILLNESS
[Procedure: ___] : Procedure performed: [unfilled]  [Date of Surgery: ___] : Date of Surgery:   [unfilled] [Surgeon Name:   ___] : Surgeon Name: Dr. CAMPOVERDE [Pre-Op Weight ___] : Pre-op weight was [unfilled] lbs [Home] : at home, [unfilled] , at the time of the visit. [Medical Office: ([facility])___] : at the medical office located in  [Verbal consent obtained from patient] : the patient, [unfilled] [de-identified] : 65 year old F  s/p lap sleeve gastrectomy and intra- op EGD and hiatal hernia repair here for a follow up TELEMEDICINE VISIT . Current weight is 175 lbs. Weight gain since last visit.Pt reports making better food choices since last visit and is consuming ~ 3 meals per day. Pt believes she is consuming an adequate amount of protein and  zero calorie liquid per day.  Patient is taking vitamin supplements as directed. No change in BM. No reflux symptoms. Exercising regularly incorporating cardio/ will increase strength training and track her daily steps.Pt is currently under the care of cardiology- hx of SVT - and is concerned that her beta blocker is making her feel tired and contributing to some of the weight gain. Recent history of elevated Alkaline phosphatase - 184. No focal lesion seen on abdominal sonogram.Plan to follow up and repeat labs.

## 2021-06-02 ENCOUNTER — APPOINTMENT (OUTPATIENT)
Dept: ULTRASOUND IMAGING | Facility: CLINIC | Age: 66
End: 2021-06-02
Payer: COMMERCIAL

## 2021-06-02 ENCOUNTER — OUTPATIENT (OUTPATIENT)
Dept: OUTPATIENT SERVICES | Facility: HOSPITAL | Age: 66
LOS: 1 days | End: 2021-06-02
Payer: COMMERCIAL

## 2021-06-02 ENCOUNTER — APPOINTMENT (OUTPATIENT)
Dept: MRI IMAGING | Facility: CLINIC | Age: 66
End: 2021-06-02
Payer: COMMERCIAL

## 2021-06-02 DIAGNOSIS — Z98.51 TUBAL LIGATION STATUS: Chronic | ICD-10-CM

## 2021-06-02 DIAGNOSIS — Z00.8 ENCOUNTER FOR OTHER GENERAL EXAMINATION: ICD-10-CM

## 2021-06-02 PROCEDURE — 70553 MRI BRAIN STEM W/O & W/DYE: CPT

## 2021-06-02 PROCEDURE — 93880 EXTRACRANIAL BILAT STUDY: CPT

## 2021-06-02 PROCEDURE — 70553 MRI BRAIN STEM W/O & W/DYE: CPT | Mod: 26

## 2021-06-02 PROCEDURE — 93880 EXTRACRANIAL BILAT STUDY: CPT | Mod: 26

## 2021-06-02 PROCEDURE — A9585: CPT

## 2021-06-10 DIAGNOSIS — R42 DIZZINESS AND GIDDINESS: ICD-10-CM

## 2021-06-17 ENCOUNTER — APPOINTMENT (OUTPATIENT)
Dept: CARDIOLOGY | Facility: CLINIC | Age: 66
End: 2021-06-17

## 2021-07-08 ENCOUNTER — APPOINTMENT (OUTPATIENT)
Dept: BARIATRICS | Facility: CLINIC | Age: 66
End: 2021-07-08
Payer: COMMERCIAL

## 2021-07-08 VITALS
BODY MASS INDEX: 34.92 KG/M2 | HEART RATE: 73 BPM | HEIGHT: 61 IN | OXYGEN SATURATION: 96 % | DIASTOLIC BLOOD PRESSURE: 70 MMHG | TEMPERATURE: 97.4 F | WEIGHT: 184.96 LBS | SYSTOLIC BLOOD PRESSURE: 130 MMHG

## 2021-07-08 VITALS
OXYGEN SATURATION: 96 % | SYSTOLIC BLOOD PRESSURE: 130 MMHG | HEIGHT: 61 IN | BODY MASS INDEX: 34.92 KG/M2 | WEIGHT: 184.96 LBS | DIASTOLIC BLOOD PRESSURE: 70 MMHG | HEART RATE: 73 BPM | TEMPERATURE: 97.4 F

## 2021-07-08 DIAGNOSIS — R00.2 PALPITATIONS: ICD-10-CM

## 2021-07-08 PROCEDURE — 99214 OFFICE O/P EST MOD 30 MIN: CPT

## 2021-07-08 PROCEDURE — 99072 ADDL SUPL MATRL&STAF TM PHE: CPT

## 2021-07-12 NOTE — REVIEW OF SYSTEMS
[Recent Change In Weight] : ~T recent weight change [Palpitations] : palpitations [Constipation] : constipation [Negative] : Heme/Lymph [Fever] : no fever [Chills] : no chills [Dysphagia] : no dysphagia [Hoarseness] : no hoarseness [Chest Pain] : no chest pain [Lower Ext Edema] : no lower extremity edema [Shortness Of Breath] : no shortness of breath [Wheezing] : no wheezing [Cough] : no cough [SOB on Exertion] : no shortness of breath during exertion [Abdominal Pain] : no abdominal pain [Vomiting] : no vomiting [Diarrhea] : no diarrhea [Reflux/Heartburn] : no reflux/ heartburn [FreeTextEntry2] : weight gain since last visit.  [FreeTextEntry5] : history of heart palpitations a few months - resolved since taking daily Beta Blocker.  [FreeTextEntry7] : history of mild constipation.

## 2021-07-12 NOTE — PHYSICAL EXAM
[Obese, well nourished, in no acute distress] : obese, well nourished, in no acute distress [Normal] : affect appropriate [de-identified] : EOMI , Anicteric  [de-identified] : obese soft non-tender no evidence of hernia

## 2021-07-12 NOTE — ASSESSMENT
[FreeTextEntry1] : 65 year old F  s/p lap sleeve gastrectomy and intra- op EGD and hiatal hernia repair here for a follow up visit. Current weight is 184 lbs. Weight gain since last visit\par \par Will continue multivitamins and continue protein and liquid intake as instructed.\par Call for any questions or concerns.\par \par Nutritional counseling has been provided. The patient is encouraged to remain calorie conscious and continue a low fat, low carbohydrate, protein focus diet.Discussed restarting  daily exercise regimen incorporating cardio and  strength training. \par \par Plan to continue to follow up with private therapist and psychiatrist as advised. \par \par Follow up telemedicine visit with Lynne HOOD scheduled on 8/3/2021.\par \par Referral for  obesity medicine consult. \par \par Return to office in 8 weeks  or earlier if any concerns.Script for blood work to be performed prior to next visit. \par \par Additional time spent  reviewing chart before and after visit. \par

## 2021-07-12 NOTE — HISTORY OF PRESENT ILLNESS
[Procedure: ___] : Procedure performed: [unfilled]  [Date of Surgery: ___] : Date of Surgery:   [unfilled] [Surgeon Name:   ___] : Surgeon Name: Dr. CAMPOVERDE [Pre-Op Weight ___] : Pre-op weight was [unfilled] lbs [de-identified] : 65 year old F  s/p lap sleeve gastrectomy and intra- op EGD and hiatal hernia repair here for a follow up visit. Current weight is 184 lbs. Weight gain since last visit.Pt reports making better food choices since last visit and is consuming ~ 3 meals per day. Pt believes she is consuming an adequate amount of protein and  zero calorie liquid per day.  Patient is taking vitamin supplements as directed. No change in BM. No reflux symptoms. Pt states she has not been exercising recently and is planning to restart.Pt is currently under the care of cardiology- hx of SVT - follow up visit scheduled in September - with Dr. Palla - plan to be placed on a heart monitor for 14 days.  Recent history of elevated Alkaline phosphatase - 184.Plan to follow up and repeat labs. Pt is frustrated with recent weight gain and is requesting to see obesity medicine. Most recent visit with Lynne HOOD was in February 2021 - plan to follow up in  August 3rd 2021.

## 2021-07-29 LAB
25(OH)D3 SERPL-MCNC: 40.9 NG/ML
ALBUMIN SERPL ELPH-MCNC: 4.3 G/DL
ALP BLD-CCNC: 153 U/L
ALT SERPL-CCNC: 27 U/L
ANION GAP SERPL CALC-SCNC: 9 MMOL/L
AST SERPL-CCNC: 19 U/L
BASOPHILS # BLD AUTO: 0.03 K/UL
BASOPHILS NFR BLD AUTO: 0.5 %
BILIRUB SERPL-MCNC: 0.3 MG/DL
BUN SERPL-MCNC: 21 MG/DL
CALCIUM SERPL-MCNC: 10.1 MG/DL
CHLORIDE SERPL-SCNC: 102 MMOL/L
CHOLEST SERPL-MCNC: 182 MG/DL
CO2 SERPL-SCNC: 30 MMOL/L
CREAT SERPL-MCNC: 0.86 MG/DL
EOSINOPHIL # BLD AUTO: 0.19 K/UL
EOSINOPHIL NFR BLD AUTO: 2.9 %
ESTIMATED AVERAGE GLUCOSE: 126 MG/DL
FERRITIN SERPL-MCNC: 18 NG/ML
FOLATE SERPL-MCNC: >20 NG/ML
GLUCOSE SERPL-MCNC: 114 MG/DL
HBA1C MFR BLD HPLC: 6 %
HCT VFR BLD CALC: 44.3 %
HDLC SERPL-MCNC: 75 MG/DL
HGB BLD-MCNC: 13.7 G/DL
IMM GRANULOCYTES NFR BLD AUTO: 0.2 %
IRON SATN MFR SERPL: 17 %
IRON SERPL-MCNC: 69 UG/DL
LDLC SERPL CALC-MCNC: 89 MG/DL
LYMPHOCYTES # BLD AUTO: 2.89 K/UL
LYMPHOCYTES NFR BLD AUTO: 43.9 %
MAN DIFF?: NORMAL
MCHC RBC-ENTMCNC: 28.7 PG
MCHC RBC-ENTMCNC: 30.9 GM/DL
MCV RBC AUTO: 92.9 FL
MONOCYTES # BLD AUTO: 0.44 K/UL
MONOCYTES NFR BLD AUTO: 6.7 %
NEUTROPHILS # BLD AUTO: 3.03 K/UL
NEUTROPHILS NFR BLD AUTO: 45.8 %
NONHDLC SERPL-MCNC: 107 MG/DL
PLATELET # BLD AUTO: 233 K/UL
POTASSIUM SERPL-SCNC: 4.8 MMOL/L
PROT SERPL-MCNC: 6.7 G/DL
RBC # BLD: 4.77 M/UL
RBC # FLD: 12.1 %
SODIUM SERPL-SCNC: 142 MMOL/L
TIBC SERPL-MCNC: 396 UG/DL
TRIGL SERPL-MCNC: 91 MG/DL
TSH SERPL-ACNC: 3.53 UIU/ML
UIBC SERPL-MCNC: 327 UG/DL
VIT A SERPL-MCNC: 54.8 UG/DL
VIT B1 SERPL-MCNC: 169.4 NMOL/L
VIT B12 SERPL-MCNC: 964 PG/ML
VIT B6 SERPL-MCNC: 11.4 UG/L
WBC # FLD AUTO: 6.59 K/UL
ZINC SERPL-MCNC: 87 UG/DL

## 2021-08-08 ENCOUNTER — APPOINTMENT (OUTPATIENT)
Dept: MRI IMAGING | Facility: CLINIC | Age: 66
End: 2021-08-08
Payer: COMMERCIAL

## 2021-08-08 ENCOUNTER — OUTPATIENT (OUTPATIENT)
Dept: OUTPATIENT SERVICES | Facility: HOSPITAL | Age: 66
LOS: 1 days | End: 2021-08-08
Payer: COMMERCIAL

## 2021-08-08 DIAGNOSIS — Z00.8 ENCOUNTER FOR OTHER GENERAL EXAMINATION: ICD-10-CM

## 2021-08-08 DIAGNOSIS — Z98.51 TUBAL LIGATION STATUS: Chronic | ICD-10-CM

## 2021-08-08 PROCEDURE — 72148 MRI LUMBAR SPINE W/O DYE: CPT

## 2021-08-08 PROCEDURE — 72148 MRI LUMBAR SPINE W/O DYE: CPT | Mod: 26

## 2021-08-11 ENCOUNTER — APPOINTMENT (OUTPATIENT)
Dept: BARIATRICS/WEIGHT MGMT | Facility: CLINIC | Age: 66
End: 2021-08-11
Payer: COMMERCIAL

## 2021-08-11 VITALS — WEIGHT: 190 LBS | BODY MASS INDEX: 35.87 KG/M2 | HEIGHT: 61 IN

## 2021-08-11 PROCEDURE — 97803 MED NUTRITION INDIV SUBSEQ: CPT | Mod: 95

## 2021-09-02 ENCOUNTER — APPOINTMENT (OUTPATIENT)
Dept: CARDIOLOGY | Facility: CLINIC | Age: 66
End: 2021-09-02

## 2021-09-13 ENCOUNTER — APPOINTMENT (OUTPATIENT)
Dept: BARIATRICS/WEIGHT MGMT | Facility: CLINIC | Age: 66
End: 2021-09-13

## 2021-11-11 ENCOUNTER — APPOINTMENT (OUTPATIENT)
Dept: BARIATRICS | Facility: CLINIC | Age: 66
End: 2021-11-11
Payer: COMMERCIAL

## 2021-11-11 VITALS
TEMPERATURE: 96.7 F | HEIGHT: 61 IN | DIASTOLIC BLOOD PRESSURE: 70 MMHG | OXYGEN SATURATION: 98 % | SYSTOLIC BLOOD PRESSURE: 130 MMHG | HEART RATE: 52 BPM | BODY MASS INDEX: 37.09 KG/M2 | WEIGHT: 196.43 LBS

## 2021-11-11 PROCEDURE — 99214 OFFICE O/P EST MOD 30 MIN: CPT

## 2021-11-15 NOTE — PHYSICAL EXAM
[Obese] : obese [Normal] : grossly intact [de-identified] : EOMI , Anicteric  [de-identified] : obese soft non-tender no evidence of hernia

## 2021-11-15 NOTE — REVIEW OF SYSTEMS
[Recent Change In Weight] : ~T recent weight change [Constipation] : constipation [Negative] : Heme/Lymph [Fever] : no fever [Chills] : no chills [Dysphagia] : no dysphagia [Hoarseness] : no hoarseness [Chest Pain] : no chest pain [Palpitations] : no palpitations [Lower Ext Edema] : no lower extremity edema [Shortness Of Breath] : no shortness of breath [Wheezing] : no wheezing [Cough] : no cough [SOB on Exertion] : no shortness of breath during exertion [Abdominal Pain] : no abdominal pain [Vomiting] : no vomiting [Diarrhea] : no diarrhea [Reflux/Heartburn] : no reflux/ heartburn [Dysuria] : no dysuria [Incontinence] : no incontinence [Joint Pain] : no joint pain [Joint Stiffness] : no joint stiffness [FreeTextEntry2] : weight gain since last visit.  [FreeTextEntry5] : history of heart palpitations a few months- Resolved.  [FreeTextEntry7] : history of mild constipation.

## 2021-11-15 NOTE — HISTORY OF PRESENT ILLNESS
[Procedure: ___] : Procedure performed: [unfilled]  [Date of Surgery: ___] : Date of Surgery:   [unfilled] [Surgeon Name:   ___] : Surgeon Name: Dr. CAMPOVERDE [Pre-Op Weight ___] : Pre-op weight was [unfilled] lbs [de-identified] : 66 year old F  s/p lap sleeve gastrectomy and intra- op EGD and hiatal hernia repair here for a follow up visit. Current weight is 196 lbs. Weight gain since last visit.Pt reports making better food choices since last visit and is consuming ~ 3 meals per day. Pt occasionally consumes a protein shake as a meal replacement every 2-3 days. Pt believes she is consuming an adequate amount of protein and  zero calorie liquid per day.  Patient is taking vitamin supplements as directed. No change in BM. No reflux symptoms. Pt states she has not been exercising recently and is planning to restart.Pt was under the care of cardiology this past summer - hx of SVT -with Dr. Palla - plan to be placed on a heart monitor for 14 days- pt states she lost monitor before being able to return to be assessed . Pt did not follow up with Dr. Palla as advised. Pt continues to have Alkaline phosphatase monitored.Pt is requesting script to have routine blood work repeated. Pt recently saw nutritionist on   August 11th 2021 and is planning to reschedule visit with obesity medicine.  Pt is currently following up with nephrologist due to history of left renal cyst. Currently sleeping ~7-8 hours at night.

## 2021-11-15 NOTE — ASSESSMENT
[FreeTextEntry1] : 66 year old F  s/p lap sleeve gastrectomy and intra- op EGD and hiatal hernia repair here for a follow up visit. Current weight is 196 lbs. Weight gain since last visit.Pt reports making better food choices since last visit and is consuming ~ 3 meals per day. Pt believes she is consuming an adequate amount of protein and  zero calorie liquid per day.  \par \par Will continue multivitamins and continue protein and liquid intake as instructed.\par Call for any questions or concerns.\par \par Nutritional counseling has been provided. The patient is encouraged to remain calorie conscious and continue a low fat, low carbohydrate, protein focus diet.Discussed restarting  daily exercise regimen incorporating cardio and  strength training. \par \par Plan to continue to follow up with private therapist and psychiatrist as advised. \par \par Follow up telemedicine visit with Lynne HOOD scheduled on 12/14/2021. \par \par Plan to reschedule  obesity medicine consult consult. \par \par Additional time spent  reviewing chart before and after visit. \par  \par Return to office in 4  weeks  or earlier if any concerns.- same day Dr. Granado is in office. Plan to have routine blood work performed prior to next visit. \par \par Additional time spent  reviewing chart before and after visit. \par

## 2021-12-16 ENCOUNTER — APPOINTMENT (OUTPATIENT)
Dept: BARIATRICS/WEIGHT MGMT | Facility: CLINIC | Age: 66
End: 2021-12-16
Payer: COMMERCIAL

## 2021-12-16 ENCOUNTER — APPOINTMENT (OUTPATIENT)
Dept: BARIATRICS | Facility: CLINIC | Age: 66
End: 2021-12-16
Payer: COMMERCIAL

## 2021-12-16 VITALS
WEIGHT: 193.78 LBS | OXYGEN SATURATION: 99 % | DIASTOLIC BLOOD PRESSURE: 68 MMHG | HEIGHT: 61 IN | BODY MASS INDEX: 36.59 KG/M2 | HEART RATE: 65 BPM | TEMPERATURE: 96.4 F | SYSTOLIC BLOOD PRESSURE: 128 MMHG

## 2021-12-16 DIAGNOSIS — R63.4 ABNORMAL WEIGHT LOSS: ICD-10-CM

## 2021-12-16 DIAGNOSIS — R79.89 OTHER SPECIFIED ABNORMAL FINDINGS OF BLOOD CHEMISTRY: ICD-10-CM

## 2021-12-16 PROCEDURE — 99214 OFFICE O/P EST MOD 30 MIN: CPT

## 2021-12-16 PROCEDURE — 97803 MED NUTRITION INDIV SUBSEQ: CPT

## 2021-12-16 RX ORDER — MECLIZINE HYDROCHLORIDE 12.5 MG/1
12.5 TABLET ORAL 3 TIMES DAILY
Qty: 30 | Refills: 5 | Status: DISCONTINUED | COMMUNITY
Start: 2021-04-15 | End: 2021-12-16

## 2021-12-20 VITALS — WEIGHT: 193.78 LBS | HEIGHT: 61 IN | BODY MASS INDEX: 36.59 KG/M2

## 2021-12-20 PROBLEM — R79.89 HIGH SERUM THYROID STIMULATING HORMONE (TSH): Status: ACTIVE | Noted: 2020-04-14

## 2021-12-20 PROBLEM — R79.89 ELEVATED LFTS: Status: ACTIVE | Noted: 2021-05-13

## 2021-12-27 NOTE — ASSESSMENT
[FreeTextEntry1] : 66 year old F  s/p lap sleeve gastrectomy and intra- op EGD and hiatal hernia repair here for a follow up visit. Current weight is 196 lbs. Weight gain since last visit.Pt reports making better food choices since last visit and is consuming ~ 3 meals per day. Pt believes she is consuming an adequate amount of protein and  zero calorie liquid per day.Occasional constipation.\par \par Will continue multivitamins and continue protein and liquid intake as instructed.\par Call for any questions or concerns.\par \par Nutritional counseling has been provided. The patient is encouraged to remain calorie conscious and continue a low fat, low carbohydrate, protein focus diet.Discussed restarting  daily exercise regimen incorporating cardio and  strength training. \par \par Plan to continue to follow up with private therapist and psychiatrist as advised. \par \par Plan to reschedule  obesity medicine consult consult. \par \par Additional time spent  reviewing chart before and after visit. \par  \par Return to office in 8- 12 weeks  or sooner if any concerns or issues. \par \par Discussed and reviewed labs with patient. Elevated TSH - plan to increase daily medication - will discuss with PCP. Alk phosph- elevated ( stable ). \par \par Additional time spent  reviewing chart before and after visit. \par

## 2021-12-27 NOTE — REVIEW OF SYSTEMS
[Recent Change In Weight] : ~T recent weight change [Constipation] : constipation [Negative] : Heme/Lymph [Fever] : no fever [Chills] : no chills [Dysphagia] : no dysphagia [Hoarseness] : no hoarseness [Chest Pain] : no chest pain [Palpitations] : no palpitations [Lower Ext Edema] : no lower extremity edema [Shortness Of Breath] : no shortness of breath [Wheezing] : no wheezing [Cough] : no cough [SOB on Exertion] : no shortness of breath during exertion [Abdominal Pain] : no abdominal pain [Vomiting] : no vomiting [Diarrhea] : no diarrhea [Reflux/Heartburn] : no reflux/ heartburn [Dysuria] : no dysuria [Incontinence] : no incontinence [Joint Pain] : no joint pain [Joint Stiffness] : no joint stiffness [FreeTextEntry2] : weight loss  since last visit.  [FreeTextEntry5] : history of heart palpitations a few months- Resolved.  [FreeTextEntry7] : history of mild constipation.

## 2021-12-27 NOTE — PHYSICAL EXAM
[Obese] : obese [Normal] : affect appropriate [de-identified] : EOMI , Anicteric  [de-identified] : obese soft non-tender no evidence of hernia

## 2021-12-27 NOTE — HISTORY OF PRESENT ILLNESS
[Procedure: ___] : Procedure performed: [unfilled]  [Date of Surgery: ___] : Date of Surgery:   [unfilled] [Surgeon Name:   ___] : Surgeon Name: Dr. CAMPOVERDE [Pre-Op Weight ___] : Pre-op weight was [unfilled] lbs [de-identified] : 66 year old F  s/p lap sleeve gastrectomy and intra- op EGD and hiatal hernia repair here for a follow up visit. Current weight is 193 lbs. Weight loss since last visit.Pt reports making better food choices since last visit and is consuming ~ 3 meals per day. Pt occasionally consumes a protein shake as a meal replacement every 2-3 days. Pt believes she is consuming an adequate amount of protein and  zero calorie liquid per day.  Patient is taking vitamin supplements as directed. No change in BM. No reflux symptoms. Pt states she has not been exercising recently and is planning to restart.Pt was under the care of cardiology this past summer - hx of T -with Dr. Palla - plan to schedule a follow up visit . Pt continues to have Alkaline phosphatase monitored.Recent labs performed. Occasional constipation.  Pt has scheduled visit with Sera HOOD this afternoon and is planning to reschedule visit with obesity medicine.  Pt recently saw  nephrologist due to history of left renal cyst and was advised to follow up with urologist - schedule visit in March 2022. Currently sleeping ~7-8 hours at night. Pt is walking for exercise however is planning to increase daily walking and start some strength training.

## 2022-02-08 LAB
25(OH)D3 SERPL-MCNC: 28.8 NG/ML
ALBUMIN SERPL ELPH-MCNC: 4.3 G/DL
ALP BLD-CCNC: 148 U/L
ALT SERPL-CCNC: 26 U/L
ANION GAP SERPL CALC-SCNC: 12 MMOL/L
AST SERPL-CCNC: 19 U/L
BASOPHILS # BLD AUTO: 0.04 K/UL
BASOPHILS NFR BLD AUTO: 0.6 %
BILIRUB SERPL-MCNC: 0.4 MG/DL
BUN SERPL-MCNC: 20 MG/DL
CALCIUM SERPL-MCNC: 10.3 MG/DL
CHLORIDE SERPL-SCNC: 103 MMOL/L
CHOLEST SERPL-MCNC: 169 MG/DL
CO2 SERPL-SCNC: 28 MMOL/L
CREAT SERPL-MCNC: 0.73 MG/DL
EOSINOPHIL # BLD AUTO: 0.16 K/UL
EOSINOPHIL NFR BLD AUTO: 2.3 %
ESTIMATED AVERAGE GLUCOSE: 120 MG/DL
FERRITIN SERPL-MCNC: 47 NG/ML
FOLATE SERPL-MCNC: >20 NG/ML
GLUCOSE SERPL-MCNC: 109 MG/DL
HBA1C MFR BLD HPLC: 5.8 %
HCT VFR BLD CALC: 43.6 %
HDLC SERPL-MCNC: 54 MG/DL
HGB BLD-MCNC: 13.7 G/DL
IMM GRANULOCYTES NFR BLD AUTO: 0.1 %
IRON SATN MFR SERPL: 25 %
IRON SERPL-MCNC: 85 UG/DL
LDLC SERPL CALC-MCNC: 90 MG/DL
LYMPHOCYTES # BLD AUTO: 3.11 K/UL
LYMPHOCYTES NFR BLD AUTO: 44.4 %
MAN DIFF?: NORMAL
MCHC RBC-ENTMCNC: 29.1 PG
MCHC RBC-ENTMCNC: 31.4 GM/DL
MCV RBC AUTO: 92.6 FL
MONOCYTES # BLD AUTO: 0.48 K/UL
MONOCYTES NFR BLD AUTO: 6.8 %
NEUTROPHILS # BLD AUTO: 3.21 K/UL
NEUTROPHILS NFR BLD AUTO: 45.8 %
NONHDLC SERPL-MCNC: 115 MG/DL
PLATELET # BLD AUTO: 256 K/UL
POTASSIUM SERPL-SCNC: 4.3 MMOL/L
PROT SERPL-MCNC: 7 G/DL
RBC # BLD: 4.71 M/UL
RBC # FLD: 12.2 %
SODIUM SERPL-SCNC: 143 MMOL/L
TIBC SERPL-MCNC: 340 UG/DL
TRIGL SERPL-MCNC: 124 MG/DL
TSH SERPL-ACNC: 6.9 UIU/ML
UIBC SERPL-MCNC: 255 UG/DL
VIT A SERPL-MCNC: 45.5 UG/DL
VIT B1 SERPL-MCNC: 176.8 NMOL/L
VIT B12 SERPL-MCNC: 866 PG/ML
VIT B6 SERPL-MCNC: 12 UG/L
WBC # FLD AUTO: 7.01 K/UL
ZINC SERPL-MCNC: 78 UG/DL

## 2022-03-10 ENCOUNTER — APPOINTMENT (OUTPATIENT)
Dept: BARIATRICS/WEIGHT MGMT | Facility: CLINIC | Age: 67
End: 2022-03-10
Payer: COMMERCIAL

## 2022-03-10 ENCOUNTER — APPOINTMENT (OUTPATIENT)
Dept: BARIATRICS | Facility: CLINIC | Age: 67
End: 2022-03-10
Payer: COMMERCIAL

## 2022-03-10 VITALS
HEIGHT: 61 IN | TEMPERATURE: 97 F | HEART RATE: 73 BPM | WEIGHT: 189.59 LBS | DIASTOLIC BLOOD PRESSURE: 72 MMHG | SYSTOLIC BLOOD PRESSURE: 126 MMHG | BODY MASS INDEX: 35.8 KG/M2 | OXYGEN SATURATION: 99 %

## 2022-03-10 DIAGNOSIS — F31.9 BIPOLAR DISORDER, UNSPECIFIED: ICD-10-CM

## 2022-03-10 DIAGNOSIS — Z92.29 PERSONAL HISTORY OF OTHER DRUG THERAPY: ICD-10-CM

## 2022-03-10 DIAGNOSIS — F32.A DEPRESSION, UNSPECIFIED: ICD-10-CM

## 2022-03-10 PROCEDURE — 97803 MED NUTRITION INDIV SUBSEQ: CPT

## 2022-03-10 PROCEDURE — 99214 OFFICE O/P EST MOD 30 MIN: CPT

## 2022-03-14 NOTE — HISTORY OF PRESENT ILLNESS
[Procedure: ___] : Procedure performed: [unfilled]  [Date of Surgery: ___] : Date of Surgery:   [unfilled] [Surgeon Name:   ___] : Surgeon Name: Dr. CAMPOVERDE [Pre-Op Weight ___] : Pre-op weight was [unfilled] lbs [de-identified] : 66 year old F  s/p lap sleeve gastrectomy and intra- op EGD and hiatal hernia repair here for a follow up visit.  Weight loss since last visit.Pt reports making better food choices since last visit and is consuming ~ 3 meals per day. Pt continues to consume a protein shake as a meal replacement every few days usually for lunch. Pt believes she is consuming an adequate amount of protein and  zero calorie liquid per day.  Patient is taking vitamin supplements as directed. No change in BM. No reflux symptoms. Recent labs performed on 2/28/2022. Occasional constipation. Currently sleeping ~7-8 hours at night. Pt is walking for exercise however is planning to increase daily walking and start some strength training.

## 2022-03-14 NOTE — ASSESSMENT
[FreeTextEntry1] : 66 year old F  s/p lap sleeve gastrectomy and intra- op EGD and hiatal hernia repair here for a follow up visit.  Weight loss since last visit. \par \par Will continue multivitamins and continue protein and liquid intake as instructed.\par Call for any questions or concerns.\par \par Nutritional counseling has been provided. The patient is encouraged to remain calorie conscious and continue a low fat, low carbohydrate, protein focus diet.\par \par Reviewed recent labs performed on 2/28/2022. \par \par Discussed and reviewed constipation remedies with patient. Given Exercise Packet . \par \par Plan to continue to follow up with private therapist and psychiatrist as advised. \par \par Pt met with Sera HOOD today and discussed and reviewed nutritional guidelines and weight loss strategies. \par \par Return to office in 8- 12 weeks  or sooner if any concerns or issues. \par \par Hx of renal cyst - plan to repeat imaging as per MD - will schedule an abdominal sonogram. \par \par Additional time spent  reviewing chart before and after visit. \par

## 2022-03-16 VITALS — BODY MASS INDEX: 35.8 KG/M2 | HEIGHT: 61 IN | WEIGHT: 189.59 LBS

## 2022-04-22 ENCOUNTER — APPOINTMENT (OUTPATIENT)
Dept: CARDIOLOGY | Facility: CLINIC | Age: 67
End: 2022-04-22
Payer: COMMERCIAL

## 2022-04-22 ENCOUNTER — NON-APPOINTMENT (OUTPATIENT)
Age: 67
End: 2022-04-22

## 2022-04-22 VITALS — SYSTOLIC BLOOD PRESSURE: 150 MMHG | DIASTOLIC BLOOD PRESSURE: 80 MMHG

## 2022-04-22 VITALS
BODY MASS INDEX: 33.99 KG/M2 | SYSTOLIC BLOOD PRESSURE: 171 MMHG | OXYGEN SATURATION: 95 % | WEIGHT: 180 LBS | HEIGHT: 61 IN | DIASTOLIC BLOOD PRESSURE: 81 MMHG | HEART RATE: 55 BPM

## 2022-04-22 PROCEDURE — 99214 OFFICE O/P EST MOD 30 MIN: CPT

## 2022-04-22 PROCEDURE — 93000 ELECTROCARDIOGRAM COMPLETE: CPT

## 2022-04-22 RX ORDER — GABAPENTIN 300 MG/1
300 CAPSULE ORAL DAILY
Refills: 0 | Status: ACTIVE | COMMUNITY

## 2022-04-22 NOTE — REASON FOR VISIT
[Arrhythmia/ECG Abnorrmalities] : arrhythmia/ECG abnormalities [Structural Heart and Valve Disease] : structural heart and valve disease [Hyperlipidemia] : hyperlipidemia [Other: ____] : [unfilled] [Hypertension] : hypertension

## 2022-04-22 NOTE — HISTORY OF PRESENT ILLNESS
[FreeTextEntry1] : Patient with morbid obesity , MVP PSVT. bipolar disorder, sleep apnea not compliant to CPAP, hypothyroidism  DM came for follow up says she is doing well  , Patient did have covid in december 2021  recovered \par \par \par Patient denies any new complaints ,denies palpitation ,  her cholesterol is controlled as well as her sugar   HB A1c  5.7   normal lipids  LDL  90 on medication , patient blood pressure is elevated as patient was not compliant to low salt diet , \par \par Patient says her bipolar is under control .Patient is not complaint to CPAP machine   . patient gained weight \par \par Patient was placed on medication for bipolar since February 2013 \par \par \par

## 2022-04-22 NOTE — PHYSICAL EXAM
[Well Developed] : well developed [Obese] : obese [Normal Conjunctiva] : normal conjunctiva [Normal Venous Pressure] : normal venous pressure [5th Left ICS - MCL] : palpated at the 5th LICS in the midclavicular line [No Precordial Heave] : no precordial heave was noted [Normal Rate] : normal [Rhythm Regular] : regular [Normal S1] : normal S1 [Normal S2] : normal S2 [Click] : a ~M click was heard [I] : a grade 1 [II] : a grade 2 [2+] : left 2+ [Soft] : abdomen soft [Non Tender] : non-tender [Normal Gait] : normal gait [No Edema] : no edema [Normal Radial B/L] : normal radial B/L [Normal PT B/L] : normal PT B/L [Normal] : alert and oriented, normal memory [Rt] : no varicose veins of the right leg [Lt] : no varicose veins of the left leg [Right Carotid Bruit] : no bruit heard over the right carotid [Left Carotid Bruit] : no bruit heard over the left carotid [Bruit] : no bruit heard

## 2022-04-22 NOTE — CARDIOLOGY SUMMARY
[de-identified] : 4/22/22  sinus bradycardia ICRBBB [de-identified] : 7/16/2019 Normal chemical stress test  [de-identified] : 6/26/19 EF 60% Mild MR TR Mild AI

## 2022-04-22 NOTE — ASSESSMENT
[FreeTextEntry1] : Patient with above hx \par \par Elevated blood pressure possibly due to non compliance to diet restriction , encourage to follow low salt diet home BP check , echo to rule out LVH , will follow up after 3 months \par \par Palpitations  PSVT episodes , controlled symptoms on medication  improved symptoms   continue atenol 37.5 mg po daily,\par \par MVP with mild MR ,   stable continue to monitor \par \par Mixed hyperlipidemia  controlled after weight  loss , continue atorvastatin 10 mg po daily \par \par Hx of obesity s/p bariatric surgery  gained some weight \par \par DM   controlled , continue \par \par Vertigo  :controlled

## 2022-04-22 NOTE — REVIEW OF SYSTEMS
[Depression] : depression [Fever] : no fever [Headache] : no headache [Blurry Vision] : no blurred vision [Earache] : no earache [Dyspnea on exertion] : not dyspnea during exertion [Chest Discomfort] : no chest discomfort [Cough] : no cough [Dysuria] : no dysuria [Rash] : no rash [Dizziness] : no dizziness [Convulsions] : no convulsions [Confusion] : no confusion was observed [Memory Lapses Or Loss] : no memory lapses or loss [Easy Bleeding] : no tendency for easy bleeding

## 2022-05-03 NOTE — PHYSICAL EXAM
Pt tolerating vent weaning CPAP. HR 88 sinus. /63, RR 17 Sats 100%. On 35% FIO2. Pt has been off Precedex since 1143.  Electronically signed by Jyoti Oliver RN on 5/3/2022 at 12:41 PM [Obese] : obese [Normal] : affect appropriate [de-identified] : EOMI , Anicteric  [de-identified] : obese soft non-tender no evidence of hernia

## 2022-06-14 ENCOUNTER — APPOINTMENT (OUTPATIENT)
Dept: BARIATRICS | Facility: CLINIC | Age: 67
End: 2022-06-14
Payer: COMMERCIAL

## 2022-06-14 VITALS
TEMPERATURE: 96.7 F | WEIGHT: 171.08 LBS | HEART RATE: 58 BPM | DIASTOLIC BLOOD PRESSURE: 82 MMHG | HEIGHT: 61 IN | SYSTOLIC BLOOD PRESSURE: 140 MMHG | OXYGEN SATURATION: 94 % | BODY MASS INDEX: 32.3 KG/M2

## 2022-06-14 DIAGNOSIS — K59.00 CONSTIPATION, UNSPECIFIED: ICD-10-CM

## 2022-06-14 DIAGNOSIS — E66.9 OBESITY, UNSPECIFIED: ICD-10-CM

## 2022-06-14 DIAGNOSIS — Z98.84 BARIATRIC SURGERY STATUS: ICD-10-CM

## 2022-06-14 DIAGNOSIS — R63.5 ABNORMAL WEIGHT GAIN: ICD-10-CM

## 2022-06-14 PROCEDURE — 99214 OFFICE O/P EST MOD 30 MIN: CPT

## 2022-06-16 NOTE — ASSESSMENT
[FreeTextEntry1] : 66 year old F is 2 years s/p LSG with intraop EGD and hiatal hernia repair. Feels great and in a good place currently, since struggled in the past with weight loss. Current weight 171 lbs, weight lost since last visit\par Doing well\par \par Reviewed guidelines about nutrition - protein focus diet\par Increase dietary fiber - reviewed to have snacks with vegetable choices if tolerated\par Continue MVI and daily zero calorie liquid intake 64 oz/day\par Encouraged to participate in a daily exercise regimen incorporating cardio and strength training - preprinted exercise packet given to pt\par Track steps - goal approximately 8-10k steps per day\par Continue benefiber as needed - preprinted constipation remedies sheet given to pt\par \par Last labs performed 12/11/2021, results reviewed with pt - no significant abnormalities, vitamin levels normal \par Labs ordered to be done before next visit\par \par Return to office in 5-6 months - Nov/early Dec\par Nutrition classes as needed\par All questions answered\par \par Pt seen with Dr. Granado\par \par Additional time spent before and after visit reviewing chart

## 2022-06-16 NOTE — HISTORY OF PRESENT ILLNESS
[Procedure: ___] : Procedure performed: [unfilled]  [Date of Surgery: ___] : Date of Surgery:   [unfilled] [Surgeon Name:   ___] : Surgeon Name: Dr. CAMPOVERDE [Pre-Op Weight ___] : Pre-op weight was [unfilled] lbs [___ Years Post Op] : [unfilled] years [de-identified] : 66 year old F is 2 years s/p LSG with intraop EGD and hiatal hernia repair. Feels great and in a good place currently, since struggled in the past with weight loss. Current weight 171 lbs, weight lost since last visit on 3/10/2022. Pt is consuming an adequate amount of protein each meal, consuming 3 meals/day and taking 20-30min to consume each meal. Reports no snacking. Pt is tolerating textured and solid foods without any issues. Drinking adequate zero calorie liquid per day, averaging 64oz/day. Reports BM once every other day, taking benefiber. Pt is taking vitamin supplements as directed. Pt is exercising - walking ~ 30 min/day. Pt is currently sleeping ~ 7 hours/night. No abdominal pain, reflux, nausea, vomiting, constipation or diarrhea.\par \par Pt's PCP recently added norvasc 2.5mg daily for BP control, tolerating medication well.\par

## 2022-06-16 NOTE — PHYSICAL EXAM
[Normal] : affect appropriate [de-identified] : soft, NT, ND, normoactive bowel sounds, no hepatosplenomegaly or masses or diastasis appreciated\par incisions well healed, no drainage or bleeding

## 2022-06-21 ENCOUNTER — NON-APPOINTMENT (OUTPATIENT)
Age: 67
End: 2022-06-21

## 2022-07-30 ENCOUNTER — NON-APPOINTMENT (OUTPATIENT)
Age: 67
End: 2022-07-30

## 2022-08-25 ENCOUNTER — APPOINTMENT (OUTPATIENT)
Dept: CARDIOLOGY | Facility: CLINIC | Age: 67
End: 2022-08-25

## 2022-08-25 ENCOUNTER — NON-APPOINTMENT (OUTPATIENT)
Age: 67
End: 2022-08-25

## 2022-08-25 VITALS — BODY MASS INDEX: 30.78 KG/M2 | WEIGHT: 163 LBS | HEIGHT: 61 IN

## 2022-08-25 VITALS — DIASTOLIC BLOOD PRESSURE: 68 MMHG | HEART RATE: 50 BPM | SYSTOLIC BLOOD PRESSURE: 130 MMHG | OXYGEN SATURATION: 95 %

## 2022-08-25 DIAGNOSIS — E03.9 HYPOTHYROIDISM, UNSPECIFIED: ICD-10-CM

## 2022-08-25 PROCEDURE — 93000 ELECTROCARDIOGRAM COMPLETE: CPT

## 2022-08-25 PROCEDURE — 99214 OFFICE O/P EST MOD 30 MIN: CPT

## 2022-08-25 RX ORDER — AMLODIPINE BESYLATE 2.5 MG/1
2.5 TABLET ORAL DAILY
Refills: 0 | Status: ACTIVE | COMMUNITY

## 2022-08-25 RX ORDER — LAMOTRIGINE 25 MG/1
25 TABLET ORAL DAILY
Refills: 0 | Status: DISCONTINUED | COMMUNITY
End: 2022-08-25

## 2022-08-25 RX ORDER — LAMOTRIGINE 200 MG/1
200 TABLET ORAL DAILY
Refills: 0 | Status: DISCONTINUED | COMMUNITY
End: 2022-08-25

## 2022-08-25 RX ORDER — LEVOTHYROXINE SODIUM 0.05 MG/1
50 TABLET ORAL
Refills: 0 | Status: ACTIVE | COMMUNITY

## 2022-08-25 RX ORDER — LAMOTRIGINE 150 MG/1
150 TABLET ORAL TWICE DAILY
Refills: 0 | Status: ACTIVE | COMMUNITY

## 2022-08-25 RX ORDER — LEVOTHYROXINE SODIUM 0.03 MG/1
25 TABLET ORAL
Refills: 0 | Status: ACTIVE | COMMUNITY

## 2022-08-25 NOTE — HISTORY OF PRESENT ILLNESS
[FreeTextEntry1] : Patient with morbid obesity , MVP PSVT. bipolar disorder, sleep apnea not compliant to CPAP, hypothyroidism  DM came for follow up says she is doing well  ,she lost weight ,after bariatric surgery ,  \par \par \par Patient denies any new complaints ,denies palpitation ,  her cholesterol is controlled as well as her sugar   HB A1c  5.7   normal lipids  LDL  90 on medication , patient blood pressure well controlled  norvasc 2.5 mg po daily \par \par Patient says her bipolar is under control .Patient is not using  CPAP machine   .\par \par Patient was placed on medication for bipolar since February 2013 \par \par \par

## 2022-08-25 NOTE — CARDIOLOGY SUMMARY
[de-identified] : 8/25/22  sinus bradycardia ICRBBB [de-identified] : 7/16/2019 Normal chemical stress test  [de-identified] : 6/26/19 EF 60% Mild MR TR Mild AI

## 2022-08-25 NOTE — ASSESSMENT
[FreeTextEntry1] : Patient with above hx \par \par HYpertension ,  now improved  with weight loss and medication , encourage to follow low salt diet home BP check , echo to rule out LVH , will follow up after 3 months \par \par Palpitations  PSVT episodes , controlled symptoms on medication  improved symptoms   continue atenol 37.5 mg po daily,\par \par MVP with mild MR ,   will obtain echocardiogram to assess severity of valvular disease \par \par Mixed hyperlipidemia  controlled after weight  loss , continue atorvastatin 10 mg po daily \par \par Hx of obesity s/p bariatric surgery  gained some weight \par \par DM   controlled , continue \par \par Vertigo  :controlled  \par \par follow up after 4 months

## 2022-08-30 ENCOUNTER — APPOINTMENT (OUTPATIENT)
Dept: CARDIOLOGY | Facility: CLINIC | Age: 67
End: 2022-08-30

## 2022-08-30 PROCEDURE — 93306 TTE W/DOPPLER COMPLETE: CPT

## 2022-09-07 ENCOUNTER — NON-APPOINTMENT (OUTPATIENT)
Age: 67
End: 2022-09-07

## 2022-12-30 ENCOUNTER — NON-APPOINTMENT (OUTPATIENT)
Age: 67
End: 2022-12-30

## 2022-12-30 ENCOUNTER — APPOINTMENT (OUTPATIENT)
Dept: CARDIOLOGY | Facility: CLINIC | Age: 67
End: 2022-12-30
Payer: COMMERCIAL

## 2022-12-30 VITALS
BODY MASS INDEX: 30.96 KG/M2 | HEART RATE: 51 BPM | OXYGEN SATURATION: 98 % | DIASTOLIC BLOOD PRESSURE: 68 MMHG | HEIGHT: 61 IN | SYSTOLIC BLOOD PRESSURE: 144 MMHG | WEIGHT: 164 LBS

## 2022-12-30 DIAGNOSIS — I34.0 NONRHEUMATIC MITRAL (VALVE) INSUFFICIENCY: ICD-10-CM

## 2022-12-30 PROCEDURE — 99214 OFFICE O/P EST MOD 30 MIN: CPT

## 2022-12-30 PROCEDURE — 93000 ELECTROCARDIOGRAM COMPLETE: CPT

## 2022-12-30 RX ORDER — UBIDECARENONE 200 MG
CAPSULE ORAL DAILY
Refills: 0 | Status: ACTIVE | COMMUNITY

## 2022-12-30 RX ORDER — GUAR GUM 1 G
TABLET ORAL
Refills: 0 | Status: DISCONTINUED | COMMUNITY
End: 2022-12-30

## 2022-12-30 NOTE — ASSESSMENT
[FreeTextEntry1] : Patient with above hx \par \par Hypertension ,  now improved  with weight loss and medication , encourage to follow low salt diet home BP check ,\par Palpitations  PSVT episodes , controlled symptoms on medication  improved symptoms   continue atenol 37.5 mg po daily,\par \par MVP with mild MR ,  mild to moderate AI : compensated ,recommend periodic follow up \par \par Mixed hyperlipidemia  controlled after weight  loss , continue atorvastatin 10 mg po daily \par \par Hx of obesity s/p bariatric surgery  gained some weight \par \par DM   controlled , continue \par \par Vertigo  :controlled  \par \par follow up after 4 months   will obtain her routine blood work report when done by primary care physician

## 2022-12-30 NOTE — REVIEW OF SYSTEMS
[Fever] : no fever [Headache] : no headache [Blurry Vision] : no blurred vision [Earache] : no earache [Dyspnea on exertion] : not dyspnea during exertion [Chest Discomfort] : no chest discomfort [Cough] : no cough [Dysuria] : no dysuria [Rash] : no rash [Dizziness] : no dizziness [Convulsions] : no convulsions [Confusion] : no confusion was observed [Memory Lapses Or Loss] : no memory lapses or loss [Depression] : depression [Easy Bleeding] : no tendency for easy bleeding

## 2022-12-30 NOTE — HISTORY OF PRESENT ILLNESS
[FreeTextEntry1] : Patient with morbid obesity , MVP PSVT. bipolar disorder, sleep apnea not compliant to CPAP, hypothyroidism  DM came for follow up says she is doing well  \par \par \par weight loss stabilized after loosing significant weight after bariatric surgery ,  \par \par Patient denies any new complaints ,denies palpitation ,  her cholesterol is controlled as well as her sugar   HB A1c  5.7   normal lipids  LDL  90 on medication , patient blood pressure well controlled  norvasc 2.5 mg po daily \par \par Patient says her bipolar is under control .Patient is not using  CPAP machine   .\par \par Patient was placed on medication for bipolar since February 2013 \par \par Patient does have sinus bradycardia on ekg , patient denies any dizziness or palpitation , patient is on atenolol , lowering the medication causes palpitations recurrent SVT \par \par Patients echo showed mild to moderate AI , mild TR MR \par \par \par

## 2022-12-30 NOTE — PHYSICAL EXAM
[Well Developed] : well developed [Obese] : obese [Normal Conjunctiva] : normal conjunctiva [Normal Venous Pressure] : normal venous pressure [5th Left ICS - MCL] : palpated at the 5th LICS in the midclavicular line [No Precordial Heave] : no precordial heave was noted [Normal Rate] : normal [Rhythm Regular] : regular [Normal S1] : normal S1 [Normal S2] : normal S2 [Click] : a ~M click was heard [I] : a grade 1 [II] : a grade 2 [Rt] : no varicose veins of the right leg [Lt] : no varicose veins of the left leg [Left Carotid Bruit] : no bruit heard over the left carotid [Right Carotid Bruit] : no bruit heard over the right carotid [2+] : left 2+ [Bruit] : no bruit heard [Soft] : abdomen soft [Non Tender] : non-tender [Normal Gait] : normal gait [No Edema] : no edema [Normal Radial B/L] : normal radial B/L [Normal PT B/L] : normal PT B/L [Normal] : alert and oriented, normal memory

## 2022-12-30 NOTE — CARDIOLOGY SUMMARY
[de-identified] : 12/30/22   sinus bradycardia ICRBBB [de-identified] : 7/16/2019 Normal chemical stress test  [de-identified] : 8/30/22  EF 60% Mild MR TR Mild to moderate AI     RVSP 39 mm hg

## 2023-04-13 ENCOUNTER — APPOINTMENT (OUTPATIENT)
Dept: CARDIOLOGY | Facility: CLINIC | Age: 68
End: 2023-04-13
Payer: MEDICARE

## 2023-04-13 ENCOUNTER — NON-APPOINTMENT (OUTPATIENT)
Age: 68
End: 2023-04-13

## 2023-04-13 VITALS
BODY MASS INDEX: 33.23 KG/M2 | WEIGHT: 176 LBS | HEIGHT: 61 IN | DIASTOLIC BLOOD PRESSURE: 64 MMHG | SYSTOLIC BLOOD PRESSURE: 120 MMHG | OXYGEN SATURATION: 97 % | HEART RATE: 84 BPM

## 2023-04-13 DIAGNOSIS — I34.1 NONRHEUMATIC MITRAL (VALVE) PROLAPSE: ICD-10-CM

## 2023-04-13 DIAGNOSIS — E66.9 OBESITY, UNSPECIFIED: ICD-10-CM

## 2023-04-13 DIAGNOSIS — E78.2 MIXED HYPERLIPIDEMIA: ICD-10-CM

## 2023-04-13 DIAGNOSIS — I35.1 NONRHEUMATIC AORTIC (VALVE) INSUFFICIENCY: ICD-10-CM

## 2023-04-13 DIAGNOSIS — I47.1 SUPRAVENTRICULAR TACHYCARDIA: ICD-10-CM

## 2023-04-13 DIAGNOSIS — I10 ESSENTIAL (PRIMARY) HYPERTENSION: ICD-10-CM

## 2023-04-13 PROCEDURE — 93000 ELECTROCARDIOGRAM COMPLETE: CPT

## 2023-04-13 PROCEDURE — 99214 OFFICE O/P EST MOD 30 MIN: CPT | Mod: 25

## 2023-04-13 NOTE — HISTORY OF PRESENT ILLNESS
[FreeTextEntry1] : Patient with morbid obesity , MVP PSVT. bipolar disorder, sleep apnea not compliant to CPAP, hypothyroidism  DM came for follow up says she is doing well  \par  Patient had gained 12 pounds from December 2023 \par \par Patient denies any new complaints ,denies palpitation ,  her cholesterol is borderline high 198  as well as her sugar   HB A1c  5.9   normal lipids  LDL  103 on medication   done  on march 2023 , , patient blood pressure well controlled  norvasc 2.5 mg po daily \par \par Patient says her bipolar is under control .Patient is not using  CPAP machine   .\par \par Patient was placed on medication for bipolar since February 2013 \par \par Patient does have sinus bradycardia on ekg , patient denies any dizziness or palpitation , patient is on atenolol , lowering the medication causes palpitations recurrent SVT \par \par Patients echo showed mild to moderate AI , mild TR MR \par \par \par

## 2023-04-13 NOTE — CARDIOLOGY SUMMARY
[de-identified] : 4/13/23 atrial rhythm  [de-identified] : 7/16/2019 Normal chemical stress test  [de-identified] : 8/30/22  EF 60% Mild MR TR Mild to moderate AI     RVSP 39 mm hg

## 2023-04-13 NOTE — ASSESSMENT
[FreeTextEntry1] : Patient with above hx \par \par Hypertension ,  now improved  , encourage to follow low salt diet home BP check ,   norvasc 2.5 mg po daily \par \par Palpitations  PSVT episodes , controlled symptoms on medication  improved symptoms   continue atenol 37.5 mg po daily,\par \par MVP with mild MR ,  mild to moderate AI : compensated ,recommend periodic follow up \par \par Mixed hyperlipidemia  controlled after weight  loss , continue atorvastatin 10 mg po daily \par \par Hx of obesity s/p bariatric surgery  gained  12 pounds from last visit ,with mild worsening of Hemoglobin A1c , cholesterol level , encourage patient to be compliant to diet restriction , \par \par DM   controlled , continue \par \par Vertigo  :controlled  \par \par follow up after 4 months

## 2023-06-01 NOTE — PATIENT PROFILE ADULT - ANY SIGNIFICANT CHANGE IN ABILITY TO PERFORM THE FOLLOWING ADL SINCE THE ONSET OF PRESENT ILLNESS?
[FreeTextEntry1] : Pap done\par Self breast exam stressed\par Prescribed bilateral screening mammogram\par Continue clobetasol ointment as directed\par Follow-up yearly or as needed no

## 2023-08-24 ENCOUNTER — APPOINTMENT (OUTPATIENT)
Dept: CARDIOLOGY | Facility: CLINIC | Age: 68
End: 2023-08-24

## 2023-08-29 NOTE — PATIENT PROFILE ADULT - LIVING ENVIRONMENT
HEARING EVALUATION    Name:  Jones Jack  :  2016  Age:  11 y.o. Date of Evaluation: 23     History: School screening  Reason for visit: Jones Jack is being seen today for a school hearing screening. No concerns for hearing. EVALUATION:    Otoscopic Evaluation:   Right Ear: Clear and healthy ear canal and tympanic membrane   Left Ear: Clear and healthy ear canal and tympanic membrane    Audiogram Results:  Pure tone testing revealed normal hearing sensitivity bilaterally. *see attached audiogram      RECOMMENDATIONS:  Return to [facility]. for F/U    PATIENT EDUCATION:   Discussed results and recommendations with Cristobal's mom. Questions were addressed and the patient was encouraged to contact our department should concerns arise.       Destiny Briseno, OSIEL-A  Clinical Audiologist
no

## 2023-11-02 ENCOUNTER — EMERGENCY (EMERGENCY)
Facility: HOSPITAL | Age: 68
LOS: 1 days | Discharge: HOME | End: 2023-11-02
Attending: EMERGENCY MEDICINE | Admitting: EMERGENCY MEDICINE
Payer: MEDICARE

## 2023-11-02 VITALS
RESPIRATION RATE: 16 BRPM | DIASTOLIC BLOOD PRESSURE: 82 MMHG | TEMPERATURE: 98 F | HEIGHT: 62 IN | OXYGEN SATURATION: 94 % | HEART RATE: 65 BPM | WEIGHT: 194.01 LBS | SYSTOLIC BLOOD PRESSURE: 145 MMHG

## 2023-11-02 VITALS
RESPIRATION RATE: 16 BRPM | SYSTOLIC BLOOD PRESSURE: 132 MMHG | DIASTOLIC BLOOD PRESSURE: 69 MMHG | OXYGEN SATURATION: 95 % | HEART RATE: 58 BPM

## 2023-11-02 DIAGNOSIS — Z98.51 TUBAL LIGATION STATUS: Chronic | ICD-10-CM

## 2023-11-02 LAB
ALBUMIN SERPL ELPH-MCNC: 3.3 G/DL — SIGNIFICANT CHANGE UP (ref 3.3–5)
ALBUMIN SERPL ELPH-MCNC: 3.3 G/DL — SIGNIFICANT CHANGE UP (ref 3.3–5)
ALP SERPL-CCNC: 155 U/L — HIGH (ref 30–120)
ALP SERPL-CCNC: 155 U/L — HIGH (ref 30–120)
ALT FLD-CCNC: 28 U/L — SIGNIFICANT CHANGE UP (ref 10–60)
ALT FLD-CCNC: 28 U/L — SIGNIFICANT CHANGE UP (ref 10–60)
ANION GAP SERPL CALC-SCNC: 9 MMOL/L — SIGNIFICANT CHANGE UP (ref 5–17)
ANION GAP SERPL CALC-SCNC: 9 MMOL/L — SIGNIFICANT CHANGE UP (ref 5–17)
AST SERPL-CCNC: 18 U/L — SIGNIFICANT CHANGE UP (ref 10–40)
AST SERPL-CCNC: 18 U/L — SIGNIFICANT CHANGE UP (ref 10–40)
BASOPHILS # BLD AUTO: 0.03 K/UL — SIGNIFICANT CHANGE UP (ref 0–0.2)
BASOPHILS # BLD AUTO: 0.03 K/UL — SIGNIFICANT CHANGE UP (ref 0–0.2)
BASOPHILS NFR BLD AUTO: 0.4 % — SIGNIFICANT CHANGE UP (ref 0–2)
BASOPHILS NFR BLD AUTO: 0.4 % — SIGNIFICANT CHANGE UP (ref 0–2)
BILIRUB SERPL-MCNC: 0.4 MG/DL — SIGNIFICANT CHANGE UP (ref 0.2–1.2)
BILIRUB SERPL-MCNC: 0.4 MG/DL — SIGNIFICANT CHANGE UP (ref 0.2–1.2)
BUN SERPL-MCNC: 13 MG/DL — SIGNIFICANT CHANGE UP (ref 7–23)
BUN SERPL-MCNC: 13 MG/DL — SIGNIFICANT CHANGE UP (ref 7–23)
CALCIUM SERPL-MCNC: 10.2 MG/DL — SIGNIFICANT CHANGE UP (ref 8.4–10.5)
CALCIUM SERPL-MCNC: 10.2 MG/DL — SIGNIFICANT CHANGE UP (ref 8.4–10.5)
CHLORIDE SERPL-SCNC: 102 MMOL/L — SIGNIFICANT CHANGE UP (ref 96–108)
CHLORIDE SERPL-SCNC: 102 MMOL/L — SIGNIFICANT CHANGE UP (ref 96–108)
CO2 SERPL-SCNC: 28 MMOL/L — SIGNIFICANT CHANGE UP (ref 22–31)
CO2 SERPL-SCNC: 28 MMOL/L — SIGNIFICANT CHANGE UP (ref 22–31)
CREAT SERPL-MCNC: 0.77 MG/DL — SIGNIFICANT CHANGE UP (ref 0.5–1.3)
CREAT SERPL-MCNC: 0.77 MG/DL — SIGNIFICANT CHANGE UP (ref 0.5–1.3)
EGFR: 84 ML/MIN/1.73M2 — SIGNIFICANT CHANGE UP
EGFR: 84 ML/MIN/1.73M2 — SIGNIFICANT CHANGE UP
EOSINOPHIL # BLD AUTO: 0.2 K/UL — SIGNIFICANT CHANGE UP (ref 0–0.5)
EOSINOPHIL # BLD AUTO: 0.2 K/UL — SIGNIFICANT CHANGE UP (ref 0–0.5)
EOSINOPHIL NFR BLD AUTO: 2.6 % — SIGNIFICANT CHANGE UP (ref 0–6)
EOSINOPHIL NFR BLD AUTO: 2.6 % — SIGNIFICANT CHANGE UP (ref 0–6)
FLUAV AG NPH QL: SIGNIFICANT CHANGE UP
FLUAV AG NPH QL: SIGNIFICANT CHANGE UP
FLUBV AG NPH QL: SIGNIFICANT CHANGE UP
FLUBV AG NPH QL: SIGNIFICANT CHANGE UP
GLUCOSE SERPL-MCNC: 137 MG/DL — HIGH (ref 70–99)
GLUCOSE SERPL-MCNC: 137 MG/DL — HIGH (ref 70–99)
HCT VFR BLD CALC: 38.9 % — SIGNIFICANT CHANGE UP (ref 34.5–45)
HCT VFR BLD CALC: 38.9 % — SIGNIFICANT CHANGE UP (ref 34.5–45)
HGB BLD-MCNC: 12.7 G/DL — SIGNIFICANT CHANGE UP (ref 11.5–15.5)
HGB BLD-MCNC: 12.7 G/DL — SIGNIFICANT CHANGE UP (ref 11.5–15.5)
IMM GRANULOCYTES NFR BLD AUTO: 0.4 % — SIGNIFICANT CHANGE UP (ref 0–0.9)
IMM GRANULOCYTES NFR BLD AUTO: 0.4 % — SIGNIFICANT CHANGE UP (ref 0–0.9)
LACTATE SERPL-SCNC: 0.9 MMOL/L — SIGNIFICANT CHANGE UP (ref 0.7–2)
LACTATE SERPL-SCNC: 0.9 MMOL/L — SIGNIFICANT CHANGE UP (ref 0.7–2)
LYMPHOCYTES # BLD AUTO: 2.12 K/UL — SIGNIFICANT CHANGE UP (ref 1–3.3)
LYMPHOCYTES # BLD AUTO: 2.12 K/UL — SIGNIFICANT CHANGE UP (ref 1–3.3)
LYMPHOCYTES # BLD AUTO: 27.5 % — SIGNIFICANT CHANGE UP (ref 13–44)
LYMPHOCYTES # BLD AUTO: 27.5 % — SIGNIFICANT CHANGE UP (ref 13–44)
MCHC RBC-ENTMCNC: 29.3 PG — SIGNIFICANT CHANGE UP (ref 27–34)
MCHC RBC-ENTMCNC: 29.3 PG — SIGNIFICANT CHANGE UP (ref 27–34)
MCHC RBC-ENTMCNC: 32.6 GM/DL — SIGNIFICANT CHANGE UP (ref 32–36)
MCHC RBC-ENTMCNC: 32.6 GM/DL — SIGNIFICANT CHANGE UP (ref 32–36)
MCV RBC AUTO: 89.6 FL — SIGNIFICANT CHANGE UP (ref 80–100)
MCV RBC AUTO: 89.6 FL — SIGNIFICANT CHANGE UP (ref 80–100)
MONOCYTES # BLD AUTO: 0.44 K/UL — SIGNIFICANT CHANGE UP (ref 0–0.9)
MONOCYTES # BLD AUTO: 0.44 K/UL — SIGNIFICANT CHANGE UP (ref 0–0.9)
MONOCYTES NFR BLD AUTO: 5.7 % — SIGNIFICANT CHANGE UP (ref 2–14)
MONOCYTES NFR BLD AUTO: 5.7 % — SIGNIFICANT CHANGE UP (ref 2–14)
NEUTROPHILS # BLD AUTO: 4.88 K/UL — SIGNIFICANT CHANGE UP (ref 1.8–7.4)
NEUTROPHILS # BLD AUTO: 4.88 K/UL — SIGNIFICANT CHANGE UP (ref 1.8–7.4)
NEUTROPHILS NFR BLD AUTO: 63.4 % — SIGNIFICANT CHANGE UP (ref 43–77)
NEUTROPHILS NFR BLD AUTO: 63.4 % — SIGNIFICANT CHANGE UP (ref 43–77)
NRBC # BLD: 0 /100 WBCS — SIGNIFICANT CHANGE UP (ref 0–0)
NRBC # BLD: 0 /100 WBCS — SIGNIFICANT CHANGE UP (ref 0–0)
PLATELET # BLD AUTO: 281 K/UL — SIGNIFICANT CHANGE UP (ref 150–400)
PLATELET # BLD AUTO: 281 K/UL — SIGNIFICANT CHANGE UP (ref 150–400)
POTASSIUM SERPL-MCNC: 3.9 MMOL/L — SIGNIFICANT CHANGE UP (ref 3.5–5.3)
POTASSIUM SERPL-MCNC: 3.9 MMOL/L — SIGNIFICANT CHANGE UP (ref 3.5–5.3)
POTASSIUM SERPL-SCNC: 3.9 MMOL/L — SIGNIFICANT CHANGE UP (ref 3.5–5.3)
POTASSIUM SERPL-SCNC: 3.9 MMOL/L — SIGNIFICANT CHANGE UP (ref 3.5–5.3)
PROT SERPL-MCNC: 7.5 G/DL — SIGNIFICANT CHANGE UP (ref 6–8.3)
PROT SERPL-MCNC: 7.5 G/DL — SIGNIFICANT CHANGE UP (ref 6–8.3)
RBC # BLD: 4.34 M/UL — SIGNIFICANT CHANGE UP (ref 3.8–5.2)
RBC # BLD: 4.34 M/UL — SIGNIFICANT CHANGE UP (ref 3.8–5.2)
RBC # FLD: 11.5 % — SIGNIFICANT CHANGE UP (ref 10.3–14.5)
RBC # FLD: 11.5 % — SIGNIFICANT CHANGE UP (ref 10.3–14.5)
RSV RNA NPH QL NAA+NON-PROBE: SIGNIFICANT CHANGE UP
RSV RNA NPH QL NAA+NON-PROBE: SIGNIFICANT CHANGE UP
SARS-COV-2 RNA SPEC QL NAA+PROBE: SIGNIFICANT CHANGE UP
SARS-COV-2 RNA SPEC QL NAA+PROBE: SIGNIFICANT CHANGE UP
SODIUM SERPL-SCNC: 139 MMOL/L — SIGNIFICANT CHANGE UP (ref 135–145)
SODIUM SERPL-SCNC: 139 MMOL/L — SIGNIFICANT CHANGE UP (ref 135–145)
WBC # BLD: 7.7 K/UL — SIGNIFICANT CHANGE UP (ref 3.8–10.5)
WBC # BLD: 7.7 K/UL — SIGNIFICANT CHANGE UP (ref 3.8–10.5)
WBC # FLD AUTO: 7.7 K/UL — SIGNIFICANT CHANGE UP (ref 3.8–10.5)
WBC # FLD AUTO: 7.7 K/UL — SIGNIFICANT CHANGE UP (ref 3.8–10.5)

## 2023-11-02 PROCEDURE — 87637 SARSCOV2&INF A&B&RSV AMP PRB: CPT

## 2023-11-02 PROCEDURE — 99284 EMERGENCY DEPT VISIT MOD MDM: CPT

## 2023-11-02 PROCEDURE — 36415 COLL VENOUS BLD VENIPUNCTURE: CPT

## 2023-11-02 PROCEDURE — 87040 BLOOD CULTURE FOR BACTERIA: CPT

## 2023-11-02 PROCEDURE — 71046 X-RAY EXAM CHEST 2 VIEWS: CPT

## 2023-11-02 PROCEDURE — 99284 EMERGENCY DEPT VISIT MOD MDM: CPT | Mod: 25

## 2023-11-02 PROCEDURE — 85025 COMPLETE CBC W/AUTO DIFF WBC: CPT

## 2023-11-02 PROCEDURE — 71046 X-RAY EXAM CHEST 2 VIEWS: CPT | Mod: 26

## 2023-11-02 PROCEDURE — 96374 THER/PROPH/DIAG INJ IV PUSH: CPT

## 2023-11-02 PROCEDURE — 80053 COMPREHEN METABOLIC PANEL: CPT

## 2023-11-02 PROCEDURE — 83605 ASSAY OF LACTIC ACID: CPT

## 2023-11-02 RX ORDER — ONDANSETRON 8 MG/1
1 TABLET, FILM COATED ORAL
Qty: 0 | Refills: 0 | DISCHARGE

## 2023-11-02 RX ORDER — AZITHROMYCIN 500 MG/1
1 TABLET, FILM COATED ORAL
Qty: 4 | Refills: 0
Start: 2023-11-02 | End: 2023-11-05

## 2023-11-02 RX ORDER — DILTIAZEM HCL 120 MG
1 CAPSULE, EXT RELEASE 24 HR ORAL
Qty: 0 | Refills: 0 | DISCHARGE

## 2023-11-02 RX ORDER — CYST/ALA/Q10/PHOS.SER/DHA/BROC 100-20-50
1 POWDER (GRAM) ORAL
Refills: 0 | DISCHARGE

## 2023-11-02 RX ORDER — MIRTAZAPINE 45 MG/1
1 TABLET, ORALLY DISINTEGRATING ORAL
Qty: 0 | Refills: 0 | DISCHARGE

## 2023-11-02 RX ORDER — OMEPRAZOLE 10 MG/1
1 CAPSULE, DELAYED RELEASE ORAL
Qty: 0 | Refills: 0 | DISCHARGE

## 2023-11-02 RX ORDER — LAMOTRIGINE 25 MG/1
1 TABLET, ORALLY DISINTEGRATING ORAL
Refills: 0 | DISCHARGE

## 2023-11-02 RX ORDER — ATENOLOL 25 MG/1
1.5 TABLET ORAL
Refills: 0 | DISCHARGE

## 2023-11-02 RX ORDER — GABAPENTIN 400 MG/1
1 CAPSULE ORAL
Refills: 0 | DISCHARGE

## 2023-11-02 RX ORDER — GABAPENTIN 400 MG/1
1 CAPSULE ORAL
Qty: 0 | Refills: 0 | DISCHARGE

## 2023-11-02 RX ORDER — OXYCODONE AND ACETAMINOPHEN 5; 325 MG/1; MG/1
1 TABLET ORAL
Qty: 0 | Refills: 0 | DISCHARGE

## 2023-11-02 RX ORDER — LANOLIN ALCOHOL/MO/W.PET/CERES
1 CREAM (GRAM) TOPICAL
Qty: 0 | Refills: 0 | DISCHARGE

## 2023-11-02 RX ORDER — ATORVASTATIN CALCIUM 80 MG/1
1 TABLET, FILM COATED ORAL
Qty: 0 | Refills: 0 | DISCHARGE

## 2023-11-02 RX ORDER — SODIUM CHLORIDE 9 MG/ML
1000 INJECTION INTRAMUSCULAR; INTRAVENOUS; SUBCUTANEOUS ONCE
Refills: 0 | Status: COMPLETED | OUTPATIENT
Start: 2023-11-02 | End: 2023-11-02

## 2023-11-02 RX ORDER — ATORVASTATIN CALCIUM 80 MG/1
1 TABLET, FILM COATED ORAL
Refills: 0 | DISCHARGE

## 2023-11-02 RX ORDER — GUAIFENESIN/DEXTROMETHORPHAN 600MG-30MG
0 TABLET, EXTENDED RELEASE 12 HR ORAL
Refills: 0 | DISCHARGE

## 2023-11-02 RX ORDER — AZITHROMYCIN 500 MG/1
500 TABLET, FILM COATED ORAL ONCE
Refills: 0 | Status: COMPLETED | OUTPATIENT
Start: 2023-11-02 | End: 2023-11-02

## 2023-11-02 RX ORDER — AMLODIPINE BESYLATE 2.5 MG/1
1 TABLET ORAL
Refills: 0 | DISCHARGE

## 2023-11-02 RX ADMIN — SODIUM CHLORIDE 1000 MILLILITER(S): 9 INJECTION INTRAMUSCULAR; INTRAVENOUS; SUBCUTANEOUS at 10:23

## 2023-11-02 RX ADMIN — AZITHROMYCIN 255 MILLIGRAM(S): 500 TABLET, FILM COATED ORAL at 11:13

## 2023-11-02 NOTE — ED PROVIDER NOTE - NSICDXPASTSURGICALHX_GEN_ALL_CORE_FT
PAST SURGICAL HISTORY:  Benign Tumor of Breast right    Fallen Bladder Bladder Sling    H/O tubal ligation     Hernia Right Inguinal  repair    History of Arthroscopy of Knee Right    S/P Dilation and Curettage

## 2023-11-02 NOTE — ED PROVIDER NOTE - PATIENT PORTAL LINK FT
You can access the FollowMyHealth Patient Portal offered by [facility] by registering at the following website: [email or web address]. By joining [employer]’s FollowMyHealth portal, you will also be able to view your health information using other applications (apps) compatible with our system.

## 2023-11-02 NOTE — ED PROVIDER NOTE - OBJECTIVE STATEMENT
68-year-old female with a history of hypertension, prediabetes, bipolar disorder, SVT, high cholesterol, status post gastric sleeve surgery presents with cough/URI over past 10 days.  No acute shortness of breath.  No acute chest pain.  Patient has not yet been on antibiotics.  No abdominal pain.  No vomiting or diarrhea.  No neck or back pain.  No weakness or dizziness.  No aggravating or alleviating factors otherwise noted.  No other acute injury or complaints.  No recent dyspnea on exertion or easy fatigue.

## 2023-11-02 NOTE — ED PROVIDER NOTE - CLINICAL SUMMARY MEDICAL DECISION MAKING FREE TEXT BOX
Cough/URI over past 10 days, no acute fever.  No acute hypoxia.  No chest pain or shortness of breath.  Positive crackles on exam.  Will check labs, x-ray, flu/COVID, outpatient follow-up

## 2023-11-02 NOTE — ED ADULT NURSE NOTE - NSICDXPASTMEDICALHX_GEN_ALL_CORE_FT
PAST MEDICAL HISTORY:  Anxiety     Bipolar 1 disorder     Diabetes mellitus, type 2     H/O insomnia     Hypercholesteremia     IBS (Irritable Bowel Syndrome)     Mild Depression     Mitral Valve Prolapse     Obesity, morbid, BMI 40.0-49.9     PRESTON (Obstructive Sleep Apnea) cpap at night    Sustained Supraventricular Tachycardia on meds     Metronidazole Pregnancy And Lactation Text: This medication is Pregnancy Category B and considered safe during pregnancy.  It is also excreted in breast milk.

## 2023-11-02 NOTE — ED PROVIDER NOTE - CARE PROVIDER_API CALL
Noemy Samuel  Internal Medicine  [address], [address]  [place] [address]  Phone: [phone]  Fax: [phone]  Follow Up Time:

## 2023-11-02 NOTE — ED PROVIDER NOTE - NSFOLLOWUPINSTRUCTIONS_ED_ALL_ED_FT
1. Follow-up with your Primary Medical doctor.  Today for prompt follow-up.  2. Return to Emergency room for any worsening or persistent pain, weakness, fever, dizziness, passing out, difficulty breathing or any other concerning symptoms.  3. See attached instruction sheets for additional information, including information regarding signs and symptoms to look out for, reasons to seek immediate care and other important instructions.  4.  Plenty of fluids  5.  Zithromax once daily for 4 more days

## 2023-11-02 NOTE — ED ADULT NURSE NOTE - NSFALLUNIVINTERV_ED_ALL_ED
Bed/Stretcher in lowest position, wheels locked, appropriate side rails in place/Call bell, personal items and telephone in reach/Instruct patient to call for assistance before getting out of bed/chair/stretcher/Non-slip footwear applied when patient is off stretcher/[place] to call system/Physically safe environment - no spills, clutter or unnecessary equipment/Purposeful proactive rounding/Room/bathroom lighting operational, light cord in reach

## 2023-11-02 NOTE — ED ADULT NURSE NOTE - CAS TRG GENERAL NORM CIRC DET
ER physician Strong peripheral pulses Cyclosporine Pregnancy And Lactation Text: This medication is Pregnancy Category C and it isn't know if it is safe during pregnancy. This medication is excreted in breast milk.

## 2023-11-02 NOTE — ED PROVIDER NOTE - NSICDXPASTMEDICALHX_GEN_ALL_CORE_FT
PAST MEDICAL HISTORY:  Anxiety     Bipolar 1 disorder     Diabetes mellitus, type 2     H/O insomnia     Hypercholesteremia     IBS (Irritable Bowel Syndrome)     Mild Depression     Mitral Valve Prolapse     Obesity, morbid, BMI 40.0-49.9     PRESTON (Obstructive Sleep Apnea) cpap at night    Sustained Supraventricular Tachycardia on meds

## 2023-11-02 NOTE — ED PROVIDER NOTE - PROGRESS NOTE DETAILS
Patient doing well, no acute complaints.  Discussed with patient regarding possibility of infection, will start Zithromax.  Patient will follow-up with primary care doctor soon as possible, and return with any acute changes or concerns.  Discussed with patient regarding infection precautions and instructions, importance of close prompt follow-up, to return with any worsening or persistent symptoms.

## 2023-11-02 NOTE — ED ADULT TRIAGE NOTE - CHIEF COMPLAINT QUOTE
" I have a productive cough for a week now , I don't have any fever . I have been taking  Mucinex - but its not going away "

## 2023-11-02 NOTE — ED ADULT NURSE NOTE - BREATH SOUNDS, RIGHT
"Chief Complaint   Patient presents with     Foreign Body in Eye     unsure what ? 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    Weight as of this encounter: 142 lb 14.4 oz (64.819 kg).  BP completed using cuff size: regular    "
wheezes

## 2023-11-02 NOTE — ED PROVIDER NOTE - MUSCULOSKELETAL, MLM
Spine appears normal, range of motion is not limited, no muscle or joint tenderness, no calf tend,no swelling

## 2023-11-02 NOTE — ED ADULT NURSE NOTE - NEURO MENTATION
normal Sent from [facility] for IV Vanc x 1 dose and basic labs/blood cultures. Discussed plan with Dr. Servin. Will DC with addition of bactrim and increase Keflex dose to 4x daily (vs 2)

## 2023-11-02 NOTE — ED ADULT NURSE NOTE - OBJECTIVE STATEMENT
Have Your Skin Lesions Been Treated?: not been treated Is This A New Presentation, Or A Follow-Up?: Skin Lesions How Severe Is Your Skin Lesion?: moderate cough for 10 days. productive of yellow- green mucus. lungs insp and exp wheezing bibasilarly and more to right base to ascultation

## 2023-11-07 LAB
CULTURE RESULTS: SIGNIFICANT CHANGE UP
SPECIMEN SOURCE: SIGNIFICANT CHANGE UP

## 2024-02-12 NOTE — CHART NOTE - NSCHARTNOTEFT_GEN_A_CORE
Chief Complaint(s) and History of Present Illness(es)       Cataract              Laterality: left eye    Associated symptoms: blurred vision    Treatments tried: glasses    Comments: Referred by optometrist Dr. Griffin for low vision in the left eye on 1/12/24. Pt WGFT, got new glasses 1 week ago. Dad says pt can see well with his right eye but not with his left eye. Dad notices pt closes left eye and rubs eye often.   Phx: Pt developed a fever and then  had a seizer at 2 year old and developed the cataract.  12/1/22 [place] laser procedure on left eye                 Comments    Pt was with his mom in [place] (2023) and met with an ophthalmologist that said they could not do surgery on pt. Dad wants a second opinion on treatment.   No family history of strabismus or ocular disease. Pt born full term, no complications at birth.   Inf;  and dad              History was obtained from the following independent historians: Patient & Dad with an  translating throughout the encounter.    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   - New glasses prescribed: full time for monocular precautions. Emphasized.   - left exotropia can be addressed surgically if this bothers Andreas when he is older        Return in about 1 year (around 2/12/2025) for DFE & CRx.    There are no Patient Instructions on file for this visit.    Visit Diagnoses & Orders    ICD-10-CM    1. 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 Intermittent monocular exotropia of left  Screening Medical Evaluation  Patient Admitted from: [facility] admitting diagnosis: Bipolar disorder     PAST MEDICAL & SURGICAL HISTORY:  PRESTON (Obstructive Sleep Apnea): Diagnosed, no Machine  IBS (Irritable Bowel Syndrome)  Hypercholesteremia  Anxiety  Mild Depression  Sustained Supraventricular Tachycardia  Mitral Valve Prolapse  Hernia: Right Inguinal  repair  History of Arthroscopy of Knee: Right  Fallen Bladder: Bladder Sling  Benign Tumor of Breast: right  S/P Dilation and Curettage        Allergies    Beef (Unknown)  sulfa drugs (Hives; Urticaria; Rash)    Intolerances        Social History:     FAMILY HISTORY:  Father - diabetes, ITP (ideopathic thrombocytopenic purpura)      MEDICATIONS  (STANDING):  lamoTRIgine 200 milliGRAM(s) Oral at bedtime  metFORMIN 500 milliGRAM(s) Oral with dinner  ATENolol  Tablet 25 milliGRAM(s) Oral daily  cyanocobalamin 500 MICROGram(s) Oral daily  diltiazem    milliGRAM(s) Oral daily  atorvastatin 10 milliGRAM(s) Oral at bedtime  gabapentin 100 milliGRAM(s) Oral three times a day  DULoxetine 20 milliGRAM(s) Oral daily  mirtazapine 30 milliGRAM(s) Oral at bedtime    MEDICATIONS  (PRN):  diphenhydrAMINE   Injectable 50 milliGRAM(s) IntraMuscular every 6 hours PRN Agitation  haloperidol    Injectable 5 milliGRAM(s) IntraMuscular every 6 hours PRN Agitation  haloperidol     Tablet 5 milliGRAM(s) Oral every 6 hours PRN Agitation  diphenhydrAMINE   Capsule 50 milliGRAM(s) Oral every 6 hours PRN agitation  hydrOXYzine hydrochloride 50 milliGRAM(s) Oral every 6 hours PRN Anxiety  LORazepam     Tablet 0.5 milliGRAM(s) Oral once PRN insomnia at bedtime        CAPILLARY BLOOD GLUCOSE  170 (15 Sep 2017 07:46)        I&O's Summary    Vital Signs Last 24 Hrs  T(C): 36.6 (15 Sep 2017 08:43), Max: 36.6 (15 Sep 2017 08:43)  T(F): 97.9 (15 Sep 2017 08:43), Max: 97.9 (15 Sep 2017 08:43)  HR: 68 (15 Sep 2017 08:43) (68 - 68)  BP: 120/90 (15 Sep 2017 08:43) (120/90 - 120/90)  BP(mean): --  RR: --  SpO2: --      PHYSICAL EXAM:  GENERAL: NAD, well-developed  HEAD:  Atraumatic, Normocephalic  EYES: EOMI, PERRLA, conjunctiva and sclera clear  NECK: Supple, No JVD  CHEST/LUNG: Clear to auscultation bilaterally; No wheeze  HEART: Regular rate and rhythm; No murmurs, rubs, or gallops  ABDOMEN: Soft, Nontender, Nondistended; Bowel sounds present  EXTREMITIES:  2+ Peripheral Pulses, No clubbing, cyanosis, or edema  PSYCH: AAOx3  NEUROLOGY: non-focal  SKIN: No rashes or lesions      Assessment and Plan:   60 y/o F with a PMHx of bipolar disorder, SVT on Cardizem, DM, HLD, Vit B12 deficiency, sleep apnea. presented  to the [facility] ED on 9/8/2017  c/o worsening anxiety, depression with SI. Pt stated at the time that she takes Xanax at night to help her sleep and thought about taking x45 Xanax last night. Pt stated that she has an empty feeling inside, not experiencing any pain, currently calm, was able to provide adequate hx at the time.   Pt transferred to [facility] for further evaluation and safe environment.     Bipolar Disorder -  continue with psych recommendations.    SVT - continue with cardizem and atenolol,  hold for HR <60 and SBP <100    Diabetes - continue with  metformin    Sleep apnea - pt denies wearing cpap mask.      Hyperlipidemia - continue with lipitor    Vitamin B12 deficiency - continue with cyanocobalamin 10mg QHS. eye  H50.332       3. Blindness of left eye with normal vision in contralateral eye  H54.40       4. 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  I personally obtained the chief complaint(s) and history of present illness.  I confirmed and edited as necessary the review of systems, past medical/surgical history, family history, social history, and examination findings as documented by others; and I examined the patient myself.  I personally reviewed the relevant tests, images, and reports as documented above.  I formulated and edited as necessary the assessment and plan and discussed the findings and management plan with the patient and family. - Hari Pereira Jr., MD

## 2024-02-18 NOTE — ED PROVIDER NOTE - DATA REVIEWED, MDM
Patient slipped and fell while talking out the trash tonight. Hit her head on the concrete. Denies any LOC. Laceration noted to L eyebrow upon arrival to ED  
vital signs/nurses notes

## 2024-05-01 ENCOUNTER — RX RENEWAL (OUTPATIENT)
Age: 69
End: 2024-05-01

## 2024-05-01 RX ORDER — ATENOLOL 25 MG/1
25 TABLET ORAL DAILY
Qty: 45 | Refills: 0 | Status: ACTIVE | COMMUNITY
Start: 2024-05-01 | End: 1900-01-01

## 2024-05-15 NOTE — PRE-OP CHECKLIST - ISOLATION PRECAUTIONS
[FreeTextEntry1] : 79 year old female, former smoker, with PMHx of HTN, HLD, DM presents for a cardiac evaluation. At times patient has ripping burning chest pain. It is infrequent and no obvious triggers. This can be associated with elevated HR.  There is no history of MI, CVA, CHF, or previous coronary intervention.
none

## 2024-07-18 ENCOUNTER — INPATIENT (INPATIENT)
Facility: HOSPITAL | Age: 69
LOS: 0 days | Discharge: HOME | DRG: 149 | End: 2024-07-19
Attending: INTERNAL MEDICINE | Admitting: INTERNAL MEDICINE
Payer: MEDICARE

## 2024-07-18 ENCOUNTER — OUTPATIENT (OUTPATIENT)
Dept: OUTPATIENT SERVICES | Facility: HOSPITAL | Age: 69
LOS: 1 days | End: 2024-07-18
Payer: COMMERCIAL

## 2024-07-18 ENCOUNTER — RESULT REVIEW (OUTPATIENT)
Age: 69
End: 2024-07-18

## 2024-07-18 VITALS
TEMPERATURE: 98 F | RESPIRATION RATE: 18 BRPM | OXYGEN SATURATION: 98 % | SYSTOLIC BLOOD PRESSURE: 161 MMHG | DIASTOLIC BLOOD PRESSURE: 73 MMHG | HEART RATE: 72 BPM

## 2024-07-18 DIAGNOSIS — E03.9 HYPOTHYROIDISM, UNSPECIFIED: ICD-10-CM

## 2024-07-18 DIAGNOSIS — E78.5 HYPERLIPIDEMIA, UNSPECIFIED: ICD-10-CM

## 2024-07-18 DIAGNOSIS — Z98.51 TUBAL LIGATION STATUS: Chronic | ICD-10-CM

## 2024-07-18 DIAGNOSIS — R42 DIZZINESS AND GIDDINESS: ICD-10-CM

## 2024-07-18 DIAGNOSIS — F31.9 BIPOLAR DISORDER, UNSPECIFIED: ICD-10-CM

## 2024-07-18 DIAGNOSIS — I10 ESSENTIAL (PRIMARY) HYPERTENSION: ICD-10-CM

## 2024-07-18 LAB
ALBUMIN SERPL ELPH-MCNC: 3.8 G/DL — SIGNIFICANT CHANGE UP (ref 3.3–5)
ALP SERPL-CCNC: 166 U/L — HIGH (ref 30–120)
ALT FLD-CCNC: 36 U/L — SIGNIFICANT CHANGE UP (ref 10–60)
ANION GAP SERPL CALC-SCNC: 8 MMOL/L — SIGNIFICANT CHANGE UP (ref 5–17)
APTT BLD: 29.5 SEC — SIGNIFICANT CHANGE UP (ref 24.5–35.6)
AST SERPL-CCNC: 22 U/L — SIGNIFICANT CHANGE UP (ref 10–40)
BASOPHILS # BLD AUTO: 0.04 K/UL — SIGNIFICANT CHANGE UP (ref 0–0.2)
BASOPHILS NFR BLD AUTO: 0.6 % — SIGNIFICANT CHANGE UP (ref 0–2)
BILIRUB SERPL-MCNC: 0.4 MG/DL — SIGNIFICANT CHANGE UP (ref 0.2–1.2)
BUN SERPL-MCNC: 14 MG/DL — SIGNIFICANT CHANGE UP (ref 7–23)
CALCIUM SERPL-MCNC: 10.3 MG/DL — SIGNIFICANT CHANGE UP (ref 8.4–10.5)
CHLORIDE SERPL-SCNC: 105 MMOL/L — SIGNIFICANT CHANGE UP (ref 96–108)
CO2 SERPL-SCNC: 28 MMOL/L — SIGNIFICANT CHANGE UP (ref 22–31)
CREAT SERPL-MCNC: 0.83 MG/DL — SIGNIFICANT CHANGE UP (ref 0.5–1.3)
EGFR: 77 ML/MIN/1.73M2 — SIGNIFICANT CHANGE UP
EOSINOPHIL # BLD AUTO: 0.21 K/UL — SIGNIFICANT CHANGE UP (ref 0–0.5)
EOSINOPHIL NFR BLD AUTO: 3.2 % — SIGNIFICANT CHANGE UP (ref 0–6)
GLUCOSE SERPL-MCNC: 174 MG/DL — HIGH (ref 70–99)
HCT VFR BLD CALC: 41.6 % — SIGNIFICANT CHANGE UP (ref 34.5–45)
HGB BLD-MCNC: 13.9 G/DL — SIGNIFICANT CHANGE UP (ref 11.5–15.5)
IMM GRANULOCYTES NFR BLD AUTO: 0.2 % — SIGNIFICANT CHANGE UP (ref 0–0.9)
INR BLD: 0.99 RATIO — SIGNIFICANT CHANGE UP (ref 0.85–1.18)
LIDOCAIN IGE QN: 18 U/L — SIGNIFICANT CHANGE UP (ref 16–77)
LYMPHOCYTES # BLD AUTO: 2.56 K/UL — SIGNIFICANT CHANGE UP (ref 1–3.3)
LYMPHOCYTES # BLD AUTO: 39 % — SIGNIFICANT CHANGE UP (ref 13–44)
MAGNESIUM SERPL-MCNC: 2.2 MG/DL — SIGNIFICANT CHANGE UP (ref 1.6–2.6)
MCHC RBC-ENTMCNC: 29.9 PG — SIGNIFICANT CHANGE UP (ref 27–34)
MCHC RBC-ENTMCNC: 33.4 GM/DL — SIGNIFICANT CHANGE UP (ref 32–36)
MCV RBC AUTO: 89.5 FL — SIGNIFICANT CHANGE UP (ref 80–100)
MONOCYTES # BLD AUTO: 0.35 K/UL — SIGNIFICANT CHANGE UP (ref 0–0.9)
MONOCYTES NFR BLD AUTO: 5.3 % — SIGNIFICANT CHANGE UP (ref 2–14)
NEUTROPHILS # BLD AUTO: 3.4 K/UL — SIGNIFICANT CHANGE UP (ref 1.8–7.4)
NEUTROPHILS NFR BLD AUTO: 51.7 % — SIGNIFICANT CHANGE UP (ref 43–77)
NRBC # BLD: 0 /100 WBCS — SIGNIFICANT CHANGE UP (ref 0–0)
PLATELET # BLD AUTO: 221 K/UL — SIGNIFICANT CHANGE UP (ref 150–400)
POTASSIUM SERPL-MCNC: 3.8 MMOL/L — SIGNIFICANT CHANGE UP (ref 3.5–5.3)
POTASSIUM SERPL-SCNC: 3.8 MMOL/L — SIGNIFICANT CHANGE UP (ref 3.5–5.3)
PROT SERPL-MCNC: 7.5 G/DL — SIGNIFICANT CHANGE UP (ref 6–8.3)
PROTHROM AB SERPL-ACNC: 11.1 SEC — SIGNIFICANT CHANGE UP (ref 9.5–13)
RBC # BLD: 4.65 M/UL — SIGNIFICANT CHANGE UP (ref 3.8–5.2)
RBC # FLD: 12.1 % — SIGNIFICANT CHANGE UP (ref 10.3–14.5)
SODIUM SERPL-SCNC: 141 MMOL/L — SIGNIFICANT CHANGE UP (ref 135–145)
TROPONIN I, HIGH SENSITIVITY RESULT: <4 NG/L — SIGNIFICANT CHANGE UP
WBC # BLD: 6.57 K/UL — SIGNIFICANT CHANGE UP (ref 3.8–10.5)
WBC # FLD AUTO: 6.57 K/UL — SIGNIFICANT CHANGE UP (ref 3.8–10.5)

## 2024-07-18 PROCEDURE — 70551 MRI BRAIN STEM W/O DYE: CPT | Mod: 26

## 2024-07-18 PROCEDURE — 93306 TTE W/DOPPLER COMPLETE: CPT | Mod: 26

## 2024-07-18 PROCEDURE — 70496 CT ANGIOGRAPHY HEAD: CPT | Mod: 26,MC

## 2024-07-18 PROCEDURE — 70551 MRI BRAIN STEM W/O DYE: CPT

## 2024-07-18 PROCEDURE — 70498 CT ANGIOGRAPHY NECK: CPT | Mod: 26,MC

## 2024-07-18 PROCEDURE — 93010 ELECTROCARDIOGRAM REPORT: CPT

## 2024-07-18 PROCEDURE — 76376 3D RENDER W/INTRP POSTPROCES: CPT | Mod: 26

## 2024-07-18 PROCEDURE — 93356 MYOCRD STRAIN IMG SPCKL TRCK: CPT

## 2024-07-18 PROCEDURE — 99285 EMERGENCY DEPT VISIT HI MDM: CPT

## 2024-07-18 RX ORDER — LEVOTHYROXINE SODIUM 25 MCG
50 TABLET ORAL
Refills: 0 | Status: DISCONTINUED | OUTPATIENT
Start: 2024-07-18 | End: 2024-07-19

## 2024-07-18 RX ORDER — ATORVASTATIN CALCIUM 20 MG/1
20 TABLET, FILM COATED ORAL AT BEDTIME
Refills: 0 | Status: DISCONTINUED | OUTPATIENT
Start: 2024-07-18 | End: 2024-07-19

## 2024-07-18 RX ORDER — LEVOTHYROXINE SODIUM 25 MCG
1 TABLET ORAL
Refills: 0 | DISCHARGE

## 2024-07-18 RX ORDER — MIRTAZAPINE 15 MG/1
1 TABLET, FILM COATED ORAL
Refills: 0 | DISCHARGE

## 2024-07-18 RX ORDER — AMLODIPINE BESYLATE 2.5 MG/1
1 TABLET ORAL
Refills: 0 | DISCHARGE

## 2024-07-18 RX ORDER — GABAPENTIN
300 POWDER (GRAM) MISCELLANEOUS AT BEDTIME
Refills: 0 | Status: DISCONTINUED | OUTPATIENT
Start: 2024-07-18 | End: 2024-07-19

## 2024-07-18 RX ORDER — GABAPENTIN
1 POWDER (GRAM) MISCELLANEOUS
Refills: 0 | DISCHARGE

## 2024-07-18 RX ORDER — METHYLPREDNISOLONE ACETATE 20 MG/ML
125 VIAL (ML) INJECTION ONCE
Refills: 0 | Status: COMPLETED | OUTPATIENT
Start: 2024-07-18 | End: 2024-07-18

## 2024-07-18 RX ORDER — ENOXAPARIN SODIUM 100 MG/ML
40 INJECTION SUBCUTANEOUS EVERY 24 HOURS
Refills: 0 | Status: DISCONTINUED | OUTPATIENT
Start: 2024-07-19 | End: 2024-07-19

## 2024-07-18 RX ORDER — ATENOLOL 50 MG/1
37.5 TABLET ORAL DAILY
Refills: 0 | Status: DISCONTINUED | OUTPATIENT
Start: 2024-07-18 | End: 2024-07-19

## 2024-07-18 RX ORDER — LAMOTRIGINE 100 MG/1
200 TABLET ORAL
Refills: 0 | Status: DISCONTINUED | OUTPATIENT
Start: 2024-07-18 | End: 2024-07-19

## 2024-07-18 RX ORDER — ATORVASTATIN CALCIUM 20 MG/1
1 TABLET, FILM COATED ORAL
Refills: 0 | DISCHARGE

## 2024-07-18 RX ORDER — LAMOTRIGINE 100 MG/1
1 TABLET ORAL
Refills: 0 | DISCHARGE

## 2024-07-18 RX ORDER — AMLODIPINE BESYLATE 2.5 MG/1
5 TABLET ORAL DAILY
Refills: 0 | Status: DISCONTINUED | OUTPATIENT
Start: 2024-07-18 | End: 2024-07-19

## 2024-07-18 RX ORDER — MAGNESIUM, ALUMINUM HYDROXIDE 400-400
30 TABLET,CHEWABLE ORAL EVERY 4 HOURS
Refills: 0 | Status: DISCONTINUED | OUTPATIENT
Start: 2024-07-18 | End: 2024-07-19

## 2024-07-18 RX ORDER — ASPIRIN 325 MG/1
324 TABLET, FILM COATED ORAL ONCE
Refills: 0 | Status: COMPLETED | OUTPATIENT
Start: 2024-07-18 | End: 2024-07-18

## 2024-07-18 RX ORDER — MECLIZINE HCL 25 MG
25 TABLET ORAL ONCE
Refills: 0 | Status: COMPLETED | OUTPATIENT
Start: 2024-07-18 | End: 2024-07-18

## 2024-07-18 RX ORDER — MIRTAZAPINE 15 MG/1
45 TABLET, FILM COATED ORAL AT BEDTIME
Refills: 0 | Status: DISCONTINUED | OUTPATIENT
Start: 2024-07-18 | End: 2024-07-19

## 2024-07-18 RX ORDER — ASPIRIN 325 MG/1
81 TABLET, FILM COATED ORAL DAILY
Refills: 0 | Status: DISCONTINUED | OUTPATIENT
Start: 2024-07-19 | End: 2024-07-19

## 2024-07-18 RX ORDER — ACETAMINOPHEN 325 MG
650 TABLET ORAL EVERY 6 HOURS
Refills: 0 | Status: DISCONTINUED | OUTPATIENT
Start: 2024-07-18 | End: 2024-07-19

## 2024-07-18 RX ORDER — DIAZEPAM 10 MG/1
5 TABLET ORAL ONCE
Refills: 0 | Status: DISCONTINUED | OUTPATIENT
Start: 2024-07-18 | End: 2024-07-18

## 2024-07-18 RX ORDER — ONDANSETRON HYDROCHLORIDE 2 MG/ML
4 INJECTION INTRAMUSCULAR; INTRAVENOUS EVERY 6 HOURS
Refills: 0 | Status: DISCONTINUED | OUTPATIENT
Start: 2024-07-18 | End: 2024-07-19

## 2024-07-18 RX ORDER — LEVOTHYROXINE SODIUM 25 MCG
25 TABLET ORAL
Refills: 0 | Status: DISCONTINUED | OUTPATIENT
Start: 2024-07-18 | End: 2024-07-19

## 2024-07-18 RX ADMIN — Medication 1 TABLET(S): at 17:17

## 2024-07-18 RX ADMIN — Medication 25 MILLIGRAM(S): at 10:59

## 2024-07-18 RX ADMIN — Medication 125 MILLIGRAM(S): at 10:08

## 2024-07-18 RX ADMIN — MIRTAZAPINE 45 MILLIGRAM(S): 15 TABLET, FILM COATED ORAL at 21:07

## 2024-07-18 RX ADMIN — Medication 300 MILLIGRAM(S): at 21:06

## 2024-07-18 RX ADMIN — LAMOTRIGINE 200 MILLIGRAM(S): 100 TABLET ORAL at 17:17

## 2024-07-18 RX ADMIN — Medication 5 MILLIGRAM(S): at 21:06

## 2024-07-18 RX ADMIN — Medication 25 MILLIGRAM(S): at 10:08

## 2024-07-18 RX ADMIN — DIAZEPAM 5 MILLIGRAM(S): 10 TABLET ORAL at 10:59

## 2024-07-18 RX ADMIN — ATORVASTATIN CALCIUM 20 MILLIGRAM(S): 20 TABLET, FILM COATED ORAL at 21:07

## 2024-07-18 RX ADMIN — ASPIRIN 324 MILLIGRAM(S): 325 TABLET, FILM COATED ORAL at 11:39

## 2024-07-18 NOTE — CARE COORDINATION ASSESSMENT. - PRO ARRIVE FROM
DX:68-year-old female who presents to the emergency room with a chief complaint of feeling unsteady.  Past medical history of HTN anxiety bipolar disorder pre-diabetes hypercholesterol irritable bowel syndrome depression mitral valve prolapse obesity obstructive sleep apnea history of sustained supraventricular tachycardia.      Pending MRI Results.       CM met with patient at bedside. Patient is alert times 3. Patient sister Rika at bedside [phone]. Patient was evlauated by PT recommending Home PT vs [facility] and recommending a walker. Patient feels that she doesn't need services now. Patient lives alone  has 4 steps to enter in the house and 13 steps to get to the bedroom. CM reviewed  Stroke [place] Rounding with patient.    CM Verified:     PCP: DR. June Del Valle [facility] [phone].  Pharmacy: [facility] [phone].  Insurance: [employer] /Medicare Advantage.  : Patient stated No.       CM explained about homecare servcies with a verbal understanding. CM will setup referral and continue to follow case./home

## 2024-07-18 NOTE — H&P ADULT - PROBLEM SELECTOR PLAN 1
R/O CVA  Admit  ASA 81mg qd  Check TTE, MRI brain  Neuro checks q4h  PT/OT  Neuro consult  Further work-up/management pending clinical course.

## 2024-07-18 NOTE — CONSULT NOTE ADULT - SUBJECTIVE AND OBJECTIVE BOX
Patient is a 68y old  Female who presents with a chief complaint of dizziness (18 Jul 2024 11:28)      HPI:  This is a 68-year-old female who presents to the emergency room with a chief complaint of feeling unsteady.  Past medical history of HTN anxiety bipolar disorder pre-diabetes hypercholesterol irritable bowel syndrome depression mitral valve prolapse obesity obstructive sleep apnea history of sustained supraventricular tachycardia.  Patient also endorses a prior history of vertigo.  Reports that she went to bed at around 11 PM in her normal state of health and she awoke at around 3 AM feeling off balance dizzy she went back to bed woke up at around 7 had slight improvement in symptoms but had not returned back to her baseline and then around 830 while getting ready to come into volunteer her symptoms acutely worsened and she felt like the wall was moving when she looked at it.  Denies any headache vomiting chest pain shortness of breath or abdominal pain.  Denies any extremity numbness or weakness.  Is not currently nauseous but has had nausea recently as she is transitioning off of Ozempic.  States that this does not feel like her prior vertiginous episode.  Has not taken anything for the symptoms. (18 Jul 2024 11:28)      PAST MEDICAL & SURGICAL HISTORY:  Mitral Valve Prolapse      Sustained Supraventricular Tachycardia  on meds      Mild Depression      Anxiety      Hypercholesteremia      IBS (Irritable Bowel Syndrome)      PRESTON (Obstructive Sleep Apnea)  cpap at night      Diabetes mellitus, type 2      H/O insomnia      Obesity, morbid, BMI 40.0-49.9      Bipolar 1 disorder      S/P Dilation and Curettage      Benign Tumor of Breast  right      Fallen Bladder  Bladder Sling      History of Arthroscopy of Knee  Right      Hernia  Right Inguinal  repair      H/O tubal ligation          MEDICATIONS  (STANDING):  amLODIPine   Tablet 5 milliGRAM(s) Oral daily  atenolol  Tablet 37.5 milliGRAM(s) Oral daily  atorvastatin 20 milliGRAM(s) Oral at bedtime  gabapentin 300 milliGRAM(s) Oral at bedtime  lamoTRIgine 200 milliGRAM(s) Oral two times a day  levothyroxine 50 MICROGram(s) Oral <User Schedule>  levothyroxine 25 MICROGram(s) Oral <User Schedule>  melatonin 5 milliGRAM(s) Oral at bedtime  mirtazapine 45 milliGRAM(s) Oral at bedtime  multivitamin 1 Tablet(s) Oral daily    MEDICATIONS  (PRN):  acetaminophen     Tablet .. 650 milliGRAM(s) Oral every 6 hours PRN Temp greater or equal to 38C (100.4F), Mild Pain (1 - 3)  aluminum hydroxide/magnesium hydroxide/simethicone Suspension 30 milliLiter(s) Oral every 4 hours PRN Dyspepsia  ondansetron Injectable 4 milliGRAM(s) IV Push every 6 hours PRN Nausea and/or Vomiting      Allergies    sulfa drugs (Hives; Urticaria; Rash)  Beef (Unknown)    Intolerances        SOCIAL HISTORY:    No h/o Smoking.   No h/o alcohol use.  Ex Smoker. Quite in past.  Pt smokes.  Pt does drink socially.  Pt drinks alcohol heavily.    FAMILY HISTORY:      REVIEW OF SYSTEMS:    CONSTITUTIONAL: No fever  EYES: No eye pain,   ENMT:  No sinus or throat pain  NECK: No pain or stiffness  RESPIRATORY: No cough, No hemoptysis; No shortness of breath  CARDIOVASCULAR: No acute chest pain, palpitations,  or leg swelling  GASTROINTESTINAL: No abdominal pain. No nausea, vomiting, or hematemesis;  No melena or hematochezia.  GENITOURINARY: No  hematuria, or incontinence  MUSCULOSKELETAL: No joint swelling; No extremity pain  SKIN: No itching, rashes, or lesions   LYMPH NODES: No enlarged glands  NEUROLOGICAL: No headaches, memory loss,   PSYCHIATRIC: No depression, anxiety, mood swings, or difficulty sleeping  ENDOCRINE: No heat or cold intolerance;   HEME/LYMPH: No easy bruising, or bleeding gums  Allergy/Immunology. No medication allergy. No seasonal allergies.    PHYSICAL EXAM:  Vital Signs Last 24 Hrs  T(F): 98.3 (07-18-24 @ 09:17)  HR: 71 (07-18-24 @ 09:17)  BP: 161/73 (07-18-24 @ 09:17)  RR: 20 (07-18-24 @ 09:17)    GENERAL: NAD, well-groomed, well-developed  HEAD:  Atraumatic, Normocephalic  EYES: EOMI, PERRLA, conjunctiva and sclera clear  NECK: Supple, No JVD, thyroid non-palpable    On Neurological Examination:    Mental Status - Pt is alert, awake, oriented X3. Higher functions are intact. Pt. does have mild poor cognition. Follows commands well and able to answer questions appropriately.    Speech -  Normal. Slurred. Pt has no aphasia.    Cranial Nerves - Pupils 3 mm equal and reactive to light, extraocular eye movements intact. Pt has no visual field deficit.  Pt has no right left facial asymmetry. Tongue - is in midline.    Motor Exam - 4 plus/5 all over, No drift. No shaking or tremors.  Muscle tone - is normal all over. Moves all extremities equally. No asymmetry is seen.      Sensory Exam - Pin prick, temperature, joint position and vibration are intact on either side. Pt withdraws all extremities equally on stimulation. No asymmetry seen. No complaints of tingling, numbness.    Gait - Able to stand and walk unassisted. Pt is able to stand up with holding my hands and is able to walk for few feet around the bed. Not falling to either side.    Deep tendon Reflexes - 2 plus all over.    Coordination - Fine finger movements are normal on both sides. Finger to nose is also normal on both sides.       Romberg - Negative.    Neck Supple -  Yes.    LABS:                        13.9   6.57  )-----------( 221      ( 18 Jul 2024 09:20 )             41.6     07-18    141  |  105  |  14  ----------------------------<  174<H>  3.8   |  28  |  0.83    Ca    10.3      18 Jul 2024 09:20  Mg     2.2     07-18    TPro  7.5  /  Alb  3.8  /  TBili  0.4  /  DBili  x   /  AST  22  /  ALT  36  /  AlkPhos  166<H>  07-18    PT/INR - ( 18 Jul 2024 09:20 )   PT: 11.1 sec;   INR: 0.99 ratio         PTT - ( 18 Jul 2024 09:20 )  PTT:29.5 sec  Urinalysis Basic - ( 18 Jul 2024 09:20 )    Color: x / Appearance: x / SG: x / pH: x  Gluc: 174 mg/dL / Ketone: x  / Bili: x / Urobili: x   Blood: x / Protein: x / Nitrite: x   Leuk Esterase: x / RBC: x / WBC x   Sq Epi: x / Non Sq Epi: x / Bacteria: x        RADIOLOGY & ADDITIONAL STUDIES:       Patient is a 68y old  Female who presents with a chief complaint of dizziness (18 Jul 2024 11:28)    HPI: This is a 68-year-old female who presents to the emergency room with a chief complaint of feeling unsteady.  Past medical history of HTN anxiety bipolar disorder pre-diabetes hypercholesterol irritable bowel syndrome depression mitral valve prolapse obesity obstructive sleep apnea history of sustained supraventricular tachycardia.  Patient also endorses a prior history of vertigo.  Reports that she went to bed at around 11 PM in her normal state of health and she awoke at around 3 AM feeling off balance dizzy she went back to bed woke up at around 7 had slight improvement in symptoms but had not returned back to her baseline and then around 830 while getting ready to come into volunteer her symptoms acutely worsened and she felt like the wall was moving when she looked at it.  Denies any headache vomiting chest pain shortness of breath or abdominal pain.  Denies any extremity numbness or weakness.  Is not currently nauseous but has had nausea recently as she is transitioning off of Ozempic.  States that this does not feel like her prior vertiginous episode.  Has not taken anything for the symptoms. (18 Jul 2024 11:28)    NIHSS  -0  CT head  -No acute pathology  CTAs  -Neg  Not a TNK candidate  -NIHSS - 0, laura woke up with symptoms, last wel known time > 4.5 hrs      PAST MEDICAL & SURGICAL HISTORY:    Mitral Valve Prolapse    Sustained Supraventricular Tachycardia  on meds    Mild Depression    Anxiety    Hypercholesteremia    IBS (Irritable Bowel Syndrome)    PRESTON (Obstructive Sleep Apnea)  cpap at night    Diabetes mellitus, type 2    H/O insomnia    Obesity, morbid, BMI 40.0-49.9    Bipolar 1 disorder    S/P Dilation and Curettage      Benign Tumor of Breast  right    Fallen Bladder  Bladder Sling    History of Arthroscopy of Knee  Right    Hernia  Right Inguinal  repair    H/O tubal ligation    MEDICATIONS  (STANDING):    amLODIPine   Tablet 5 milliGRAM(s) Oral daily  atenolol  Tablet 37.5 milliGRAM(s) Oral daily  atorvastatin 20 milliGRAM(s) Oral at bedtime  gabapentin 300 milliGRAM(s) Oral at bedtime  lamoTRIgine 200 milliGRAM(s) Oral two times a day  levothyroxine 50 MICROGram(s) Oral <User Schedule>  levothyroxine 25 MICROGram(s) Oral <User Schedule>  melatonin 5 milliGRAM(s) Oral at bedtime  mirtazapine 45 milliGRAM(s) Oral at bedtime  multivitamin 1 Tablet(s) Oral daily    MEDICATIONS  (PRN):    acetaminophen     Tablet .. 650 milliGRAM(s) Oral every 6 hours PRN Temp greater or equal to 38C (100.4F), Mild Pain (1 - 3)  aluminum hydroxide/magnesium hydroxide/simethicone Suspension 30 milliLiter(s) Oral every 4 hours PRN Dyspepsia  ondansetron Injectable 4 milliGRAM(s) IV Push every 6 hours PRN Nausea and/or Vomiting    Allergies    sulfa drugs (Hives; Urticaria; Rash)  Beef (Unknown)    SOCIAL HISTORY:    No h/o Smoking.   No h/o alcohol use.    FAMILY HISTORY: Asked the pt. N/C    REVIEW OF SYSTEMS:    CONSTITUTIONAL: No fever  EYES: No eye pain,   ENMT:  No sinus or throat pain  NECK: No pain or stiffness  RESPIRATORY: No cough, No hemoptysis; No shortness of breath  CARDIOVASCULAR: No acute chest pain, palpitations,  or leg swelling  GASTROINTESTINAL: No abdominal pain. No nausea, vomiting, or hematemesis;  No melena or hematochezia.  GENITOURINARY: No  hematuria, or incontinence  MUSCULOSKELETAL: No joint swelling; No extremity pain  SKIN: No itching, rashes, or lesions   LYMPH NODES: No enlarged glands  NEUROLOGICAL: No headaches, memory loss,   PSYCHIATRIC: h/O depression, anxiety, Bipolar  ENDOCRINE: No heat or cold intolerance;   HEME/LYMPH: No easy bruising, or bleeding gums  Allergy/Immunology. Reviewed    PHYSICAL EXAM:  Vital Signs Last 24 Hrs  T(F): 98.3 (07-18-24 @ 09:17)  HR: 71 (07-18-24 @ 09:17)  BP: 161/73 (07-18-24 @ 09:17)  RR: 20 (07-18-24 @ 09:17)    GENERAL: NAD, well-groomed, well-developed  HEAD:  Atraumatic, Normocephalic  EYES: EOMI, PERRLA, conjunctiva and sclera clear  NECK: Supple, No JVD    On Neurological Examination:    Mental Status - Pt is alert, awake, oriented X3. Higher functions are intact. Follows commands well and able to answer questions appropriately.    Speech -  Normal. Pt has no aphasia.    Cranial Nerves - Pupils 3 mm equal and reactive to light, extraocular eye movements intact. Pt has no visual field deficit.  No facial asymmetry. Tongue - is in midline.    Motor Exam - 4 plus/5 all over, No drift. No shaking or tremors.    Sensory Exam - Pt withdraws all extremities equally on stimulation.     Gait - Says she feels severely vertiginous and can't stand     Deep tendon Reflexes - 2 plus all over.    Coordination - Fine finger movements are normal on both sides. Finger to nose is also normal on both sides.       Neck Supple -  Yes.    LABS:                        13.9   6.57  )-----------( 221      ( 18 Jul 2024 09:20 )             41.6     07-18    141  |  105  |  14  ----------------------------<  174<H>  3.8   |  28  |  0.83    Ca    10.3      18 Jul 2024 09:20  Mg     2.2     07-18    TPro  7.5  /  Alb  3.8  /  TBili  0.4  /  DBili  x   /  AST  22  /  ALT  36  /  AlkPhos  166<H>  07-18    PT/INR - ( 18 Jul 2024 09:20 )   PT: 11.1 sec;   INR: 0.99 ratio      PTT - ( 18 Jul 2024 09:20 )  PTT:29.5 sec  Urinalysis Basic - ( 18 Jul 2024 09:20 )    Color: x / Appearance: x / SG: x / pH: x  Gluc: 174 mg/dL / Ketone: x  / Bili: x / Urobili: x   Blood: x / Protein: x / Nitrite: x   Leuk Esterase: x / RBC: x / WBC x   Sq Epi: x / Non Sq Epi: x / Bacteria: x    RADIOLOGY & ADDITIONAL STUDIES:    rad< from: CT Brain Stroke Protocol (07.18.24 @ 09:22) >  CTA head and Neck with contrast     HEAD CT: No acute intracranial hemorrhage or acute territorial infarction.    CTA COW:  Intra cranial circulation without flow limiting stenosis or large vessel occlusion.    CTA NECK: Patent, ECAs, ICAs, no  hemodynamically significant stenosis at ICA origins by NASCET criteria.  Bilateral vertebral arteries are patent without flow limiting stenosis.    < end of copied text >

## 2024-07-18 NOTE — H&P ADULT - HISTORY OF PRESENT ILLNESS
This is a 68-year-old female who presents to the emergency room with a chief complaint of feeling unsteady.  Past medical history of HTN anxiety bipolar disorder pre-diabetes hypercholesterol irritable bowel syndrome depression mitral valve prolapse obesity obstructive sleep apnea history of sustained supraventricular tachycardia.  Patient also endorses a prior history of vertigo.  Reports that she went to bed at around 11 PM in her normal state of health and she awoke at around 3 AM feeling off balance dizzy she went back to bed woke up at around 7 had slight improvement in symptoms but had not returned back to her baseline and then around 830 while getting ready to come into volunteer her symptoms acutely worsened and she felt like the wall was moving when she looked at it.  Denies any headache vomiting chest pain shortness of breath or abdominal pain.  Denies any extremity numbness or weakness.  Is not currently nauseous but has had nausea recently as she is transitioning off of Ozempic.  States that this does not feel like her prior vertiginous episode.  Has not taken anything for the symptoms.

## 2024-07-18 NOTE — ED ADULT TRIAGE NOTE - CHIEF COMPLAINT QUOTE
" I woke up around 330 am feeling dizzy - went to bed woke up this morning feeling slightly better. At around 830 - I have a vision changed  - felt that the wall is moving " P last know well 11 pm last night  No slurred speech No arm drift No facial drooping noted

## 2024-07-18 NOTE — PHYSICAL THERAPY INITIAL EVALUATION ADULT - LEVEL OF CONSCIOUSNESS, REHAB EVAL
Subjective:       Patient ID: Trina Marie Collette is a 56 y.o. female.    Chief Complaint: Follow-up (With scope)    Follow-up visit:  The patient is coming in for a follow-up visit.  There are multiple issues to discuss:  1.  Laryngopharyngeal reflux:   She is taking famotidine twice daily.   She is coming in today for a flexible nasolaryngoscopy evaluation.  2.  Allergic rhinitis:  She continues to have nasal congestion, rhinorrhea and postnasal drip.  Nasal secretions are clear.  She takes Singulair at night and use ipratropium bromide as needed for postnasal drip.    3.  Pulsatile tinnitus right ear:  She continues to have pulsatile tinnitus in her right ear.  4.  Asthma/Chronic cough:  She continues to have coughing both day and night.  The Phenergan with codeine is the only thing that stops her nighttime cough.      Review of Systems   Constitutional: Positive for fatigue. Negative for activity change, appetite change, chills, fever and unexpected weight change.   HENT: Positive for congestion, ear pain, postnasal drip, rhinorrhea, sore throat, tinnitus and trouble swallowing. Negative for ear discharge, facial swelling, hearing loss, mouth sores, sinus pressure, sinus pain, sneezing and voice change.    Eyes: Negative for photophobia, pain, discharge, redness, itching and visual disturbance.   Respiratory: Positive for cough, shortness of breath and wheezing. Negative for apnea and choking.    Cardiovascular: Negative for chest pain and palpitations.   Gastrointestinal: Negative for abdominal distention, abdominal pain, nausea and vomiting.   Musculoskeletal: Negative for arthralgias, myalgias, neck pain and neck stiffness.   Skin: Negative.  Negative for color change, pallor and rash.   Allergic/Immunologic: Positive for environmental allergies. Negative for food allergies and immunocompromised state.   Neurological: Positive for headaches. Negative for dizziness, facial asymmetry, speech difficulty,  weakness, light-headedness and numbness.   Hematological: Negative for adenopathy. Does not bruise/bleed easily.   Psychiatric/Behavioral: Negative for confusion, decreased concentration and sleep disturbance.       Objective:      Physical Exam  Constitutional:       Appearance: She is well-developed.   HENT:      Head: Normocephalic.      Right Ear: Ear canal and external ear normal. Tympanic membrane is retracted.      Left Ear: Ear canal and external ear normal. Tympanic membrane is retracted.      Nose: Septal deviation (To the left), nasal tenderness (Scabbing and erythema in the piriform aperture area of the right nostril vestibular skin), mucosal edema (cyanotic, boggy inferior turbinates bilaterally) and rhinorrhea (clear mucus bilaterally) present. No nasal deformity. Rhinorrhea is clear.      Mouth/Throat:      Mouth: No oral lesions.      Pharynx: Uvula midline.   Eyes:      General: Lids are normal.         Right eye: No discharge.         Left eye: No discharge.      Conjunctiva/sclera:      Right eye: Right conjunctiva is injected. No exudate.     Left eye: Left conjunctiva is injected. No exudate.     Pupils: Pupils are equal, round, and reactive to light.   Neck:      Thyroid: No thyroid mass or thyromegaly.      Vascular: No carotid bruit.      Trachea: Trachea normal. No tracheal deviation.        Comments: Neck-compression of the vascular sheath at the angle the mandible on the right as noted above results and complete cessation of her pulsatile tinnitus.  Cardiovascular:      Rate and Rhythm: Normal rate and regular rhythm.      Pulses: Normal pulses.      Heart sounds: Normal heart sounds.   Pulmonary:      Effort: Pulmonary effort is normal.      Breath sounds: No stridor. Examination of the right-lower field reveals wheezing. Examination of the left-lower field reveals wheezing. Wheezing ( mild expiratory wheezing in all lung fields) present. No decreased breath sounds, rhonchi or rales.    Abdominal:      General: Bowel sounds are normal.      Palpations: Abdomen is soft.      Tenderness: There is no abdominal tenderness.   Musculoskeletal:         General: Normal range of motion.      Cervical back: Normal range of motion. No muscular tenderness.   Lymphadenopathy:      Head:      Right side of head: No submental, submandibular, preauricular, posterior auricular or occipital adenopathy.      Left side of head: No submental, submandibular, preauricular, posterior auricular or occipital adenopathy.      Cervical: No cervical adenopathy.   Skin:     General: Skin is warm and dry.      Findings: No petechiae or rash.      Nails: There is no clubbing.   Neurological:      Mental Status: She is alert and oriented to person, place, and time.      Cranial Nerves: No cranial nerve deficit.      Sensory: No sensory deficit.      Gait: Gait normal.   Psychiatric:         Speech: Speech normal.         Behavior: Behavior normal. Behavior is cooperative.         Thought Content: Thought content normal.         Judgment: Judgment normal.         Flexible video nasolaryngoscopy:  Nasal septal deviation and nasal changes allergic rhinitis; laryngeal changes consistent with laryngopharyngeal reflux that is responding well to H2 agonist therapy    Assessment:       1. Pulsatile tinnitus of right ear    2. Pseudotumor cerebri syndrome    3. Allergic rhinitis, unspecified seasonality, unspecified trigger    4. Laryngopharyngeal reflux (LPR)    5. Dysphagia, unspecified type    6. Mild intermittent asthma with acute exacerbation    7. Nasal septal deviation    8. PND (post-nasal drip)        Plan:       I will have the patient continue with her current drug regimen.  I will recheck 2 months, or sooner on an as-needed basis.     alert

## 2024-07-18 NOTE — CONSULT NOTE ADULT - ASSESSMENT
Pt is seen for Dizziness.  C/o Vertigo.    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  Admit to monitored bed  Posterior circulation CVA would need to be ruled out.  Recommend Meclizine 25 mg PO TID.  MRI Brain - No gado  2D Echo  Ecotrin 81  Lipitor 40  Lipid panel/HbA1c  PT  Fall/safety precautions.   D/w Pt at bedside. Questions answered.  D/w Dr. Perdomo and Dr. Collins.   Would continue to follow.

## 2024-07-18 NOTE — PHYSICAL THERAPY INITIAL EVALUATION ADULT - NAME OF DISCHARGE PLANNER
It was a pleasure to see you today.  You presented to the clinic for a follow-up.     Plan:  - for your constipation, make sure to continue to taking the miralax daily as well as continuing to eat high fiber foods. Try not to strain when having a bowel movement as well. If you start to see any bloody or black stools, please notify our clinic.     Follow up appointments:  Follow up in     Further Instructions:  Please schedule a follow-up with us today before you leave.     For any labs completed today, the results can be accessed through the portal, please sign up! We may also mail or call you with the results.    If you are unable to get labs completed today, the lab is open Monday through Friday from 8 am to 4 pm.  Call to schedule a nursing visit.  Please fast at least 8 hours prior to your lab draw if having cholesterol checked.    If you require an imaging study and need to schedule an appointment, call central scheduling at [phone] to schedule all appointments. Please ask for the location address at the time of the phone call.    If your referral requires authorization, our staff will complete this on your behalf and will mail you the referral, it may take a few days.     Dr. Lawson Bright     via [place]

## 2024-07-18 NOTE — ED PROVIDER NOTE - DIFFERENTIAL DIAGNOSIS
Who Is Your Referring Provider?: Dr. Coto Patient presenting to the emergency room for sensation of feeling off balance.  Differential includes but limited to possible benign positional vertigo versus posterior cerebellar infarct.  Patient is outside the window for tenecteplase.  Will obtain screening labs CT neurostroke imaging.  Will medicate for symptoms and consult with neurology.  Ultimate clinical disposition will be pending results. Differential Diagnosis

## 2024-07-18 NOTE — CARE COORDINATION ASSESSMENT. - NSDCPLANSERVICES_GEN_ALL_CORE
DX:68-year-old female who presents to the emergency room with a chief complaint of feeling unsteady.  Past medical history of HTN anxiety bipolar disorder pre-diabetes hypercholesterol irritable bowel syndrome depression mitral valve prolapse obesity obstructive sleep apnea history of sustained supraventricular tachycardia.      Pending MRI Results.       CM met with patient at bedside. Patient is alert times 3. Patient sister Rika at bedside [phone]. Patient was evlauated by PT recommending Home PT vs [facility] and recommending a walker. Patient feels that she doesn't need services now. Patient lives alone  has 4 steps to enter in the house and 13 steps to get to the bedroom. CM reviewed  Stroke [place] Rounding with patient.    CM Verified:     PCP: DR. June Del Val[facility] [phone].  Pharmacy: [facility] [phone].  Insurance: [employer] /Medicare Advantage.  : Patient stated No.       CM explained about homecare servcies with a verbal understanding. CM will setup referral and continue to follow case./Home Care

## 2024-07-18 NOTE — ED PROVIDER NOTE - OBJECTIVE STATEMENT
Patient is a 68-year-old female who presents to the emergency room with a chief complaint of feeling unsteady.  Past medical history of anxiety bipolar disorder diabetes hypercholesterol irritable bowel syndrome depression mitral valve prolapse obesity obstructive sleep apnea history of sustained supraventricular tachycardia.  Patient also endorses a prior history of vertigo.  Reports that she went to bed at around 11 PM in her normal state of health and she awoke at around 3 AM feeling off balance dizzy she went back to bed woke up at around 7 had slight improvement in symptoms but had not returned back to her baseline and then around 830 while getting ready to come into volunteer her symptoms acutely worsened and she felt like the wall was moving when she looked at it.  Denies any headache vomiting chest pain shortness of breath or abdominal pain.  Denies any extremity numbness or weakness.  Is not currently nauseous but has had nausea recently as she is transitioning off of Ozempic.  States that this does not feel like her prior vertiginous episode.  Has not taken anything for the symptoms.

## 2024-07-18 NOTE — ED ADULT NURSE NOTE - OBJECTIVE STATEMENT
Receive pt in assigned area a&xo3, stroke activation, states at 11p she went to bed & woke up at 3am feeling like she was off balance & up/down movements, pt then went back to sleep at woke at 830am slightly better but noticed her visual field was " up/down" & felt as if she was swaying, denies any blurry vision, h/a, n/v, or weakness/numbness to extremities. No slurred speech or facial droop is noted, able to move all extremities, no deficits are noted. Skin intact, resps even and non labored, CT completed. IVL placed labs drawn and sent, pt placed on cardiac monitor, ekg completed, will continue to monitor pt

## 2024-07-18 NOTE — CARE COORDINATION ASSESSMENT. - NSCAREPROVIDERS_GEN_ALL_CORE_FT
CARE PROVIDERS:  Accepting Physician: Angel Collins  Access Services: Gene Gardner  Administration: Aki Smith  Administration: Harriet Galeana  Admitting: Angel Collins  Attending: Angel Collins  Cardiology Technician: Priti Leon  Cardiology Technician: Shirley Felipe  Consultant: Willian Ansari  Covering Team: Angel Collins  ED Attending: Leann James  ED Nurse: Leia Calderon  Nurse: Valentino, Samantha  Nurse: Fitz Lane  Nurse: Toshia Kinney  Nurse: Carol Hurst  Nurse: Erin London  Nurse: Mary Cervantes  Nurse: Ben Thompson  Nurse: Katie Ojeda  Occupational Therapy: April Claros  Occupational Therapy: Nidia Rodriguez  Override: Valentino, Samantha  Override: Fitz Lane  Physical Therapy: Leia Luna  Physical Therapy: Ruba Warner  Primary Team: Leann James  Registered Dietitian: Judy Moran  Respiratory Therapy: Francesca Waterman  : Dilia Albright  : Liset Ribera// Supp. Assoc.: Michelle Vincent

## 2024-07-18 NOTE — CARE COORDINATION ASSESSMENT. - NSPASTMEDSURGHISTORY_GEN_ALL_CORE_FT
PAST MEDICAL & SURGICAL HISTORY:  PRESTON (Obstructive Sleep Apnea)  cpap at night      IBS (Irritable Bowel Syndrome)      Hypercholesteremia      Anxiety      Mild Depression      Sustained Supraventricular Tachycardia  on meds      Mitral Valve Prolapse      Hernia  Right Inguinal  repair      History of Arthroscopy of Knee  Right      Fallen Bladder  Bladder Sling      Benign Tumor of Breast  right      S/P Dilation and Curettage      Bipolar 1 disorder      Obesity, morbid, BMI 40.0-49.9      H/O insomnia      Diabetes mellitus, type 2      H/O tubal ligation

## 2024-07-18 NOTE — ED PROVIDER NOTE - CLINICAL SUMMARY MEDICAL DECISION MAKING FREE TEXT BOX
Patient is a 68-year-old female who presents to the emergency room with a chief complaint of feeling unsteady.  Past medical history of anxiety bipolar disorder diabetes hypercholesterol irritable bowel syndrome depression mitral valve prolapse obesity obstructive sleep apnea history of sustained supraventricular tachycardia.  Patient also endorses a prior history of vertigo.  Reports that she went to bed at around 11 PM in her normal state of health and she awoke at around 3 AM feeling off balance dizzy she went back to bed woke up at around 7 had slight improvement in symptoms but had not returned back to her baseline and then around 830 while getting ready to come into volunteer her symptoms acutely worsened and she felt like the wall was moving when she looked at it.  Denies any headache vomiting chest pain shortness of breath or abdominal pain.  Denies any extremity numbness or weakness.  Is not currently nauseous but has had nausea recently as she is transitioning off of Ozempic.  States that this does not feel like her prior vertiginous episode.  Has not taken anything for the symptoms. Patient presenting to the emergency room for sensation of feeling off balance.  Differential includes but limited to possible benign positional vertigo versus posterior cerebellar infarct.  Patient is outside the window for tenecteplase.  Will obtain screening labs CT neurostroke imaging.  Will medicate for symptoms and consult with neurology.  Ultimate clinical disposition will be pending results. Patient is a 68-year-old female who presents to the emergency room with a chief complaint of feeling unsteady.  Past medical history of anxiety bipolar disorder diabetes hypercholesterol irritable bowel syndrome depression mitral valve prolapse obesity obstructive sleep apnea history of sustained supraventricular tachycardia.  Patient also endorses a prior history of vertigo.  Reports that she went to bed at around 11 PM in her normal state of health and she awoke at around 3 AM feeling off balance dizzy she went back to bed woke up at around 7 had slight improvement in symptoms but had not returned back to her baseline and then around 830 while getting ready to come into volunteer her symptoms acutely worsened and she felt like the wall was moving when she looked at it.  Denies any headache vomiting chest pain shortness of breath or abdominal pain.  Denies any extremity numbness or weakness.  Is not currently nauseous but has had nausea recently as she is transitioning off of Ozempic.  States that this does not feel like her prior vertiginous episode.  Has not taken anything for the symptoms. Patient presenting to the emergency room for sensation of feeling off balance.  Differential includes but limited to possible benign positional vertigo versus posterior cerebellar infarct.  Patient is outside the window for tenecteplase.  Will obtain screening labs CT neurostroke imaging.  Will medicate for symptoms and consult with neurology.  Ultimate clinical disposition will be pending results. Independent review of EKG reveals a normal sinus rhythm at 68 bpm Patient is a 68-year-old female who presents to the emergency room with a chief complaint of feeling unsteady.  Past medical history of anxiety bipolar disorder diabetes hypercholesterol irritable bowel syndrome depression mitral valve prolapse obesity obstructive sleep apnea history of sustained supraventricular tachycardia.  Patient also endorses a prior history of vertigo.  Reports that she went to bed at around 11 PM in her normal state of health and she awoke at around 3 AM feeling off balance dizzy she went back to bed woke up at around 7 had slight improvement in symptoms but had not returned back to her baseline and then around 830 while getting ready to come into volunteer her symptoms acutely worsened and she felt like the wall was moving when she looked at it.  Denies any headache vomiting chest pain shortness of breath or abdominal pain.  Denies any extremity numbness or weakness.  Is not currently nauseous but has had nausea recently as she is transitioning off of Ozempic.  States that this does not feel like her prior vertiginous episode.  Has not taken anything for the symptoms. Patient presenting to the emergency room for sensation of feeling off balance.  Differential includes but limited to possible benign positional vertigo versus posterior cerebellar infarct.  Patient is outside the window for tenecteplase.  Will obtain screening labs CT neurostroke imaging.  Will medicate for symptoms and consult with neurology.  Ultimate clinical disposition will be pending results. Independent review of EKG reveals a normal sinus rhythm at 68 bpm. Results of labs reviewed patient with a normal white count no evidence of anemia coags noted CMP noted CT imaging with no acute intercranial hemorrhage no significant stenosis thyroid nodule noted we followed up admitting team.  Patient is still symptomatic will admit at this time for further workup and evaluation.

## 2024-07-18 NOTE — PHYSICAL THERAPY INITIAL EVALUATION ADULT - PERTINENT HX OF CURRENT PROBLEM, REHAB EVAL
as per chart - This is a 68-year-old female who presents to the emergency room with a chief complaint of feeling unsteady.  Past medical history of HTN anxiety bipolar disorder pre-diabetes hypercholesterol irritable bowel syndrome depression mitral valve prolapse obesity obstructive sleep apnea history of sustained supraventricular tachycardia.  Patient also endorses a prior history of vertigo.  Reports that she went to bed at around 11 PM in her normal state of health and she awoke at around 3 AM feeling off balance dizzy she went back to bed woke up at around 7 had slight improvement in symptoms but had not returned back to her baseline and then around 830 while getting ready to come into volunteer her symptoms acutely worsened and she felt like the wall was moving when she looked at it.  Denies any headache vomiting chest pain shortness of breath or abdominal pain.  Denies any extremity numbness or weakness.  Is not currently nauseous but has had nausea recently as she is transitioning off of Ozempic.  States that this does not feel like her prior vertiginous episode.  Has not taken anything for the symptoms.

## 2024-07-18 NOTE — PHYSICAL THERAPY INITIAL EVALUATION ADULT - ADDITIONAL COMMENTS
Pt resides in pvt home duplex alone, states does not have anyone to assist. Pt states 4STE +HR, full flight +HR to second floor. Pt needs RW. Pt reports was independent prior to admission without AD. Pt reports hx of vertigo however states symptoms do not feel like previous vertigo symptoms.

## 2024-07-18 NOTE — PATIENT CHOICE NOTE. - NSPTCHOICENOTES_GEN_ALL_CORE
[facility] care agency [phone] will reach out to you within 24-72 hours of your discharge to schedule home care visit/eval appointment with you. Please call agency for any queries regarding home care services

## 2024-07-18 NOTE — CARE COORDINATION ASSESSMENT. - OTHER PERTINENT DISCHARGE PLANNING INFORMATION:
68-year-old female who presents to the emergency room with a chief complaint of feeling unsteady. Met patient at bedside.  Explained role of CM, verbalized understanding. Pt was made aware a CM will remain available through hospitalization.  Contact information given in discharge/ transitions resource folder.

## 2024-07-18 NOTE — H&P ADULT - NEGATIVE NEUROLOGICAL SYMPTOMS
no transient paralysis/no weakness/no paresthesias/no generalized seizures/no focal seizures/no syncope/no tremors/no loss of sensation/no headache/no hemiparesis/no confusion/no facial palsy

## 2024-07-19 ENCOUNTER — TRANSCRIPTION ENCOUNTER (OUTPATIENT)
Age: 69
End: 2024-07-19

## 2024-07-19 VITALS
OXYGEN SATURATION: 95 % | DIASTOLIC BLOOD PRESSURE: 62 MMHG | TEMPERATURE: 98 F | SYSTOLIC BLOOD PRESSURE: 112 MMHG | RESPIRATION RATE: 18 BRPM | HEART RATE: 76 BPM

## 2024-07-19 LAB
A1C WITH ESTIMATED AVERAGE GLUCOSE RESULT: 5.7 % — HIGH (ref 4–5.6)
ANION GAP SERPL CALC-SCNC: 9 MMOL/L — SIGNIFICANT CHANGE UP (ref 5–17)
BUN SERPL-MCNC: 21 MG/DL — SIGNIFICANT CHANGE UP (ref 7–23)
CALCIUM SERPL-MCNC: 10.6 MG/DL — HIGH (ref 8.4–10.5)
CHLORIDE SERPL-SCNC: 105 MMOL/L — SIGNIFICANT CHANGE UP (ref 96–108)
CHOLEST SERPL-MCNC: 175 MG/DL — SIGNIFICANT CHANGE UP
CO2 SERPL-SCNC: 26 MMOL/L — SIGNIFICANT CHANGE UP (ref 22–31)
CREAT SERPL-MCNC: 0.79 MG/DL — SIGNIFICANT CHANGE UP (ref 0.5–1.3)
EGFR: 82 ML/MIN/1.73M2 — SIGNIFICANT CHANGE UP
ESTIMATED AVERAGE GLUCOSE: 117 MG/DL — HIGH (ref 68–114)
GLUCOSE SERPL-MCNC: 155 MG/DL — HIGH (ref 70–99)
HCT VFR BLD CALC: 39.9 % — SIGNIFICANT CHANGE UP (ref 34.5–45)
HDLC SERPL-MCNC: 61 MG/DL — SIGNIFICANT CHANGE UP
HGB BLD-MCNC: 13.3 G/DL — SIGNIFICANT CHANGE UP (ref 11.5–15.5)
LIPID PNL WITH DIRECT LDL SERPL: 95 MG/DL — SIGNIFICANT CHANGE UP
MAGNESIUM SERPL-MCNC: 2.2 MG/DL — SIGNIFICANT CHANGE UP (ref 1.6–2.6)
MCHC RBC-ENTMCNC: 30 PG — SIGNIFICANT CHANGE UP (ref 27–34)
MCHC RBC-ENTMCNC: 33.3 GM/DL — SIGNIFICANT CHANGE UP (ref 32–36)
MCV RBC AUTO: 90.1 FL — SIGNIFICANT CHANGE UP (ref 80–100)
NON HDL CHOLESTEROL: 114 MG/DL — SIGNIFICANT CHANGE UP
NRBC # BLD: 0 /100 WBCS — SIGNIFICANT CHANGE UP (ref 0–0)
PLATELET # BLD AUTO: 241 K/UL — SIGNIFICANT CHANGE UP (ref 150–400)
POTASSIUM SERPL-MCNC: 4.1 MMOL/L — SIGNIFICANT CHANGE UP (ref 3.5–5.3)
POTASSIUM SERPL-SCNC: 4.1 MMOL/L — SIGNIFICANT CHANGE UP (ref 3.5–5.3)
RBC # BLD: 4.43 M/UL — SIGNIFICANT CHANGE UP (ref 3.8–5.2)
RBC # FLD: 12.1 % — SIGNIFICANT CHANGE UP (ref 10.3–14.5)
SODIUM SERPL-SCNC: 140 MMOL/L — SIGNIFICANT CHANGE UP (ref 135–145)
TRIGL SERPL-MCNC: 109 MG/DL — SIGNIFICANT CHANGE UP
WBC # BLD: 10.96 K/UL — HIGH (ref 3.8–10.5)
WBC # FLD AUTO: 10.96 K/UL — HIGH (ref 3.8–10.5)

## 2024-07-19 PROCEDURE — 36415 COLL VENOUS BLD VENIPUNCTURE: CPT

## 2024-07-19 PROCEDURE — 70498 CT ANGIOGRAPHY NECK: CPT | Mod: MC

## 2024-07-19 PROCEDURE — 85730 THROMBOPLASTIN TIME PARTIAL: CPT

## 2024-07-19 PROCEDURE — 83690 ASSAY OF LIPASE: CPT

## 2024-07-19 PROCEDURE — 70496 CT ANGIOGRAPHY HEAD: CPT | Mod: MC

## 2024-07-19 PROCEDURE — 80048 BASIC METABOLIC PNL TOTAL CA: CPT

## 2024-07-19 PROCEDURE — 84484 ASSAY OF TROPONIN QUANT: CPT

## 2024-07-19 PROCEDURE — 80175 DRUG SCREEN QUAN LAMOTRIGINE: CPT

## 2024-07-19 PROCEDURE — 97116 GAIT TRAINING THERAPY: CPT

## 2024-07-19 PROCEDURE — 97530 THERAPEUTIC ACTIVITIES: CPT

## 2024-07-19 PROCEDURE — 76376 3D RENDER W/INTRP POSTPROCES: CPT

## 2024-07-19 PROCEDURE — 97161 PT EVAL LOW COMPLEX 20 MIN: CPT

## 2024-07-19 PROCEDURE — 85610 PROTHROMBIN TIME: CPT

## 2024-07-19 PROCEDURE — 93005 ELECTROCARDIOGRAM TRACING: CPT

## 2024-07-19 PROCEDURE — 93356 MYOCRD STRAIN IMG SPCKL TRCK: CPT

## 2024-07-19 PROCEDURE — 80061 LIPID PANEL: CPT

## 2024-07-19 PROCEDURE — 80053 COMPREHEN METABOLIC PANEL: CPT

## 2024-07-19 PROCEDURE — 96374 THER/PROPH/DIAG INJ IV PUSH: CPT

## 2024-07-19 PROCEDURE — 85027 COMPLETE CBC AUTOMATED: CPT

## 2024-07-19 PROCEDURE — 93306 TTE W/DOPPLER COMPLETE: CPT

## 2024-07-19 PROCEDURE — 99285 EMERGENCY DEPT VISIT HI MDM: CPT

## 2024-07-19 PROCEDURE — 97535 SELF CARE MNGMENT TRAINING: CPT

## 2024-07-19 PROCEDURE — 83036 HEMOGLOBIN GLYCOSYLATED A1C: CPT

## 2024-07-19 PROCEDURE — 82962 GLUCOSE BLOOD TEST: CPT

## 2024-07-19 PROCEDURE — 83735 ASSAY OF MAGNESIUM: CPT

## 2024-07-19 PROCEDURE — 85025 COMPLETE CBC W/AUTO DIFF WBC: CPT

## 2024-07-19 PROCEDURE — 70450 CT HEAD/BRAIN W/O DYE: CPT | Mod: MC

## 2024-07-19 RX ORDER — MECLIZINE HCL 25 MG
1 TABLET ORAL
Qty: 30 | Refills: 0
Start: 2024-07-19

## 2024-07-19 RX ADMIN — Medication 25 MICROGRAM(S): at 05:49

## 2024-07-19 RX ADMIN — ENOXAPARIN SODIUM 40 MILLIGRAM(S): 100 INJECTION SUBCUTANEOUS at 05:50

## 2024-07-19 RX ADMIN — ATENOLOL 37.5 MILLIGRAM(S): 50 TABLET ORAL at 05:50

## 2024-07-19 RX ADMIN — Medication 1 TABLET(S): at 12:29

## 2024-07-19 RX ADMIN — AMLODIPINE BESYLATE 5 MILLIGRAM(S): 2.5 TABLET ORAL at 05:49

## 2024-07-19 RX ADMIN — LAMOTRIGINE 200 MILLIGRAM(S): 100 TABLET ORAL at 05:49

## 2024-07-19 RX ADMIN — ASPIRIN 81 MILLIGRAM(S): 325 TABLET, FILM COATED ORAL at 12:28

## 2024-07-19 NOTE — DISCHARGE NOTE PROVIDER - HOSPITAL COURSE
This is a 68-year-old female who presents to the emergency room with a chief complaint of feeling unsteady.  Past medical history of HTN anxiety bipolar disorder pre-diabetes hypercholesterol irritable bowel syndrome depression mitral valve prolapse obesity obstructive sleep apnea history of sustained supraventricular tachycardia.  Patient also endorses a prior history of vertigo.  Reports that she went to bed at around 11 PM in her normal state of health and she awoke at around 3 AM feeling off balance dizzy she went back to bed woke up at around 7 had slight improvement in symptoms but had not returned back to her baseline and then around 830 while getting ready to come into volunteer her symptoms acutely worsened and she felt like the wall was moving when she looked at it.  Denies any headache vomiting chest pain shortness of breath or abdominal pain.  Denies any extremity numbness or weakness.  Is not currently nauseous but has had nausea recently as she is transitioning off of Ozempic.  States that this does not feel like her prior vertiginous episode.  Has not taken anything for the symptoms.    CT/MRI neg for CVA  ECHO normal EF  Likely vertigo    >35 minutes spent on discharge

## 2024-07-19 NOTE — DISCHARGE NOTE NURSING/CASE MANAGEMENT/SOCIAL WORK - NSDCPEFALRISK_GEN_ALL_CORE
For information on Fall & Injury Prevention, visit: [email or web address] OR  [email or web address] OR  https://www.cdc.gov/steadi/patient.html

## 2024-07-19 NOTE — DISCHARGE NOTE NURSING/CASE MANAGEMENT/SOCIAL WORK - PATIENT PORTAL LINK FT
You can access the FollowMyHealth Patient Portal offered by [facility] by registering at the following website: [email or web address]. By joining [employer]’s FollowMyHealth portal, you will also be able to view your health information using other applications (apps) compatible with our system.

## 2024-07-19 NOTE — DISCHARGE NOTE PROVIDER - NSDCQMERRANDS_GEN_ALL_CORE
Patient ID Pili Falcon is a 50 year old female    Chief Complaint   Patient presents with   • Office Visit   • Pre-Op Exam       Visit Vitals  /82   Pulse 75   Temp 95 °F (35 °C)   Ht 5' 2\" (1.575 m)   Wt 109.3 kg (241 lb)   LMP 02/04/2023 (Exact Date)   SpO2 99%   BMI 44.08 kg/m²       Suffered rupture of right quadraceps tendon rupture after accidental fall downstairs and will have surg repair in near future.    Active med problems include htn for which she takes atenolol and dm w/o complications for which she takes only metformin with past a1cs of 6; poca1c today is 6.0    Know jesusita but not using cpap for several months    No surgical hx    No allergies    Non smoker    Works as  at [employer]    fhx non contrib    No hx of excessive bleeding      Eye Problem(s):negative  ENT Problem(s):negative  Cardiovascular problem(s):see hpi  Respiratory problem(s):negative  Gastro-intestinal problem(s):negative GI  Genito-urinary problem(s):negative  Musculoskeletal problem(s):see hpi  Integumentary problem(s):negative  Neurological problem(s):negative  Psychiatric problem(s):negative  Endocrine problem(s):see hpi  Hematologic and/or Lymphatic problem(s):negative      ecg today shows sinus with lvh by voltage      Physical Exam  Vitals and nursing note reviewed.   Constitutional:       Appearance: She is well-developed.   HENT:      Head: Normocephalic and atraumatic.      Neck: Normal range of motion and neck supple.   Eyes:      Conjunctiva/sclera: Conjunctivae normal.      Pupils: Pupils are equal, round, and reactive to light.   Neck:      Thyroid: No thyromegaly.   Cardiovascular:      Rate and Rhythm: Normal rate and regular rhythm.      Heart sounds: Normal heart sounds.   Pulmonary:      Effort: Pulmonary effort is normal.      Breath sounds: Normal breath sounds.   Abdominal:      General: Bowel sounds are normal.      Palpations: Abdomen is soft. There is no mass.       Tenderness: There is no abdominal tenderness.   Musculoskeletal:         General: No tenderness or deformity.      Comments: Right knee in immobilizer   Lymphadenopathy:      Cervical: No cervical adenopathy.   Skin:     General: Skin is warm and dry.      Findings: No erythema or rash.   Neurological:      Mental Status: She is alert and oriented to person, place, and time.      Cranial Nerves: No cranial nerve deficit.      Sensory: No sensory deficit.      Motor: No abnormal muscle tone.      Coordination: Coordination normal.   Psychiatric:         Behavior: Behavior normal.         Thought Content: Thought content normal.         Judgment: Judgment normal.         Assessment and Plan    Rupture of right quadriceps tendon  Medically stable for surg    Type 2 diabetes mellitus (CMS/HCC)  Well controlled; cont metformin    HTN (hypertension)  Acceptable control; cpm    Obstructive sleep apnea  Advise anesthesia of this condition      Orders Placed This Encounter   • CBC with Automated Differential   • Basic Metabolic Panel   • Electrocardiogram 12-Lead   • predniSONE (DELTASONE) 10 MG tablet     Tj Brewer MD   No

## 2024-07-19 NOTE — DISCHARGE NOTE PROVIDER - NSDCMRMEDTOKEN_GEN_ALL_CORE_FT
amLODIPine 5 mg oral tablet: 1 tab(s) orally  atenolol 25 mg oral tablet: 1.5 tab(s) orally once a day  atorvastatin 20 mg oral tablet: 1 tab(s) orally once a day (at bedtime)  biotin 5000 mcg oral capsule: 1 cap(s) orally once a day  gabapentin 300 mg oral capsule: 1 cap(s) orally once a day (at bedtime)  lamoTRIgine 200 mg oral tablet: 1 tab(s) orally 2 times a day  levothyroxine 25 mcg (0.025 mg) oral tablet: 1 tab(s) orally once a day Mon thru Fri  levothyroxine 50 mcg (0.05 mg) oral tablet: 1 tab(s) orally once a day Saturday and Sunday  melatonin 10 mg oral tablet: 1 tab(s) orally once a day (at bedtime)  mirtazapine 45 mg oral tablet: 1 tab(s) orally once a day (at bedtime)  Multiple Vitamins oral tablet: 1 tab(s) orally once a day   amLODIPine 5 mg oral tablet: 1 tab(s) orally  atenolol 25 mg oral tablet: 1.5 tab(s) orally once a day  atorvastatin 20 mg oral tablet: 1 tab(s) orally once a day (at bedtime)  biotin 5000 mcg oral capsule: 1 cap(s) orally once a day  gabapentin 300 mg oral capsule: 1 cap(s) orally once a day (at bedtime)  lamoTRIgine 200 mg oral tablet: 1 tab(s) orally 2 times a day  levothyroxine 25 mcg (0.025 mg) oral tablet: 1 tab(s) orally once a day Mon thru Fri  levothyroxine 50 mcg (0.05 mg) oral tablet: 1 tab(s) orally once a day Saturday and Sunday  meclizine 25 mg oral tablet: 1 tab(s) orally 3 times a day as needed for  dizziness  melatonin 10 mg oral tablet: 1 tab(s) orally once a day (at bedtime)  mirtazapine 45 mg oral tablet: 1 tab(s) orally once a day (at bedtime)  Multiple Vitamins oral tablet: 1 tab(s) orally once a day

## 2024-07-19 NOTE — DISCHARGE NOTE PROVIDER - NSDCHHENCOUNTER_GEN_ALL_CORE
Neurosurgery Focus Note  03/28/19     Nehemias Castillo is an 86 year old female who presented with progressive AMS/confusion and generalized weakness/pain. Patient with history of recurrent UTIs, dementia, PMR, HTN, dyslipidemia, and a-fib. Meds included on PTA list are bASA, pletal, and fish oil (unsure if patient took meds regularly). CT head completed showing left cortical IPH with posterior area of parafalcine and tentorium hemorrhage. CTA/CTV negative for vascular abnormalities. She is reportedly oriented to herself - baseline, following commands with right sided weakness/pain and facial droop.    Imaging:  CT head 3/28:  IMPRESSION:  1. 2.2 x 1.2 x 2.7 cm focal area of parenchymal hemorrhage along the high  left parietal lobe posteriorly with some associated surrounding edema and  attenuation of the adjacent sulci.  2. Hemorrhagic material is seen extending along the interhemispheric  fissure and along the left tentorium consistent with associated extra-axial  hemorrhage.  3. Moderate atrophy.    CTA/CTV 3/28:  IMPRESSION:    1.  Normal CT angiogram.  No evidence of extravasation, aneurysm, or  vascular malformation.  2.  Unchanged 2 cm left paracentral lobule parenchymal hematoma with  surrounding edema and small adjacent left parafalcine and left tentorial  subdural hematomas.  Findings may be on the basis of trauma, hypertensive  disease, or coagulopathy; consider MRI with contrast to more fully exclude  an underlying mass lesion.       Plan:   1. MRI brain w/wo to eval for underlying etiologies  2. Keep NPO  3. Agree with Keppra given cortical involvement   4. Appreciate management by critical care: TEG  5. Neurosurgery will see patient in am     Discussed with Dr. Gillespie, Dr. Ellis, Dr. Alfonso, and nursing.    VINITA Erickson  Cranial Neurosurgery     For questions please contact the answering service at [phone] and page the on call NP/PA.     19-Jul-2024

## 2024-07-19 NOTE — PROGRESS NOTE ADULT - SUBJECTIVE AND OBJECTIVE BOX
Date of Service: 07-19-24 @ 10:54    Patient is a 68y old  Female who presents with a chief complaint of dizziness (19 Jul 2024 07:02)      INTERVAL HPI/OVERNIGHT EVENTS: Patient seen and examined. NAD. No complaints.    Vital Signs Last 24 Hrs  T(C): 36.7 (19 Jul 2024 10:26), Max: 36.8 (18 Jul 2024 20:45)  T(F): 98 (19 Jul 2024 10:26), Max: 98.3 (18 Jul 2024 20:45)  HR: 58 (19 Jul 2024 10:26) (58 - 74)  BP: 105/62 (19 Jul 2024 10:26) (105/62 - 150/99)  BP(mean): --  RR: 17 (19 Jul 2024 10:26) (16 - 19)  SpO2: 95% (19 Jul 2024 10:26) (92% - 98%)    Parameters below as of 19 Jul 2024 05:07  Patient On (Oxygen Delivery Method): room air        07-19    140  |  105  |  21  ----------------------------<  155<H>  4.1   |  26  |  0.79    Ca    10.6<H>      19 Jul 2024 06:30  Mg     2.2     07-19    TPro  7.5  /  Alb  3.8  /  TBili  0.4  /  DBili  x   /  AST  22  /  ALT  36  /  AlkPhos  166<H>  07-18                          13.3   10.96 )-----------( 241      ( 19 Jul 2024 06:30 )             39.9     PT/INR - ( 18 Jul 2024 09:20 )   PT: 11.1 sec;   INR: 0.99 ratio         PTT - ( 18 Jul 2024 09:20 )  PTT:29.5 sec  CAPILLARY BLOOD GLUCOSE        Urinalysis Basic - ( 19 Jul 2024 06:30 )    Color: x / Appearance: x / SG: x / pH: x  Gluc: 155 mg/dL / Ketone: x  / Bili: x / Urobili: x   Blood: x / Protein: x / Nitrite: x   Leuk Esterase: x / RBC: x / WBC x   Sq Epi: x / Non Sq Epi: x / Bacteria: x    < from: MR Head No Cont (07.18.24 @ 14:02) >    ACC: [id number] EXAM:  MR BRAIN   ORDERED BY: HARRIET WORRELL     PROCEDURE DATE:  07/18/2024          INTERPRETATION:  CLINICAL INDICATIONS: r/o CVA    COMPARISON: MRI brain 6/2/2021. Head CT dated 7/18/2024    TECHNIQUE: MRI brain: Multiplanar, multisequence MR imaging of the brain   are obtained without the administration of intravenous Gadavist contrast.    FINDINGS:  There is no abnormal restricted diffusion to suggest acute infarction.   Scattered periventricular and subcortical white matterT2 /FLAIR   hyperintensities are seen without mass effect, nonspecific, likely   representing mild chronic microvascular changes. Normal T2 flow-voids are   seen within  the intracranial vasculature. The lateral ventricles and   cortical sulci are age-appropriate in size and configuration. There is no   mass, mass effect, or extra-axial fluid collection. There is no   susceptibility artifact to suggest hemorrhage. Midline structures are   normal.  The visualized paranasal sinuses, mastoid air cells and orbits   are unremarkable.      IMPRESSION: No acute infarction.    --- End of Report ---            KOKI FERNANDEZ MD; Attending Radiologist  This document has been electronically signed. Jul 18 2024  2:06PM    < end of copied text >  < from: TTE W or WO Ultrasound Enhancing Agent (07.18.24 @ 11:59) >    TRANSTHORACIC ECHOCARDIOGRAM REPORT  ________________________________________________________________________________                                      _______       Pt. Name:       SUZANNE MÁRQUEZ Study Date:    7/18/2024  MRN:            [id number]       YOB: 1955  Accession #:    [id number]         Age:           68 years  Account#:       [id number]      Gender:        F  Heart Rate:                       Height:        62.00 in (157.48 cm)  Rhythm:      Weight:        198.00 lb (89.81 kg)  Blood Pressure: 161/73 mmHg       BSA/BMI:       1.90 m² / 36.21 kg/m²  ________________________________________________________________________________________  Referring Physician:    [id number] Leann James  Interpreting Physician: Venugopal Palla MD  Primary Sonographer:    Priti Leon    CPT:               3D RECONST W/O WKSTATION - 28436.m;ECHO TTE WO CON COMP W                     DOPP - 37244.m;MYOCARDIAL STRAIN IMAGING - 94225.m  Indication(s):     Other cerebrovascular disease - I67.89  Procedure:         Transthoracic echocardiogram with 2-D, M-mode and complete                     spectral and color flow Doppler. Color Doppler performed.                     Spectral Doppler performed. Strain imaging performed for                     evaluation of regional and global myocardial shape and                     dimensions. Real time and full volume 3-dimensional imaging                     performed at the echo machine.  OrderingLocation: [facility]  Admission Status:  Inpatient  Study Information: Image quality for this study is good.    _______________________________________________________________________________________     CONCLUSIONS:      1. Left ventricular cavity is normal in size. Left ventricular wall thickness is normal. Left ventricular systolic function is normal with an ejection fraction visually estimated at 60 to 65 %. There are no regional wall motion abnormalities seen.   2. The left ventricular diastolic function is indeterminate.   3. Normal right ventricular cavity size, with normal wall thickness, and normal right ventricular systolic function.   4. Mild mitral regurgitation.   5. Mild aortic regurgitation.   6. Mild tricuspid regurgitation.   7. Estimated pulmonary artery systolic pressure is 30 mmHg, consistent with normal pulmonary artery pressure.    ________________________________________________________________________________________  FINDINGS:     Left Ventricle:  The left ventricular cavity is normal in size. Left ventricular wall thickness is normal. Left ventricular systolic function is normal with an ejection fraction visually estimated at 60 to 65%. There are no regional wall motion abnormalities seen. The left ventricular diastolic function is indeterminate.     Right Ventricle:  The right ventricular cavity is normal in size, with normal wall thickness and right ventricular systolic function is normal. Tricuspid annular plane systolic excursion (TAPSE) is 2.2 cm (normal >=1.7 cm).     Left Atrium:  The left atrium is normal in size.     Right Atrium:  The right atrium is normal in size.     Interatrial Septum:  The interatrial septum appears intact.     Aortic Valve:  The aortic valve is tricuspid with normal leaflet excursion. There is minimal thickening of the aortic valve leaflets. There is no aortic valve stenosis. There is mild aortic regurgitation. AI VMax is 3.07 m/s. AI pressure half time is 496 msec. AI slope is 1.81 m/s².     Mitral Valve:  Structurally normal mitral valve with normal leaflet excursion. There is no mitral valve stenosis. There is mild mitral regurgitation.     Tricuspid Valve:  Structurally normal tricuspid valve with normal leaflet excursion. There is mild tricuspid regurgitation. Estimated pulmonary artery systolic pressure is 30 mmHg, consistent with normal pulmonary artery pressure.     Pulmonic Valve:  The pulmonic valve was not well visualized. Structurally normal pulmonic valve with normal leaflet excursion. There is no evidence of pulmonic regurgitation.     Aorta:  The aortic root appears normal in size.     Pericardium:  No pericardial effusion seen.     Systemic Veins:  The inferior vena cava is normal in size measuring 1.50 cm in diameter, (normal <2.1cm) with normal inspiratory collapse (normal >50%) consistent with normal right atrial pressure (~3, range 0-5mmHg).  ____________________________________________________________________  QUANTITATIVE DATA:  Left Ventricle Measurements: (Indexed to BSA)     IVSd (2D):   0.9 cm  LVPWd (2D):  0.9 cm  LVIDd (2D):  4.8 cm  LVIDs (2D):  2.6 cm  LV Mass:     148 g  77.6 g/m²  Visualized LV EF%: 60 to 65%     MV E Vmax:    0.90 m/s  MV A Vmax:    1.15 m/s  MV E/A:       0.78  e' lateral:   7.58 cm/s  e' medial:    7.89 cm/s  E/e' lateral: 11.89  E/e' medial:  11.42  E/e' Average: 11.65  MV DT:        239 msec    Aorta Measurements: (Normal range) (Indexed to BSA)     Ao Root d    2.70 cm (2.7 - 3.3 cm) 1.42 cm/m²  Ao Asc prox: 3.10 cm                1.63 cm/m²       Left Atrium Measurements: (Indexed to BSA)  LA Diam 2D:        3.50 cm  LA Vol s, MOD A4C: 45.00 ml.  LA Vol s, MOD A2C: 23.90 ml.         Right Ventricle Measurements: Right Atrial Measurements:     TAPSE:            2.2 cm      RA Area A4C: 3.40  RV S' Vmax:       14.50 cm/s  RV Base (RVID1):  2.8 cm  RV Mid (RVID2):   2.4 cm  RV Major (RVID3): 6.9 cm       LVOT / RVOT/ Qp/Qs Data: (Indexed to BSA)  LVOT Vmax:      1.04 m/s  LVOT Vmn:       0.697 m/s  LVOT VTI:       25.90 cm  LVOT peak grad: 4 mmHg  LVOT mean grad: 2.0 mmHg    Aortic Valve Measurements:  AV Vmax:                1.3 m/s  AV Peak Gradient:       7.1 mmHg  AV Mean Gradient:       4.0 mmHg  AV VTI:                 32.3 cm  AV VTI Ratio:           0.80  AoV Dimensionless Index 0.80  AR Vmax                 3.07 m/s  AR PHT                  496 msec  AR [place]                1.81 m/s²    Mitral Valve Measurements:     MV E Vmax: 0.9 m/s         MR Vmax:          4.02 m/s  MV A Vmax: 1.2 m/s         MR Peak Gradient: 64.6 mmHg  MV E/A:    0.8       Tricuspid Valve Measurements:     TR Vmax:          2.6 m/s  TR Peak Gradient: 26.6 mmHg  RA Pressure:      3 mmHg  PASP:             30 mmHg    ________________________________________________________________________________________  Electronically signed on 7/18/2024 at 5:10:12 PM by Venugopal Palla MD         *** Final ***    < end of copied text >            acetaminophen     Tablet .. 650 milliGRAM(s) Oral every 6 hours PRN  aluminum hydroxide/magnesium hydroxide/simethicone Suspension 30 milliLiter(s) Oral every 4 hours PRN  amLODIPine   Tablet 5 milliGRAM(s) Oral daily  aspirin enteric coated 81 milliGRAM(s) Oral daily  atenolol  Tablet 37.5 milliGRAM(s) Oral daily  atorvastatin 20 milliGRAM(s) Oral at bedtime  enoxaparin Injectable 40 milliGRAM(s) SubCutaneous every 24 hours  gabapentin 300 milliGRAM(s) Oral at bedtime  lamoTRIgine 200 milliGRAM(s) Oral two times a day  levothyroxine 50 MICROGram(s) Oral <User Schedule>  levothyroxine 25 MICROGram(s) Oral <User Schedule>  melatonin 5 milliGRAM(s) Oral at bedtime  mirtazapine 45 milliGRAM(s) Oral at bedtime  multivitamin 1 Tablet(s) Oral daily  ondansetron Injectable 4 milliGRAM(s) IV Push every 6 hours PRN              REVIEW OF SYSTEMS:  CONSTITUTIONAL: No fever, no weight loss, or no fatigue  NECK: No pain, no stiffness  RESPIRATORY: No cough, no wheezing, no chills, no hemoptysis, No shortness of breath  CARDIOVASCULAR: No chest pain, no palpitations, no dizziness, no leg swelling  GASTROINTESTINAL: No abdominal pain. No nausea, no vomiting, no hematemesis; No diarrhea, no constipation. No melena, no hematochezia.  GENITOURINARY: No dysuria, no frequency, no hematuria, no incontinence  NEUROLOGICAL: No headaches, no loss of strength, no numbness, no tremors  SKIN: No itching, no burning  MUSCULOSKELETAL: No joint pain, no swelling; No muscle, no back, no extremity pain  PSYCHIATRIC: No depression, no mood swings,   HEME/LYMPH: No easy bruising, no bleeding gums  ALLERY AND IMMUNOLOGIC: No hives       Consultant(s) Notes Reviewed:  [X] YES  [ ] NO    PHYSICAL EXAM:  GENERAL: NAD  HEAD:  Atraumatic, Normocephalic  EYES: EOMI, PERRLA, conjunctiva and sclera clear  ENMT: No tonsillar erythema, exudates, or enlargement; Moist mucous membranes  NECK: Supple, No JVD  NERVOUS SYSTEM:  Awake & alert  CHEST/LUNG: Clear to auscultation bilaterally; No rales, rhonchi, wheezing,  HEART: Regular rate and rhythm  ABDOMEN: Soft, Nontender, Nondistended; Bowel sounds present  EXTREMITIES:  No clubbing, cyanosis, or edema  LYMPH: No lymphadenopathy noted  SKIN: No rashes      Advanced care planning discussed with patient/family [X] YES   [ ] NO    Advanced care planning discussed with patient/family. Patient's health status was discussed. All appropriate changes have been made regarding patient's end-of-life care. Advanced care planning forms reviewed/discussed/completed.  20 minutes spent.

## 2024-07-19 NOTE — DISCHARGE NOTE PROVIDER - NSDCCPCAREPLAN_GEN_ALL_CORE_FT
PRINCIPAL DISCHARGE DIAGNOSIS  Diagnosis: Dizziness  Assessment and Plan of Treatment: Continue Antivert   Follow-up with your primary care doctor within 1 week.       PRINCIPAL DISCHARGE DIAGNOSIS  Diagnosis: Dizziness  Assessment and Plan of Treatment: Continue Antivert as needed  Follow-up with your primary care doctor within 1 week.

## 2024-07-19 NOTE — CASE MANAGEMENT PROGRESS NOTE - NSCMPROGRESSNOTE_GEN_ALL_CORE
RN/CM noted pt's case discussed during Interdisciplinary rounds, pt is eager for DC home, awaiting PT re-eval. Pt initially declined need for services- stated she was independent prior to admission. CM remains available and continues to follow case.  RN/CM noted pt's case discussed during Interdisciplinary rounds, pt is eager for DC home, awaiting PT re-eval. Pt initially declined need for services- stated she was independent prior to admission. CM met with pt today noted her to be very alert and self-directing and is aware that she would need PT clearance prior to DC, pt declining need for rolling walker. Pt confirmed with this CM that her community pharmacy is [facility] @ [address]. CM remains available and continues to follow case.  RN/CM noted pt's case discussed during Interdisciplinary rounds, pt is eager for DC home, awaiting PT re-eval. Pt initially declined need for services- stated she was independent prior to admission. CM met with pt today noted her to be very alert and self-directing and is aware that she would need PT clearance prior to DC, pt declining need for rolling walker. Pt confirmed with this CM that her community pharmacy is [facility] [address]. Pt is aware of plan for DC later today, agreeable, IMM reviewed. Pt declining any skilled needs or need for any assistive device. Pt stated she will arrange for a family member to transport her home. Staff on unit apprised re: all above.

## 2024-07-19 NOTE — PROGRESS NOTE ADULT - SUBJECTIVE AND OBJECTIVE BOX
Patient is a 68y old  Female who presents with a chief complaint of dizziness (18 Jul 2024 11:28)    HPI: This is a 68-year-old female who presents to the emergency room with a chief complaint of feeling unsteady.  Past medical history of HTN anxiety bipolar disorder pre-diabetes hypercholesterol irritable bowel syndrome depression mitral valve prolapse obesity obstructive sleep apnea history of sustained supraventricular tachycardia.  Patient also endorses a prior history of vertigo.  Reports that she went to bed at around 11 PM in her normal state of health and she awoke at around 3 AM feeling off balance dizzy she went back to bed woke up at around 7 had slight improvement in symptoms but had not returned back to her baseline and then around 830 while getting ready to come into volunteer her symptoms acutely worsened and she felt like the wall was moving when she looked at it.  Denies any headache vomiting chest pain shortness of breath or abdominal pain.  Denies any extremity numbness or weakness.  Is not currently nauseous but has had nausea recently as she is transitioning off of Ozempic.  States that this does not feel like her prior vertiginous episode.  Has not taken anything for the symptoms. (18 Jul 2024 11:28)    NIHSS  -0  CT head  -No acute pathology  CTAs  -Neg  Not a TNK candidate  -NIHSS - 0, also woke up with symptoms, last wel known time > 4.5 hrs      Interval history  -    No vertigo now  Walked well with PT    MEDICATIONS  (STANDING):    amLODIPine   Tablet 5 milliGRAM(s) Oral daily  atenolol  Tablet 37.5 milliGRAM(s) Oral daily  atorvastatin 20 milliGRAM(s) Oral at bedtime  gabapentin 300 milliGRAM(s) Oral at bedtime  lamoTRIgine 200 milliGRAM(s) Oral two times a day  levothyroxine 50 MICROGram(s) Oral <User Schedule>  levothyroxine 25 MICROGram(s) Oral <User Schedule>  melatonin 5 milliGRAM(s) Oral at bedtime  mirtazapine 45 milliGRAM(s) Oral at bedtime  multivitamin 1 Tablet(s) Oral daily    MEDICATIONS  (PRN):    acetaminophen     Tablet .. 650 milliGRAM(s) Oral every 6 hours PRN Temp greater or equal to 38C (100.4F), Mild Pain (1 - 3)  aluminum hydroxide/magnesium hydroxide/simethicone Suspension 30 milliLiter(s) Oral every 4 hours PRN Dyspepsia  ondansetron Injectable 4 milliGRAM(s) IV Push every 6 hours PRN Nausea and/or Vomiting    Allergies    sulfa drugs (Hives; Urticaria; Rash)  Beef (Unknown    REVIEW OF SYSTEMS:    CONSTITUTIONAL: No fever  EYES: No eye pain,   ENMT:  No sinus or throat pain  NECK: No pain or stiffness  RESPIRATORY: No cough, No hemoptysis; No shortness of breath  CARDIOVASCULAR: No acute chest pain, palpitations,  or leg swelling  GASTROINTESTINAL: No abdominal pain. No nausea, vomiting, or hematemesis;  No melena or hematochezia.  GENITOURINARY: No  hematuria, or incontinence  MUSCULOSKELETAL: No joint swelling; No extremity pain  SKIN: No itching, rashes, or lesions   LYMPH NODES: No enlarged glands  NEUROLOGICAL: No headaches, memory loss,   PSYCHIATRIC: h/O depression, anxiety, Bipolar  ENDOCRINE: No heat or cold intolerance;   HEME/LYMPH: No easy bruising, or bleeding gums  Allergy/Immunology. Reviewed    PHYSICAL EXAM:    Vital Signs Last 24 Hrs    T(C): 36.8 (19 Jul 2024 13:11), Max: 36.8 (18 Jul 2024 20:45)  T(F): 98.2 (19 Jul 2024 13:11), Max: 98.3 (18 Jul 2024 20:45)  HR: 76 (19 Jul 2024 13:11) (58 - 76)  BP: 112/62 (19 Jul 2024 13:11) (105/62 - 127/67)  BP(mean): --  RR: 18 (19 Jul 2024 13:11) (16 - 18)  SpO2: 95% (19 Jul 2024 13:11) (92% - 95%)    Parameters below as of 19 Jul 2024 05:07  Patient On (Oxygen Delivery Method): room air    GENERAL: NAD, well-groomed, well-developed  HEAD:  Atraumatic, Normocephalic  EYES: EOMI, PERRLA, conjunctiva and sclera clear  NECK: Supple, No JVD    On Neurological Examination:    Mental Status - Pt is alert, awake, oriented X3. Higher functions are intact. Follows commands well and able to answer questions appropriately.    Speech -  Normal. Pt has no aphasia.    Cranial Nerves - Pupils 3 mm equal and reactive to light, extraocular eye movements intact. Pt has no visual field deficit.  No facial asymmetry. Tongue - is in midline.    Motor Exam - 4 plus/5 all over, No drift. No shaking or tremors.    Sensory Exam - Pt withdraws all extremities equally on stimulation.     Gait - Walks well     Deep tendon Reflexes - 2 plus all over.    Coordination - Fine finger movements are normal on both sides. Finger to nose is also normal on both sides.       Neck Supple -  Yes.    LABS:    CBC Full  -  ( 19 Jul 2024 06:30 )  WBC Count : 10.96 K/uL  RBC Count : 4.43 M/uL  Hemoglobin : 13.3 g/dL  Hematocrit : 39.9 %  Platelet Count - Automated : 241 K/uL  Mean Cell Volume : 90.1 fl  Mean Cell Hemoglobin : 30.0 pg  Mean Cell Hemoglobin Concentration : 33.3 gm/dL    07-19    140  |  105  |  21  ----------------------------<  155<H>  4.1   |  26  |  0.79    Ca    10.6<H>      19 Jul 2024 06:30  Mg     2.2     07-19    TPro  7.5  /  Alb  3.8  /  TBili  0.4  /  DBili  x   /  AST  22  /  ALT  36  /  AlkPhos  166<H>  07-18    RADIOLOGY & ADDITIONAL STUDIES:    rad< from: CT Brain Stroke Protocol (07.18.24 @ 09:22) >  CTA head and Neck with contrast     HEAD CT: No acute intracranial hemorrhage or acute territorial infarction.    CTA COW:  Intra cranial circulation without flow limiting stenosis or large vessel occlusion.    CTA NECK: Patent, ECAs, ICAs, no  hemodynamically significant stenosis at ICA origins by NASCET criteria.  Bilateral vertebral arteries are patent without flow limiting stenosis.    < end of copied text >

## 2024-07-19 NOTE — DISCHARGE NOTE PROVIDER - CARE PROVIDER_API CALL
Noemy Samuel  Internal Medicine  [address] [address] [facility], [address]  [place] [address]  Phone: [phone]  Fax: [phone]  Established Patient  Follow Up Time: 1 week

## 2024-07-22 LAB — LAMOTRIGINE SERPL-MCNC: 14.7 UG/ML — SIGNIFICANT CHANGE UP (ref 2–20)

## 2024-10-06 ENCOUNTER — EMERGENCY (EMERGENCY)
Facility: HOSPITAL | Age: 69
LOS: 1 days | Discharge: HOME | End: 2024-10-06
Attending: EMERGENCY MEDICINE
Payer: MEDICARE

## 2024-10-06 VITALS
RESPIRATION RATE: 18 BRPM | TEMPERATURE: 98 F | HEART RATE: 56 BPM | DIASTOLIC BLOOD PRESSURE: 81 MMHG | SYSTOLIC BLOOD PRESSURE: 149 MMHG | OXYGEN SATURATION: 96 %

## 2024-10-06 VITALS
TEMPERATURE: 98 F | SYSTOLIC BLOOD PRESSURE: 146 MMHG | OXYGEN SATURATION: 99 % | WEIGHT: 199.96 LBS | HEART RATE: 55 BPM | RESPIRATION RATE: 15 BRPM | HEIGHT: 62 IN | DIASTOLIC BLOOD PRESSURE: 62 MMHG

## 2024-10-06 DIAGNOSIS — Z98.51 TUBAL LIGATION STATUS: Chronic | ICD-10-CM

## 2024-10-06 LAB
ALBUMIN SERPL ELPH-MCNC: 3.7 G/DL — SIGNIFICANT CHANGE UP (ref 3.3–5)
ALP SERPL-CCNC: 156 U/L — HIGH (ref 30–120)
ALT FLD-CCNC: 34 U/L — SIGNIFICANT CHANGE UP (ref 10–60)
ANION GAP SERPL CALC-SCNC: 7 MMOL/L — SIGNIFICANT CHANGE UP (ref 5–17)
APPEARANCE UR: CLEAR — SIGNIFICANT CHANGE UP
AST SERPL-CCNC: 18 U/L — SIGNIFICANT CHANGE UP (ref 10–40)
BASOPHILS # BLD AUTO: 0.03 K/UL — SIGNIFICANT CHANGE UP (ref 0–0.2)
BASOPHILS NFR BLD AUTO: 0.3 % — SIGNIFICANT CHANGE UP (ref 0–2)
BILIRUB SERPL-MCNC: 0.7 MG/DL — SIGNIFICANT CHANGE UP (ref 0.2–1.2)
BILIRUB UR-MCNC: NEGATIVE — SIGNIFICANT CHANGE UP
BUN SERPL-MCNC: 17 MG/DL — SIGNIFICANT CHANGE UP (ref 7–23)
CALCIUM SERPL-MCNC: 9.2 MG/DL — SIGNIFICANT CHANGE UP (ref 8.4–10.5)
CHLORIDE SERPL-SCNC: 101 MMOL/L — SIGNIFICANT CHANGE UP (ref 96–108)
CO2 SERPL-SCNC: 29 MMOL/L — SIGNIFICANT CHANGE UP (ref 22–31)
COLOR SPEC: YELLOW — SIGNIFICANT CHANGE UP
CREAT SERPL-MCNC: 1.13 MG/DL — SIGNIFICANT CHANGE UP (ref 0.5–1.3)
DIFF PNL FLD: NEGATIVE — SIGNIFICANT CHANGE UP
EGFR: 53 ML/MIN/1.73M2 — LOW
EOSINOPHIL # BLD AUTO: 0.06 K/UL — SIGNIFICANT CHANGE UP (ref 0–0.5)
EOSINOPHIL NFR BLD AUTO: 0.6 % — SIGNIFICANT CHANGE UP (ref 0–6)
GLUCOSE SERPL-MCNC: 108 MG/DL — HIGH (ref 70–99)
GLUCOSE UR QL: NEGATIVE MG/DL — SIGNIFICANT CHANGE UP
HCT VFR BLD CALC: 39 % — SIGNIFICANT CHANGE UP (ref 34.5–45)
HGB BLD-MCNC: 12.9 G/DL — SIGNIFICANT CHANGE UP (ref 11.5–15.5)
IMM GRANULOCYTES NFR BLD AUTO: 0.5 % — SIGNIFICANT CHANGE UP (ref 0–0.9)
KETONES UR-MCNC: NEGATIVE MG/DL — SIGNIFICANT CHANGE UP
LEUKOCYTE ESTERASE UR-ACNC: NEGATIVE — SIGNIFICANT CHANGE UP
LIDOCAIN IGE QN: 17 U/L — SIGNIFICANT CHANGE UP (ref 16–77)
LYMPHOCYTES # BLD AUTO: 1.63 K/UL — SIGNIFICANT CHANGE UP (ref 1–3.3)
LYMPHOCYTES # BLD AUTO: 15.7 % — SIGNIFICANT CHANGE UP (ref 13–44)
MCHC RBC-ENTMCNC: 29.9 PG — SIGNIFICANT CHANGE UP (ref 27–34)
MCHC RBC-ENTMCNC: 33.1 G/DL — SIGNIFICANT CHANGE UP (ref 32–36)
MCV RBC AUTO: 90.5 FL — SIGNIFICANT CHANGE UP (ref 80–100)
MONOCYTES # BLD AUTO: 0.64 K/UL — SIGNIFICANT CHANGE UP (ref 0–0.9)
MONOCYTES NFR BLD AUTO: 6.2 % — SIGNIFICANT CHANGE UP (ref 2–14)
NEUTROPHILS # BLD AUTO: 7.94 K/UL — HIGH (ref 1.8–7.4)
NEUTROPHILS NFR BLD AUTO: 76.7 % — SIGNIFICANT CHANGE UP (ref 43–77)
NITRITE UR-MCNC: NEGATIVE — SIGNIFICANT CHANGE UP
NRBC # BLD: 0 /100 WBCS — SIGNIFICANT CHANGE UP (ref 0–0)
PH UR: 7 — SIGNIFICANT CHANGE UP (ref 5–8)
PLATELET # BLD AUTO: 230 K/UL — SIGNIFICANT CHANGE UP (ref 150–400)
POTASSIUM SERPL-MCNC: 4 MMOL/L — SIGNIFICANT CHANGE UP (ref 3.5–5.3)
POTASSIUM SERPL-SCNC: 4 MMOL/L — SIGNIFICANT CHANGE UP (ref 3.5–5.3)
PROT SERPL-MCNC: 7.3 G/DL — SIGNIFICANT CHANGE UP (ref 6–8.3)
PROT UR-MCNC: NEGATIVE MG/DL — SIGNIFICANT CHANGE UP
RBC # BLD: 4.31 M/UL — SIGNIFICANT CHANGE UP (ref 3.8–5.2)
RBC # FLD: 11.9 % — SIGNIFICANT CHANGE UP (ref 10.3–14.5)
SODIUM SERPL-SCNC: 137 MMOL/L — SIGNIFICANT CHANGE UP (ref 135–145)
SP GR SPEC: 1 — SIGNIFICANT CHANGE UP (ref 1–1.03)
UROBILINOGEN FLD QL: 0.2 MG/DL — SIGNIFICANT CHANGE UP (ref 0.2–1)
WBC # BLD: 10.35 K/UL — SIGNIFICANT CHANGE UP (ref 3.8–10.5)
WBC # FLD AUTO: 10.35 K/UL — SIGNIFICANT CHANGE UP (ref 3.8–10.5)

## 2024-10-06 PROCEDURE — 83690 ASSAY OF LIPASE: CPT

## 2024-10-06 PROCEDURE — 85025 COMPLETE CBC W/AUTO DIFF WBC: CPT

## 2024-10-06 PROCEDURE — 81003 URINALYSIS AUTO W/O SCOPE: CPT

## 2024-10-06 PROCEDURE — 74176 CT ABD & PELVIS W/O CONTRAST: CPT | Mod: 26,MC

## 2024-10-06 PROCEDURE — 99284 EMERGENCY DEPT VISIT MOD MDM: CPT | Mod: 25

## 2024-10-06 PROCEDURE — 80053 COMPREHEN METABOLIC PANEL: CPT

## 2024-10-06 PROCEDURE — 99284 EMERGENCY DEPT VISIT MOD MDM: CPT

## 2024-10-06 PROCEDURE — 36415 COLL VENOUS BLD VENIPUNCTURE: CPT

## 2024-10-06 PROCEDURE — 74176 CT ABD & PELVIS W/O CONTRAST: CPT | Mod: MC

## 2024-10-06 RX ORDER — ONDANSETRON HCL/PF 4 MG/2 ML
4 VIAL (ML) INJECTION ONCE
Refills: 0 | Status: ACTIVE | OUTPATIENT
Start: 2024-10-06

## 2024-10-06 RX ORDER — OXYCODONE AND ACETAMINOPHEN 5; 325 MG/1; MG/1
1 TABLET ORAL
Qty: 10 | Refills: 0
Start: 2024-10-06

## 2024-10-06 RX ORDER — SODIUM CHLORIDE 0.9 % (FLUSH) 0.9 %
1000 SYRINGE (ML) INJECTION ONCE
Refills: 0 | Status: COMPLETED | OUTPATIENT
Start: 2024-10-06 | End: 2024-10-06

## 2024-10-06 RX ADMIN — Medication 1000 MILLILITER(S): at 17:36

## 2024-10-06 RX ADMIN — Medication 500 MILLIGRAM(S): at 20:06

## 2024-10-06 NOTE — ED PROVIDER NOTE - CLINICAL SUMMARY MEDICAL DECISION MAKING FREE TEXT BOX
Patient with history of kidney stones complaining of left back/flank pain worsening since yesterday associated with nausea vomiting and urinary frequency.  Patient took Tylenol approximately 1 hour ago declining pain medication at this time.  No fevers chest pain short of breath cough diarrhea dysuria hematuria.  Pain unaffected by movement or position changes.  PMD June urologist none    Plan Labs CT stone hunt IV fluids Zofran    Differential including but not limited to kidney stone UTI pyelonephritis diverticulitis pancreatitis

## 2024-10-06 NOTE — ED PROVIDER NOTE - PATIENT PORTAL LINK FT
You can access the FollowMyHealth Patient Portal offered by [facility] by registering at the following website: [email or web address]. By joining [email or web address]’s FollowMyHealth portal, you will also be able to view your health information using other applications (apps) compatible with our system.

## 2024-10-06 NOTE — ED PROVIDER NOTE - DIFFERENTIAL DIAGNOSIS
Differential Diagnosis Differential including but not limited to kidney stone UTI pyelonephritis diverticulitis pancreatitis

## 2024-10-06 NOTE — ED ADULT NURSE REASSESSMENT NOTE - NS ED NURSE REASSESS COMMENT FT1
Pt received awake and alert, conversant with clear speech. Urine sample collected and sent to the ED. Pt denies any pain at this time, ambulated to the BR. no urinary symptoms, voiding without any difficulty.

## 2024-10-06 NOTE — ED ADULT NURSE NOTE - OBJECTIVE STATEMENT
Pt came in ambulatory complains of left side flank region started yesterday. Pt also reported vomited once prior to arrival and was nauseous. Pt denies any diarrhea or fever.

## 2024-10-06 NOTE — ED PROVIDER NOTE - CARE PROVIDER_API CALL
Maximus Vizcarra  Urology  [address] [place] [id number]  Phone: [phone]  Fax: [phone]  Follow Up Time: 1-3 Days

## 2024-10-06 NOTE — ED PROVIDER NOTE - OBJECTIVE STATEMENT
Patient with history of kidney stones complaining of left back/flank pain worsening since yesterday associated with nausea vomiting and urinary frequency.  Patient took Tylenol approximately 1 hour ago declining pain medication at this time.  No fevers chest pain short of breath cough diarrhea dysuria hematuria.  Pain unaffected by movement or position changes.  PMD June urologist none

## 2024-10-06 NOTE — ED PROVIDER NOTE - NSFOLLOWUPINSTRUCTIONS_ED_ALL_ED_FT
RENAL COLIC - [facility](R) Instructions(ER/ED)    Renal Colic    WHAT YOU NEED TO KNOW:    Renal colic is severe pain in your lower back or sides. The pain is usually on one side, but may be on both sides of your lower back. Renal colic may start quickly, come and go, and become worse over time. Renal colic is caused by a blockage in your urinary tract. The most common cause of a blockage is a kidney stone. Blood clots, ureter spasms, and dead tissue may also block your urinary tract.  Female Urinary System      DISCHARGE INSTRUCTIONS:    Return to the emergency department if:    You cannot stop vomiting.    You see new or increased bleeding when you urinate.    You are urinating less than usual, or not at all.    Your pain is not getting better even after treatment.  Call your doctor if:    You have fever.    You need to urinate more often than usual, or right away.    You see a stone in your urine strainer after you urinate.    You have questions or concerns about your condition or care.  Medicines:    Medicines may help decrease pain and muscle spasms. You may also need medicine to calm your stomach and stop vomiting.    Take your medicine as directed. Contact your healthcare provider if you think your medicine is not helping or if you have side effects. Tell your provider if you are allergic to any medicine. Keep a list of the medicines, vitamins, and herbs you take. Include the amounts, and when and why you take them. Bring the list or the pill bottles to follow-up visits. Carry your medicine list with you in case of an emergency.  Manage your symptoms:    Drink liquids as directed. This will help decrease pain and flush blockages from your urinary tract. Ask how much liquid to drink each day and which liquids are best for you. You may need to drink about 3 liters (12 glasses) of liquids each day. Half of your total daily liquids should be water. Limit coffee, tea, and soda to 2 cups daily. Your urine should be pale and clear.    Strain your urine every time you urinate. Urinate into a strainer (funnel with a fine mesh on the bottom) or glass jar to collect kidney stones. Give the kidney stones to your healthcare provider at your next visit.  Look for Stones in the Filter      Eat a variety of healthy foods. Healthy foods include fruits, vegetables, whole-grain breads, low-fat dairy products, beans, lean meats, and fish. You may need to increase the amount of citrus fruit you eat, such as oranges. Ask your healthcare provider how much salt, calcium, and protein you should eat.  Healthy Foods      Avoid activity in hot temperatures. Heat may cause you to become dehydrated and urinate less.  Follow up with your doctor as directed: You may need to return for tests to check if your blockage has cleared. Write down your questions so you remember to ask them during your visits.

## 2024-12-05 NOTE — ED ADULT TRIAGE NOTE - TEMPERATURE IN FAHRENHEIT (DEGREES F)
tablet Take 1 tablet by mouth nightly 30 tablet 0   • ondansetron (ZOFRAN-ODT) 8 MG TBDP disintegrating tablet Place 1 tablet under the tongue every 8 hours as needed for Nausea or Vomiting 10 tablet 1   • valACYclovir (VALTREX) 500 MG tablet      • Hyoscyamine Sulfate SL (LEVSIN/SL) 0.125 MG SUBL Place 1 tablet under the tongue 3 times daily as needed (cramping pain) 30 each 0   • Rimegepant Sulfate (NURTEC) 75 MG TBDP Take 1 tablet by mouth daily as needed (migraine) 30 tablet 3   • desogestrel-ethinyl estradiol (APRI) 0.15-30 MG-MCG per tablet Take 1 tablet by mouth daily 3 packet 4   • ondansetron (ZOFRAN) 4 MG tablet Take 1 tablet by mouth every 8 hours as needed for Nausea or Vomiting 15 tablet 0   • azithromycin (ZITHROMAX) 250 MG tablet 500mg on day 1 followed by 250mg on days 2 - 5 6 tablet 0   • levothyroxine (SYNTHROID) 125 MCG tablet Take 1 tablet by mouth daily 30 tablet 1   • levothyroxine (SYNTHROID) 137 MCG tablet Take 1 tablet by mouth daily 90 tablet 1     No current facility-administered medications for this visit.       Vitals:    Ht 1.638 m (5' 4.49\")   Wt 70.3 kg (155 lb)   BMI 26.20 kg/m²     Physical Exam:  Physical Exam  Constitutional:       Appearance: Normal appearance. She is normal weight. She is not ill-appearing or diaphoretic.   HENT:      Head: Normocephalic.      Right Ear: Tympanic membrane normal.      Left Ear: Tympanic membrane normal.      Nose: No congestion or rhinorrhea.   Cardiovascular:      Rate and Rhythm: Normal rate and regular rhythm.      Pulses: Normal pulses.      Heart sounds: Normal heart sounds.   Pulmonary:      Effort: Pulmonary effort is normal.      Breath sounds: Normal breath sounds.   Musculoskeletal:         General: Normal range of motion.      Cervical back: Normal range of motion and neck supple.   Skin:     General: Skin is warm and dry.   Neurological:      Mental Status: She is alert and oriented to person, place, and time.     Assessment/Plan: 
Discharged
98.3

## 2025-03-07 DIAGNOSIS — Z12.31 ENCOUNTER FOR SCREENING MAMMOGRAM FOR MALIGNANT NEOPLASM OF BREAST: ICD-10-CM

## 2025-03-07 DIAGNOSIS — Z13.820 ENCOUNTER FOR SCREENING FOR OSTEOPOROSIS: ICD-10-CM

## 2025-03-07 DIAGNOSIS — Z13.31 ENCOUNTER FOR SCREENING FOR DEPRESSION: ICD-10-CM

## 2025-03-07 DIAGNOSIS — Z12.39 ENCOUNTER FOR OTHER SCREENING FOR MALIGNANT NEOPLASM OF BREAST: ICD-10-CM

## 2025-03-12 ENCOUNTER — APPOINTMENT (OUTPATIENT)
Dept: OBGYN | Facility: CLINIC | Age: 70
End: 2025-03-12
Payer: MEDICARE

## 2025-03-12 ENCOUNTER — NON-APPOINTMENT (OUTPATIENT)
Age: 70
End: 2025-03-12

## 2025-03-12 VITALS
SYSTOLIC BLOOD PRESSURE: 130 MMHG | HEART RATE: 63 BPM | RESPIRATION RATE: 14 BRPM | WEIGHT: 205 LBS | DIASTOLIC BLOOD PRESSURE: 70 MMHG | OXYGEN SATURATION: 95 % | HEIGHT: 61 IN | BODY MASS INDEX: 38.71 KG/M2

## 2025-03-12 DIAGNOSIS — Z01.411 ENCOUNTER FOR GYNECOLOGICAL EXAMINATION (GENERAL) (ROUTINE) WITH ABNORMAL FINDINGS: ICD-10-CM

## 2025-03-12 DIAGNOSIS — Z12.39 ENCOUNTER FOR OTHER SCREENING FOR MALIGNANT NEOPLASM OF BREAST: ICD-10-CM

## 2025-03-12 DIAGNOSIS — N95.2 POSTMENOPAUSAL ATROPHIC VAGINITIS: ICD-10-CM

## 2025-03-12 DIAGNOSIS — Z78.0 ASYMPTOMATIC MENOPAUSAL STATE: ICD-10-CM

## 2025-03-12 DIAGNOSIS — Z86.39 PERSONAL HISTORY OF OTHER ENDOCRINE, NUTRITIONAL AND METABOLIC DISEASE: ICD-10-CM

## 2025-03-12 DIAGNOSIS — Z12.4 ENCOUNTER FOR SCREENING FOR MALIGNANT NEOPLASM OF CERVIX: ICD-10-CM

## 2025-03-12 DIAGNOSIS — Z87.19 PERSONAL HISTORY OF OTHER DISEASES OF THE DIGESTIVE SYSTEM: ICD-10-CM

## 2025-03-12 DIAGNOSIS — Z12.11 ENCOUNTER FOR SCREENING FOR MALIGNANT NEOPLASM OF COLON: ICD-10-CM

## 2025-03-12 PROCEDURE — 82270 OCCULT BLOOD FECES: CPT

## 2025-03-12 PROCEDURE — G0101: CPT

## 2025-03-12 PROCEDURE — G0444 DEPRESSION SCREEN ANNUAL: CPT

## 2025-03-12 RX ORDER — ATORVASTATIN CALCIUM 20 MG/1
20 TABLET, FILM COATED ORAL
Refills: 0 | Status: ACTIVE | COMMUNITY

## 2025-03-12 RX ORDER — LAMOTRIGINE 200 MG/1
200 TABLET, ORALLY DISINTEGRATING ORAL
Refills: 0 | Status: ACTIVE | COMMUNITY

## 2025-03-12 RX ORDER — AMLODIPINE BESYLATE 5 MG/1
5 TABLET ORAL
Refills: 0 | Status: ACTIVE | COMMUNITY

## 2025-03-14 LAB — HPV HIGH+LOW RISK DNA PNL CVX: NOT DETECTED
